# Patient Record
Sex: FEMALE | Race: BLACK OR AFRICAN AMERICAN | Employment: FULL TIME | URBAN - METROPOLITAN AREA
[De-identification: names, ages, dates, MRNs, and addresses within clinical notes are randomized per-mention and may not be internally consistent; named-entity substitution may affect disease eponyms.]

---

## 2017-08-17 PROBLEM — G89.29 CHRONIC LEFT SHOULDER PAIN: Status: ACTIVE | Noted: 2017-08-17

## 2017-08-17 PROBLEM — M25.512 CHRONIC LEFT SHOULDER PAIN: Status: ACTIVE | Noted: 2017-08-17

## 2017-10-19 PROBLEM — M24.661 ARTHROFIBROSIS OF KNEE JOINT, RIGHT: Status: ACTIVE | Noted: 2017-10-19

## 2017-10-19 PROBLEM — Z01.818 PRE-OP EXAM: Status: ACTIVE | Noted: 2017-10-19

## 2017-10-19 PROBLEM — M24.661 FIBROSIS OF RIGHT KNEE JOINT: Status: RESOLVED | Noted: 2017-10-19 | Resolved: 2017-10-19

## 2017-10-19 PROBLEM — M24.661 FIBROSIS OF RIGHT KNEE JOINT: Status: ACTIVE | Noted: 2017-10-19

## 2017-10-19 PROBLEM — Z96.651 STATUS POST TOTAL KNEE REPLACEMENT USING CEMENT, RIGHT: Status: ACTIVE | Noted: 2017-10-19

## 2017-11-01 PROCEDURE — 81001 URINALYSIS AUTO W/SCOPE: CPT | Performed by: NURSE PRACTITIONER

## 2017-11-01 PROCEDURE — 82652 VIT D 1 25-DIHYDROXY: CPT | Performed by: FAMILY MEDICINE

## 2017-12-01 PROBLEM — Z47.1 AFTERCARE FOLLOWING JOINT REPLACEMENT SURGERY: Status: ACTIVE | Noted: 2017-12-01

## 2017-12-11 PROBLEM — Z96.652 STATUS POST TOTAL KNEE REPLACEMENT USING CEMENT, LEFT: Status: ACTIVE | Noted: 2017-12-11

## 2017-12-18 PROBLEM — R26.9 ABNORMALITY OF GAIT: Status: ACTIVE | Noted: 2017-12-18

## 2017-12-18 PROBLEM — Z98.890 S/P SURGICAL MANIPULATION OF KNEE JOINT: Status: ACTIVE | Noted: 2017-12-18

## 2017-12-19 PROBLEM — Z47.1 AFTERCARE FOLLOWING JOINT REPLACEMENT SURGERY: Status: RESOLVED | Noted: 2017-12-01 | Resolved: 2017-12-19

## 2018-04-23 PROBLEM — M24.662 ARTHROFIBROSIS OF KNEE JOINT, LEFT: Status: ACTIVE | Noted: 2018-04-23

## 2018-05-11 PROBLEM — M25.662 DECREASED RANGE OF MOTION OF LEFT KNEE: Status: ACTIVE | Noted: 2018-05-11

## 2018-08-09 PROBLEM — H25.13 AGE-RELATED NUCLEAR CATARACT, BILATERAL: Status: ACTIVE | Noted: 2018-08-09

## 2018-08-09 PROBLEM — H52.13 MYOPIA OF BOTH EYES WITH ASTIGMATISM AND PRESBYOPIA: Status: ACTIVE | Noted: 2018-08-09

## 2018-08-09 PROBLEM — H52.203 MYOPIA OF BOTH EYES WITH ASTIGMATISM AND PRESBYOPIA: Status: ACTIVE | Noted: 2018-08-09

## 2018-08-09 PROBLEM — H52.4 MYOPIA OF BOTH EYES WITH ASTIGMATISM AND PRESBYOPIA: Status: ACTIVE | Noted: 2018-08-09

## 2018-10-02 PROCEDURE — 87389 HIV-1 AG W/HIV-1&-2 AB AG IA: CPT | Performed by: FAMILY MEDICINE

## 2018-10-02 PROCEDURE — 86803 HEPATITIS C AB TEST: CPT | Performed by: FAMILY MEDICINE

## 2018-11-13 PROBLEM — Z80.3 FAMILY HISTORY OF BREAST CANCER: Status: ACTIVE | Noted: 2018-11-13

## 2018-12-11 PROCEDURE — 88305 TISSUE EXAM BY PATHOLOGIST: CPT | Performed by: RADIOLOGY

## 2019-04-24 PROCEDURE — 87205 SMEAR GRAM STAIN: CPT | Performed by: ORTHOPAEDIC SURGERY

## 2019-04-24 PROCEDURE — 87075 CULTR BACTERIA EXCEPT BLOOD: CPT | Performed by: ORTHOPAEDIC SURGERY

## 2019-04-24 PROCEDURE — 87070 CULTURE OTHR SPECIMN AEROBIC: CPT | Performed by: ORTHOPAEDIC SURGERY

## 2019-05-06 VITALS — BODY MASS INDEX: 31.89 KG/M2 | WEIGHT: 180 LBS | HEIGHT: 63 IN

## 2019-05-06 RX ORDER — MULTIVIT-MIN/IRON FUM/FOLIC AC 7.5 MG-4
1 TABLET ORAL DAILY
Status: ON HOLD | COMMUNITY
End: 2019-05-22

## 2019-05-06 RX ORDER — IBUPROFEN 600 MG/1
600 TABLET ORAL EVERY 6 HOURS PRN
COMMUNITY
End: 2019-05-07

## 2019-05-14 ENCOUNTER — APPOINTMENT (OUTPATIENT)
Dept: LAB | Facility: HOSPITAL | Age: 64
End: 2019-05-14
Attending: ORTHOPAEDIC SURGERY
Payer: COMMERCIAL

## 2019-05-14 ENCOUNTER — HOSPITAL ENCOUNTER (OUTPATIENT)
Dept: ULTRASOUND IMAGING | Facility: HOSPITAL | Age: 64
Discharge: HOME | End: 2019-05-14
Attending: ORTHOPAEDIC SURGERY
Payer: COMMERCIAL

## 2019-05-14 DIAGNOSIS — Z96.652 STATUS POST TOTAL KNEE REPLACEMENT USING CEMENT, LEFT: ICD-10-CM

## 2019-05-14 PROCEDURE — 87205 SMEAR GRAM STAIN: CPT | Performed by: ORTHOPAEDIC SURGERY

## 2019-05-14 PROCEDURE — 89060 EXAM SYNOVIAL FLUID CRYSTALS: CPT | Performed by: ORTHOPAEDIC SURGERY

## 2019-05-14 PROCEDURE — 87070 CULTURE OTHR SPECIMN AEROBIC: CPT | Performed by: ORTHOPAEDIC SURGERY

## 2019-05-14 PROCEDURE — 87077 CULTURE AEROBIC IDENTIFY: CPT | Performed by: ORTHOPAEDIC SURGERY

## 2019-05-14 PROCEDURE — 87186 SC STD MICRODIL/AGAR DIL: CPT | Performed by: ORTHOPAEDIC SURGERY

## 2019-05-14 PROCEDURE — 89050 BODY FLUID CELL COUNT: CPT | Performed by: ORTHOPAEDIC SURGERY

## 2019-05-14 PROCEDURE — 89051 BODY FLUID CELL COUNT: CPT | Performed by: ORTHOPAEDIC SURGERY

## 2019-05-14 PROCEDURE — 20611 DRAIN/INJ JOINT/BURSA W/US: CPT | Performed by: ORTHOPAEDIC SURGERY

## 2019-05-14 NOTE — PROCEDURES
PROCEDURE:  US ASPIRATION/INJECTION MAJOR JOINT INCLD IMAG LEFT (CPT=20611)     COMPARISON:  None. INDICATIONS:  J52.758 Presence of left artificial knee joint     DESCRIPTION:  Witnessed verbal and written informed consent was obtained.  Time out was p

## 2019-05-16 PROCEDURE — 87081 CULTURE SCREEN ONLY: CPT | Performed by: FAMILY MEDICINE

## 2019-05-21 ENCOUNTER — ANESTHESIA EVENT (OUTPATIENT)
Dept: SURGERY | Facility: HOSPITAL | Age: 64
DRG: 467 | End: 2019-05-21
Payer: COMMERCIAL

## 2019-05-21 NOTE — ANESTHESIA PREPROCEDURE EVALUATION
PRE-OP EVALUATION    Patient Name: Herson Aceves    Pre-op Diagnosis: Infection of prosthetic knee joint, initial encounter ([facility] [place] [facility] [T84.59XA, Z96.659]    Procedure(s):  REMOVAL OF INFECTED LEFT TOTAL KNEE REPLACEMENT    Surgeon(s) and Role:     Migdalia Ware, Primary osteoarthritis of left knee     Chronic left shoulder pain     Pre-op exam     Status post total knee replacement using cement, right     Arthrofibrosis of knee joint, right     Status post total knee replacement using cement, left     S/P surgic normal.  Breath sounds clear to auscultation bilaterally. Other findings            ASA: 2   Plan: spinal  NPO status verified and patient meets guidelines. Post-procedure pain management plan discussed with surgeon and patient.   Surgeon r

## 2019-05-22 ENCOUNTER — APPOINTMENT (OUTPATIENT)
Dept: GENERAL RADIOLOGY | Facility: HOSPITAL | Age: 64
DRG: 467 | End: 2019-05-22
Attending: PHYSICIAN ASSISTANT
Payer: COMMERCIAL

## 2019-05-22 ENCOUNTER — HOSPITAL ENCOUNTER (INPATIENT)
Facility: HOSPITAL | Age: 64
LOS: 7 days | Discharge: HOME HEALTH | DRG: 467 | End: 2019-05-29
Attending: ORTHOPAEDIC SURGERY | Admitting: ORTHOPAEDIC SURGERY
Payer: COMMERCIAL

## 2019-05-22 ENCOUNTER — ANESTHESIA (OUTPATIENT)
Dept: SURGERY | Facility: HOSPITAL | Age: 64
DRG: 467 | End: 2019-05-22
Payer: COMMERCIAL

## 2019-05-22 DIAGNOSIS — T84.59XA INFECTION OF PROSTHETIC KNEE JOINT, INITIAL ENCOUNTER (HCC): ICD-10-CM

## 2019-05-22 DIAGNOSIS — Z96.659 INFECTION OF PROSTHETIC KNEE JOINT, INITIAL ENCOUNTER (HCC): ICD-10-CM

## 2019-05-22 PROCEDURE — 87075 CULTR BACTERIA EXCEPT BLOOD: CPT | Performed by: ORTHOPAEDIC SURGERY

## 2019-05-22 PROCEDURE — 87070 CULTURE OTHR SPECIMN AEROBIC: CPT | Performed by: ORTHOPAEDIC SURGERY

## 2019-05-22 PROCEDURE — 87076 CULTURE ANAEROBE IDENT EACH: CPT | Performed by: ORTHOPAEDIC SURGERY

## 2019-05-22 PROCEDURE — 87205 SMEAR GRAM STAIN: CPT | Performed by: ORTHOPAEDIC SURGERY

## 2019-05-22 PROCEDURE — 0QBH0ZZ EXCISION OF LEFT TIBIA, OPEN APPROACH: ICD-10-PCS | Performed by: ORTHOPAEDIC SURGERY

## 2019-05-22 PROCEDURE — 87116 MYCOBACTERIA CULTURE: CPT | Performed by: ORTHOPAEDIC SURGERY

## 2019-05-22 PROCEDURE — 87077 CULTURE AEROBIC IDENTIFY: CPT | Performed by: ORTHOPAEDIC SURGERY

## 2019-05-22 PROCEDURE — 87206 SMEAR FLUORESCENT/ACID STAI: CPT | Performed by: ORTHOPAEDIC SURGERY

## 2019-05-22 PROCEDURE — 87176 TISSUE HOMOGENIZATION CULTR: CPT | Performed by: ORTHOPAEDIC SURGERY

## 2019-05-22 PROCEDURE — 87186 SC STD MICRODIL/AGAR DIL: CPT | Performed by: ORTHOPAEDIC SURGERY

## 2019-05-22 PROCEDURE — 0SPD0JZ REMOVAL OF SYNTHETIC SUBSTITUTE FROM LEFT KNEE JOINT, OPEN APPROACH: ICD-10-PCS | Performed by: ORTHOPAEDIC SURGERY

## 2019-05-22 PROCEDURE — 87102 FUNGUS ISOLATION CULTURE: CPT | Performed by: ORTHOPAEDIC SURGERY

## 2019-05-22 PROCEDURE — 87147 CULTURE TYPE IMMUNOLOGIC: CPT | Performed by: ORTHOPAEDIC SURGERY

## 2019-05-22 PROCEDURE — 73560 X-RAY EXAM OF KNEE 1 OR 2: CPT | Performed by: PHYSICIAN ASSISTANT

## 2019-05-22 PROCEDURE — 86901 BLOOD TYPING SEROLOGIC RH(D): CPT | Performed by: ORTHOPAEDIC SURGERY

## 2019-05-22 PROCEDURE — 86850 RBC ANTIBODY SCREEN: CPT | Performed by: ORTHOPAEDIC SURGERY

## 2019-05-22 PROCEDURE — 0SRD0EZ REPLACEMENT OF LEFT KNEE JOINT WITH ARTICULATING SPACER, OPEN APPROACH: ICD-10-PCS | Performed by: ORTHOPAEDIC SURGERY

## 2019-05-22 PROCEDURE — 86920 COMPATIBILITY TEST SPIN: CPT

## 2019-05-22 PROCEDURE — 0QBC0ZZ EXCISION OF LEFT LOWER FEMUR, OPEN APPROACH: ICD-10-PCS | Performed by: ORTHOPAEDIC SURGERY

## 2019-05-22 PROCEDURE — 76942 ECHO GUIDE FOR BIOPSY: CPT | Performed by: ORTHOPAEDIC SURGERY

## 2019-05-22 PROCEDURE — 3E0T3BZ INTRODUCTION OF ANESTHETIC AGENT INTO PERIPHERAL NERVES AND PLEXI, PERCUTANEOUS APPROACH: ICD-10-PCS | Performed by: ANESTHESIOLOGY

## 2019-05-22 PROCEDURE — 86900 BLOOD TYPING SEROLOGIC ABO: CPT | Performed by: ORTHOPAEDIC SURGERY

## 2019-05-22 DEVICE — CEMENT BONE RADIOPAQ HOWMEDICA: Type: IMPLANTABLE DEVICE | Site: KNEE | Status: FUNCTIONAL

## 2019-05-22 RX ORDER — ACETAMINOPHEN 500 MG
1000 TABLET ORAL ONCE
Status: DISCONTINUED | OUTPATIENT
Start: 2019-05-22 | End: 2019-05-22

## 2019-05-22 RX ORDER — DEXAMETHASONE SODIUM PHOSPHATE 4 MG/ML
8 VIAL (ML) INJECTION AS NEEDED
Status: DISCONTINUED | OUTPATIENT
Start: 2019-05-22 | End: 2019-05-22 | Stop reason: HOSPADM

## 2019-05-22 RX ORDER — HYDROMORPHONE HYDROCHLORIDE 1 MG/ML
0.2 INJECTION, SOLUTION INTRAMUSCULAR; INTRAVENOUS; SUBCUTANEOUS EVERY 2 HOUR PRN
Status: DISPENSED | OUTPATIENT
Start: 2019-05-22 | End: 2019-05-24

## 2019-05-22 RX ORDER — TIZANIDINE 2 MG/1
2 TABLET ORAL EVERY 6 HOURS PRN
Qty: 60 TABLET | Refills: 0 | Status: SHIPPED | OUTPATIENT
Start: 2019-05-22 | End: 2019-06-25 | Stop reason: ALTCHOICE

## 2019-05-22 RX ORDER — OXYCODONE HYDROCHLORIDE 5 MG/1
5 TABLET ORAL EVERY 4 HOURS PRN
Status: DISPENSED | OUTPATIENT
Start: 2019-05-22 | End: 2019-05-24

## 2019-05-22 RX ORDER — DIPHENHYDRAMINE HCL 25 MG
25 CAPSULE ORAL EVERY 4 HOURS PRN
Status: DISCONTINUED | OUTPATIENT
Start: 2019-05-22 | End: 2019-05-29

## 2019-05-22 RX ORDER — TRAMADOL HYDROCHLORIDE 50 MG/1
50 TABLET ORAL EVERY 6 HOURS
Status: DISCONTINUED | OUTPATIENT
Start: 2019-05-22 | End: 2019-05-24

## 2019-05-22 RX ORDER — SCOLOPAMINE TRANSDERMAL SYSTEM 1 MG/1
1 PATCH, EXTENDED RELEASE TRANSDERMAL ONCE
Status: COMPLETED | OUTPATIENT
Start: 2019-05-22 | End: 2019-05-25

## 2019-05-22 RX ORDER — BISACODYL 10 MG
10 SUPPOSITORY, RECTAL RECTAL
Status: DISCONTINUED | OUTPATIENT
Start: 2019-05-22 | End: 2019-05-29

## 2019-05-22 RX ORDER — TIZANIDINE 4 MG/1
4 TABLET ORAL 3 TIMES DAILY PRN
Status: DISCONTINUED | OUTPATIENT
Start: 2019-05-22 | End: 2019-05-29

## 2019-05-22 RX ORDER — POLYETHYLENE GLYCOL 3350 17 G/17G
17 POWDER, FOR SOLUTION ORAL DAILY PRN
Status: DISCONTINUED | OUTPATIENT
Start: 2019-05-22 | End: 2019-05-29

## 2019-05-22 RX ORDER — OXYCODONE HYDROCHLORIDE 5 MG/1
5 TABLET ORAL EVERY 4 HOURS PRN
Qty: 40 TABLET | Refills: 0 | Status: SHIPPED | OUTPATIENT
Start: 2019-05-22 | End: 2019-07-22

## 2019-05-22 RX ORDER — SODIUM CHLORIDE 9 MG/ML
INJECTION, SOLUTION INTRAVENOUS CONTINUOUS
Status: DISCONTINUED | OUTPATIENT
Start: 2019-05-22 | End: 2019-05-24

## 2019-05-22 RX ORDER — HYDROCODONE BITARTRATE AND ACETAMINOPHEN 5; 325 MG/1; MG/1
2 TABLET ORAL AS NEEDED
Status: DISCONTINUED | OUTPATIENT
Start: 2019-05-22 | End: 2019-05-22 | Stop reason: HOSPADM

## 2019-05-22 RX ORDER — ONDANSETRON 2 MG/ML
4 INJECTION INTRAMUSCULAR; INTRAVENOUS EVERY 4 HOURS PRN
Status: DISPENSED | OUTPATIENT
Start: 2019-05-22 | End: 2019-05-24

## 2019-05-22 RX ORDER — HYDROMORPHONE HYDROCHLORIDE 1 MG/ML
0.8 INJECTION, SOLUTION INTRAMUSCULAR; INTRAVENOUS; SUBCUTANEOUS EVERY 2 HOUR PRN
Status: ACTIVE | OUTPATIENT
Start: 2019-05-22 | End: 2019-05-24

## 2019-05-22 RX ORDER — SODIUM PHOSPHATE, DIBASIC AND SODIUM PHOSPHATE, MONOBASIC 7; 19 G/133ML; G/133ML
1 ENEMA RECTAL ONCE AS NEEDED
Status: DISCONTINUED | OUTPATIENT
Start: 2019-05-22 | End: 2019-05-29

## 2019-05-22 RX ORDER — OXYCODONE HYDROCHLORIDE 10 MG/1
10 TABLET ORAL EVERY 4 HOURS PRN
Status: DISPENSED | OUTPATIENT
Start: 2019-05-22 | End: 2019-05-24

## 2019-05-22 RX ORDER — NALOXONE HYDROCHLORIDE 0.4 MG/ML
80 INJECTION, SOLUTION INTRAMUSCULAR; INTRAVENOUS; SUBCUTANEOUS AS NEEDED
Status: DISCONTINUED | OUTPATIENT
Start: 2019-05-22 | End: 2019-05-22 | Stop reason: HOSPADM

## 2019-05-22 RX ORDER — SODIUM CHLORIDE, SODIUM LACTATE, POTASSIUM CHLORIDE, CALCIUM CHLORIDE 600; 310; 30; 20 MG/100ML; MG/100ML; MG/100ML; MG/100ML
INJECTION, SOLUTION INTRAVENOUS CONTINUOUS
Status: DISCONTINUED | OUTPATIENT
Start: 2019-05-22 | End: 2019-05-22

## 2019-05-22 RX ORDER — ACETAMINOPHEN 325 MG/1
650 TABLET ORAL ONCE
Status: COMPLETED | OUTPATIENT
Start: 2019-05-22 | End: 2019-05-22

## 2019-05-22 RX ORDER — ONDANSETRON 2 MG/ML
4 INJECTION INTRAMUSCULAR; INTRAVENOUS AS NEEDED
Status: DISCONTINUED | OUTPATIENT
Start: 2019-05-22 | End: 2019-05-22 | Stop reason: HOSPADM

## 2019-05-22 RX ORDER — ACETAMINOPHEN 325 MG/1
650 TABLET ORAL 4 TIMES DAILY
Status: DISPENSED | OUTPATIENT
Start: 2019-05-22 | End: 2019-05-24

## 2019-05-22 RX ORDER — ACETAMINOPHEN 500 MG
1000 TABLET ORAL EVERY 6 HOURS PRN
Qty: 80 TABLET | Refills: 0 | Status: SHIPPED | OUTPATIENT
Start: 2019-05-22 | End: 2019-06-21

## 2019-05-22 RX ORDER — METOCLOPRAMIDE HYDROCHLORIDE 5 MG/ML
10 INJECTION INTRAMUSCULAR; INTRAVENOUS AS NEEDED
Status: DISCONTINUED | OUTPATIENT
Start: 2019-05-22 | End: 2019-05-22 | Stop reason: HOSPADM

## 2019-05-22 RX ORDER — HYDROCODONE BITARTRATE AND ACETAMINOPHEN 5; 325 MG/1; MG/1
1 TABLET ORAL AS NEEDED
Status: DISCONTINUED | OUTPATIENT
Start: 2019-05-22 | End: 2019-05-22 | Stop reason: HOSPADM

## 2019-05-22 RX ORDER — MELATONIN
325
Status: DISCONTINUED | OUTPATIENT
Start: 2019-05-23 | End: 2019-05-29

## 2019-05-22 RX ORDER — KETOROLAC TROMETHAMINE 15 MG/ML
15 INJECTION, SOLUTION INTRAMUSCULAR; INTRAVENOUS EVERY 6 HOURS
Status: COMPLETED | OUTPATIENT
Start: 2019-05-22 | End: 2019-05-23

## 2019-05-22 RX ORDER — OXYCODONE HYDROCHLORIDE 15 MG/1
15 TABLET ORAL EVERY 4 HOURS PRN
Status: ACTIVE | OUTPATIENT
Start: 2019-05-22 | End: 2019-05-24

## 2019-05-22 RX ORDER — DIPHENHYDRAMINE HYDROCHLORIDE 50 MG/ML
12.5 INJECTION INTRAMUSCULAR; INTRAVENOUS EVERY 4 HOURS PRN
Status: DISCONTINUED | OUTPATIENT
Start: 2019-05-22 | End: 2019-05-29

## 2019-05-22 RX ORDER — MEPERIDINE HYDROCHLORIDE 25 MG/ML
12.5 INJECTION INTRAMUSCULAR; INTRAVENOUS; SUBCUTANEOUS AS NEEDED
Status: DISCONTINUED | OUTPATIENT
Start: 2019-05-22 | End: 2019-05-22 | Stop reason: HOSPADM

## 2019-05-22 RX ORDER — METOCLOPRAMIDE HYDROCHLORIDE 5 MG/ML
10 INJECTION INTRAMUSCULAR; INTRAVENOUS EVERY 6 HOURS PRN
Status: DISCONTINUED | OUTPATIENT
Start: 2019-05-22 | End: 2019-05-24

## 2019-05-22 RX ORDER — PROCHLORPERAZINE EDISYLATE 5 MG/ML
10 INJECTION INTRAMUSCULAR; INTRAVENOUS EVERY 6 HOURS PRN
Status: ACTIVE | OUTPATIENT
Start: 2019-05-22 | End: 2019-05-24

## 2019-05-22 RX ORDER — ACETAMINOPHEN 325 MG/1
TABLET ORAL
Status: COMPLETED
Start: 2019-05-22 | End: 2019-05-22

## 2019-05-22 RX ORDER — VANCOMYCIN HYDROCHLORIDE 1 G/20ML
INJECTION, POWDER, LYOPHILIZED, FOR SOLUTION INTRAVENOUS AS NEEDED
Status: DISCONTINUED | OUTPATIENT
Start: 2019-05-22 | End: 2019-05-22 | Stop reason: HOSPADM

## 2019-05-22 RX ORDER — SODIUM CHLORIDE, SODIUM LACTATE, POTASSIUM CHLORIDE, CALCIUM CHLORIDE 600; 310; 30; 20 MG/100ML; MG/100ML; MG/100ML; MG/100ML
INJECTION, SOLUTION INTRAVENOUS CONTINUOUS
Status: DISCONTINUED | OUTPATIENT
Start: 2019-05-22 | End: 2019-05-22 | Stop reason: HOSPADM

## 2019-05-22 RX ORDER — HYDROMORPHONE HYDROCHLORIDE 1 MG/ML
0.4 INJECTION, SOLUTION INTRAMUSCULAR; INTRAVENOUS; SUBCUTANEOUS EVERY 5 MIN PRN
Status: DISCONTINUED | OUTPATIENT
Start: 2019-05-22 | End: 2019-05-22 | Stop reason: HOSPADM

## 2019-05-22 RX ORDER — CEFAZOLIN SODIUM/WATER 2 G/20 ML
2 SYRINGE (ML) INTRAVENOUS ONCE
Status: COMPLETED | OUTPATIENT
Start: 2019-05-22 | End: 2019-05-22

## 2019-05-22 RX ORDER — OXYCODONE HCL 10 MG/1
10 TABLET, FILM COATED, EXTENDED RELEASE ORAL
Status: COMPLETED | OUTPATIENT
Start: 2019-05-22 | End: 2019-05-22

## 2019-05-22 RX ORDER — DIPHENHYDRAMINE HYDROCHLORIDE 50 MG/ML
25 INJECTION INTRAMUSCULAR; INTRAVENOUS ONCE AS NEEDED
Status: ACTIVE | OUTPATIENT
Start: 2019-05-22 | End: 2019-05-22

## 2019-05-22 RX ORDER — SENNOSIDES 8.6 MG
17.2 TABLET ORAL NIGHTLY
Status: DISCONTINUED | OUTPATIENT
Start: 2019-05-22 | End: 2019-05-29

## 2019-05-22 RX ORDER — DOCUSATE SODIUM 100 MG/1
100 CAPSULE, LIQUID FILLED ORAL 2 TIMES DAILY
Qty: 60 CAPSULE | Refills: 0 | Status: SHIPPED | OUTPATIENT
Start: 2019-05-22 | End: 2019-06-25 | Stop reason: ALTCHOICE

## 2019-05-22 RX ORDER — ACETAMINOPHEN 500 MG
1000 TABLET ORAL ONCE
Status: ON HOLD | COMMUNITY
End: 2019-05-22

## 2019-05-22 RX ORDER — MIDAZOLAM HYDROCHLORIDE 1 MG/ML
1 INJECTION INTRAMUSCULAR; INTRAVENOUS EVERY 5 MIN PRN
Status: DISCONTINUED | OUTPATIENT
Start: 2019-05-22 | End: 2019-05-22 | Stop reason: HOSPADM

## 2019-05-22 RX ORDER — HYDROMORPHONE HYDROCHLORIDE 1 MG/ML
0.4 INJECTION, SOLUTION INTRAMUSCULAR; INTRAVENOUS; SUBCUTANEOUS EVERY 2 HOUR PRN
Status: ACTIVE | OUTPATIENT
Start: 2019-05-22 | End: 2019-05-24

## 2019-05-22 RX ORDER — TOBRAMYCIN 1.2 G/30ML
INJECTION, POWDER, LYOPHILIZED, FOR SOLUTION INTRAVENOUS AS NEEDED
Status: DISCONTINUED | OUTPATIENT
Start: 2019-05-22 | End: 2019-05-22 | Stop reason: HOSPADM

## 2019-05-22 RX ORDER — CEFAZOLIN SODIUM/WATER 2 G/20 ML
2 SYRINGE (ML) INTRAVENOUS EVERY 8 HOURS
Status: COMPLETED | OUTPATIENT
Start: 2019-05-22 | End: 2019-05-23

## 2019-05-22 RX ORDER — DOCUSATE SODIUM 100 MG/1
100 CAPSULE, LIQUID FILLED ORAL 2 TIMES DAILY
Status: DISCONTINUED | OUTPATIENT
Start: 2019-05-22 | End: 2019-05-29

## 2019-05-22 NOTE — INTERVAL H&P NOTE
Pre-op Diagnosis: Infection of prosthetic knee joint, initial encounter [facility]) [T84.59XA, Z96.659]    The above referenced H&P was reviewed by Carmen Cortes MD on 5/22/2019, the patient was examined and no significant changes have occurred in the patient's co

## 2019-05-22 NOTE — ANESTHESIA POSTPROCEDURE EVALUATION
300 [facility] Patient Status:  Surgery Admit - Inpt   Age/Gender 66year old female MRN [id number]   [facility] SURGERY Attending Dana Hernandez MD   Hosp Day # 0 PCP Xavi Hunt MD       Anesthesia Post-op Note    Procedu
Matilda Marquez

## 2019-05-22 NOTE — BRIEF OP NOTE
Pre-Operative Diagnosis: Infection of prosthetic knee joint, initial encounter ([facility] [place] [facility] [T84.59XA, Z96.659]     Post-Operative Diagnosis: * No post-op diagnosis entered *      Procedure Performed:   Procedure(s):  REMOVAL OF INFECTED LEFT TOTAL KNEE REPLACEME

## 2019-05-23 ENCOUNTER — APPOINTMENT (OUTPATIENT)
Dept: GENERAL RADIOLOGY | Facility: HOSPITAL | Age: 64
DRG: 467 | End: 2019-05-23
Attending: INTERNAL MEDICINE
Payer: COMMERCIAL

## 2019-05-23 PROCEDURE — 83605 ASSAY OF LACTIC ACID: CPT | Performed by: HOSPITALIST

## 2019-05-23 PROCEDURE — 36573 INSJ PICC RS&I 5 YR+: CPT

## 2019-05-23 PROCEDURE — 71045 X-RAY EXAM CHEST 1 VIEW: CPT | Performed by: INTERNAL MEDICINE

## 2019-05-23 PROCEDURE — 02HV33Z INSERTION OF INFUSION DEVICE INTO SUPERIOR VENA CAVA, PERCUTANEOUS APPROACH: ICD-10-PCS | Performed by: ORTHOPAEDIC SURGERY

## 2019-05-23 PROCEDURE — 97530 THERAPEUTIC ACTIVITIES: CPT

## 2019-05-23 PROCEDURE — B548ZZA ULTRASONOGRAPHY OF SUPERIOR VENA CAVA, GUIDANCE: ICD-10-PCS | Performed by: ORTHOPAEDIC SURGERY

## 2019-05-23 PROCEDURE — 97165 OT EVAL LOW COMPLEX 30 MIN: CPT

## 2019-05-23 PROCEDURE — 82565 ASSAY OF CREATININE: CPT | Performed by: ORTHOPAEDIC SURGERY

## 2019-05-23 PROCEDURE — 97535 SELF CARE MNGMENT TRAINING: CPT

## 2019-05-23 PROCEDURE — 85027 COMPLETE CBC AUTOMATED: CPT | Performed by: ORTHOPAEDIC SURGERY

## 2019-05-23 PROCEDURE — 87040 BLOOD CULTURE FOR BACTERIA: CPT | Performed by: HOSPITALIST

## 2019-05-23 PROCEDURE — 97161 PT EVAL LOW COMPLEX 20 MIN: CPT

## 2019-05-23 PROCEDURE — 3E04329 INTRODUCTION OF OTHER ANTI-INFECTIVE INTO CENTRAL VEIN, PERCUTANEOUS APPROACH: ICD-10-PCS | Performed by: ORTHOPAEDIC SURGERY

## 2019-05-23 PROCEDURE — 85018 HEMOGLOBIN: CPT | Performed by: HOSPITALIST

## 2019-05-23 RX ORDER — SODIUM CHLORIDE 9 MG/ML
INJECTION, SOLUTION INTRAVENOUS ONCE
Status: COMPLETED | OUTPATIENT
Start: 2019-05-23 | End: 2019-05-23

## 2019-05-23 RX ORDER — SODIUM CHLORIDE 0.9 % (FLUSH) 0.9 %
10 SYRINGE (ML) INJECTION AS NEEDED
Status: DISCONTINUED | OUTPATIENT
Start: 2019-05-23 | End: 2019-05-29

## 2019-05-23 RX ORDER — HYDROCODONE BITARTRATE AND ACETAMINOPHEN 10; 325 MG/1; MG/1
2 TABLET ORAL EVERY 4 HOURS PRN
Status: DISCONTINUED | OUTPATIENT
Start: 2019-05-24 | End: 2019-05-29

## 2019-05-23 RX ORDER — SODIUM CHLORIDE 9 MG/ML
INJECTION, SOLUTION INTRAVENOUS CONTINUOUS
Status: DISCONTINUED | OUTPATIENT
Start: 2019-05-23 | End: 2019-05-28

## 2019-05-23 RX ORDER — HYDROCODONE BITARTRATE AND ACETAMINOPHEN 10; 325 MG/1; MG/1
1 TABLET ORAL EVERY 4 HOURS PRN
Status: DISCONTINUED | OUTPATIENT
Start: 2019-05-24 | End: 2019-05-29

## 2019-05-23 RX ORDER — HYDRALAZINE HYDROCHLORIDE 20 MG/ML
5 INJECTION INTRAMUSCULAR; INTRAVENOUS EVERY 6 HOURS PRN
Status: DISCONTINUED | OUTPATIENT
Start: 2019-05-23 | End: 2019-05-29

## 2019-05-23 NOTE — CM/SW NOTE
05/23/19 1000   CM/SW Referral Data   Referral Source Social Work (self-referral)   Reason for Referral Discharge planning   Informant Patient   Patient Info   Patient's Mental Status Alert;Oriented   Patient's [address]   Number of Levels

## 2019-05-23 NOTE — OPERATIVE REPORT
[facility]    PATIENT'S NAME: Miriam Kori   ATTENDING PHYSICIAN: Kolby Verma M.D. OPERATING PHYSICIAN: Kolby Verma M.D.    PATIENT ACCOUNT#:   [de-identified]    LOCATION:  [address] [place] [address]  MEDICAL RECORD #:   [id number]       YOB: 1955 component/cement interface was undermined. It was solidly fixed. Eventually, femoral component was loosened.   After this was taken off, it was noticed that the bone was very soft and that lateral femoral condyle had significant bone loss, but this bone w into the canal.  Tibial spacer was made from a mold of  25 mm thickness. Femoral component was made also from the molding. Into the canal and into the femur, I also put in a femoral antibiotic cement liz. Patella component was taken down using a saw.   O

## 2019-05-23 NOTE — CONSULTS
[facility] Dosing Service    Initial Pharmacokinetic Consult for Vancomycin Dosing     Alfonso Erickson is a 66year old female who is being treated for osteomyelitis. Pharmacy has been asked to dose Vancomycin by Dr. Katelynn Jones. ID consult is also placed.     She i [address], PharmD  5/23/2019  9:36 AM  [facility]: [phone]

## 2019-05-23 NOTE — CM/SW NOTE
[facility] able to accept. Per MD Whitehead's note, pt will need 6 weeks of IV ABX. PICC to be placed today or tomorrow. Will follow up regarding final IV ABX order. Awaiting cultures.

## 2019-05-23 NOTE — PLAN OF CARE
At 1615 B/P dropped to 80/50. Repeat B/P 1/2 hour later 73/44. Dr. Jacob Choudhury notified. 1L IVF bolus given as per order. Stat HGB drawn. Results 7.9.  0.9 NS infusing now at 100/hr. Pt states she feels better.   PICC line placed per vascular access team

## 2019-05-23 NOTE — PLAN OF CARE
Received pt from pacu at 1740. Dressing and immobilizer intact to left knee. Medicated for c/o pain with relief. Able to move all extremities. Ordered dinner. Verbalized understanding of POC. [facility] hospitalist paged, waiting return call.   ID, Dr. Carolyn Kemp

## 2019-05-23 NOTE — CONSULTS
INFECTIOUS DISEASE CONSULT NOTE    [facility] Patient Status:  Inpatient    6/15/1955 MRN [id number]   [facility] 3SW-A Attending Vitaliy Giles MD   [facility] Day # 1 PCP Johnathan Stokes, NAUSEA ONLY  Latex                   RASH    Comment:bandaid cause a rash    Medications:    Current Facility-Administered Medications:   •  Vancomycin HCl (VANCOCIN) 1,750 mg in sodium chloride 0.9% 500 mL IVPB, 1,750 mg, Intravenous, Once  •  Vancomy sulfate EC tab 325 mg, 325 mg, Oral, Daily with breakfast  •  diphenhydrAMINE (BENADRYL) cap/tab 25 mg, 25 mg, Oral, Q4H PRN **OR** diphenhydrAMINE HCl (BENADRYL) injection 12.5 mg, 12.5 mg, Intravenous, Q4H PRN  •  0.9%  NaCl infusion, , Intravenous, Cont Cefazolin  Resistant    Clindamycin <=0.25  Sensitive    Erythromycin >=8  Resistant    Gentamicin <=0.5  Sensitive    Levofloxacin <=0.12  Sensitive    Oxacillin  Resistant    Trimethoprim/Sulfa <=10  Sensitive    Vancomycin 1  Sensitive           Radi aspirated. LAB IF SUBMITTED:  Specimen submitted to the lab for analysis. COMPLICATIONS:  None. CONCLUSION:  Successful sonographically guided left knee aspiration.      Dictated by: Lydia Sanchez MD on 5/14/2019 at 9:19     Approved by: Micheal Perez

## 2019-05-23 NOTE — PHYSICAL THERAPY NOTE
PHYSICAL THERAPY EVALUATION - INPATIENT     Room Number: 362/362-A  Evaluation Date: 5/23/2019  Type of Evaluation: Initial  Physician Order: PT Eval and Treat    Presenting Problem: S/p Removal of Infected Left TKA, I & D Left knee,Insertion of ceme No family present. Patient self-stated goal is to be able to heal better & be independent.     OBJECTIVE  Precautions: Knee immobilizer  Fall Risk: High fall risk    WEIGHT BEARING RESTRICTION  Weight Bearing Restriction: L lower extremity           L Lo AM-PAC Score:  Raw Score: 20   Approx Degree of Impairment: 35.83%   Standardized Score (AM-PAC Scale): 47.67   CMS Modifier (G-Code): CJ    FUNCTIONAL ABILITY STATUS  Gait Assessment   Gait Assistance: Maximum assistance(Actual assist - Supervision) evaluation complexity is considered low. These impairments and comorbidities manifest themselves as functional limitations in independent bed mobility, transfers, and gait skills & safety.   The patient is below baseline and would benefit from skilled inpa

## 2019-05-23 NOTE — CONSULTS
General Medicine Consult      Reason for consult: post-op medical management     Consulted by: Dr. Vijaya Presley    PCP: Johnathan Stokes MD      History of Present Illness: Patient is a 66year old female with PMH sig for OA s/p L TKA 2017 and manipulation in 2018 tablet (2 mg total) by mouth every 6 (six) hours as needed. Disp: 60 tablet Rfl: 0   Vitamin D3 2000 units Oral Cap Take 2,000 Units by mouth 2 (two) times daily.    Disp:  Rfl: 0       Scheduled Medication:  • vancomycin  1,750 mg Intravenous Once   • vanc Encounters:  05/22/19 : 185 lb 3 oz (84 kg)  05/06/19 : 180 lb (81.6 kg)  05/07/19 : 186 lb (84.4 kg)  11/19/18 : 183 lb (83 kg)  11/13/18 : 187 lb (84.8 kg)  10/19/18 : 191 lb 9.6 oz (86.9 kg)      General: alert and oriented, NAD  HEENT: normocephalic, M prolonged course of IV abx  - eliquis for DVT prophy    # Post-op pain   - controlled with IV toradol, oxy IR, and ultram PRN  - Bowel regimen ordered    # chronic microcytic anemia  - preop Hgb 10.7 --> 9 today (within expected postop range)  - daily CBC

## 2019-05-23 NOTE — PROGRESS NOTES
Orthopedic surgery progress note    Shan Roach Patient Status:  Inpatient    6/15/1955 MRN [id number]   [facility] 3SW-A Attending Elian Cruz MD   Hosp Day # 1 PCP Sarai Turpin MD       Subjective:  No major complaints.   No calf sherley

## 2019-05-23 NOTE — PLAN OF CARE
Pt started having chills and shaking at approx 1215. B/P elevated at that time. Pt was half way completed with first dose Vanco.  No c/o itching, hives, SOB. Per Dr. Babatunde Santo, [facility] to complete Vanco, didn't think it was allergic reaction.   After 1/2hr, B/P r

## 2019-05-23 NOTE — PLAN OF CARE
Patient taking oxy ir prn and scheduled toradol and tramadol for pain with good relief noted per patient. Patient voiding without difficulty. Knee immobilizer to left knee on at all times. Ice therapy in place. Plan of care reviewed.  Safety precautions katerine

## 2019-05-23 NOTE — OCCUPATIONAL THERAPY NOTE
OCCUPATIONAL THERAPY EVALUATION - INPATIENT     Room Number: 362/362-A  Evaluation Date: 5/23/2019  Type of Evaluation:Initial  Presenting Problem: (L TKA removal,  I&D and cement spacer placement)    Physician Order: IP Consult to Occupational Therapy  Re Lower Extremity: Toe Touch Weight Bearing    PAIN ASSESSMENT  Ratin  Location: (L knee)  Management Techniques: Relaxation;Repositioning    COGNITION  WFL    VISION  Wears glasses all the time    PERCEPTION  WFL    SENSATION  WFL in UEs    Communicatio surfaces prior to reaching back and sitting down. OT provided instruction in use of gait belt as leg  for sit to supine, supine to sit, completed with mod I  OT assisted with placement of gel ice SCDs and KI with LEs elevated in recliner.  Nurse earl activities; Energy conservation/work simplification techniques;ADL training;Functional transfer training; Endurance training;Patient/Family education;Patient/Family training;Equipment eval/education; Compensatory technique education  Rehab Potential : Good  F

## 2019-05-23 NOTE — PLAN OF CARE
Pt ambulates well maintaining TDWB to LLE. Vanco IV started as per order. VSS, afebrile. Immobilizer remains in place. Verbalized understanding of POC. Will continue to monitor.

## 2019-05-23 NOTE — PROGRESS NOTES
Post Op Day 1 Ortho Note    Status Post Nerve Block:  Type of Nerve Block: Left adductor canal hardware removal TKR  Single Injection Nerve Block    Post op review: No evidence of immediate block related complications, No paresthesia noted, Able to lift le

## 2019-05-24 PROCEDURE — 97116 GAIT TRAINING THERAPY: CPT

## 2019-05-24 PROCEDURE — 85018 HEMOGLOBIN: CPT | Performed by: HOSPITALIST

## 2019-05-24 PROCEDURE — 97535 SELF CARE MNGMENT TRAINING: CPT

## 2019-05-24 PROCEDURE — 80202 ASSAY OF VANCOMYCIN: CPT | Performed by: ORTHOPAEDIC SURGERY

## 2019-05-24 PROCEDURE — 85027 COMPLETE CBC AUTOMATED: CPT | Performed by: ORTHOPAEDIC SURGERY

## 2019-05-24 PROCEDURE — 87086 URINE CULTURE/COLONY COUNT: CPT | Performed by: HOSPITALIST

## 2019-05-24 PROCEDURE — 81001 URINALYSIS AUTO W/SCOPE: CPT | Performed by: HOSPITALIST

## 2019-05-24 PROCEDURE — 97110 THERAPEUTIC EXERCISES: CPT

## 2019-05-24 PROCEDURE — 84300 ASSAY OF URINE SODIUM: CPT | Performed by: HOSPITALIST

## 2019-05-24 PROCEDURE — 82565 ASSAY OF CREATININE: CPT | Performed by: ORTHOPAEDIC SURGERY

## 2019-05-24 PROCEDURE — 80048 BASIC METABOLIC PNL TOTAL CA: CPT | Performed by: HOSPITALIST

## 2019-05-24 PROCEDURE — 82570 ASSAY OF URINE CREATININE: CPT | Performed by: HOSPITALIST

## 2019-05-24 RX ORDER — TRAMADOL HYDROCHLORIDE 50 MG/1
50 TABLET ORAL EVERY 12 HOURS PRN
Status: DISCONTINUED | OUTPATIENT
Start: 2019-05-24 | End: 2019-05-28

## 2019-05-24 RX ORDER — METOCLOPRAMIDE HYDROCHLORIDE 5 MG/ML
5 INJECTION INTRAMUSCULAR; INTRAVENOUS EVERY 6 HOURS PRN
Status: ACTIVE | OUTPATIENT
Start: 2019-05-24 | End: 2019-05-24

## 2019-05-24 NOTE — PROGRESS NOTES
Orthopedic surgery progress note    Jeronimo Wilks Patient Status:  Inpatient    6/15/1955 MRN [id number]   [facility] 3SW-A Attending Aram Junior MD   Hosp Day # 2 PCP Sapna York MD       Subjective:  No major complaints.   No calf sherley

## 2019-05-24 NOTE — CONSULTS
INFECTIOUS DISEASE progresS note    Jena Luong Patient Status:  Inpatient    6/15/1955 MRN [id number]   [facility] 3SW-A Attending Vanita Shea MD   [facility] Day # 2 PCP Tino Ricardo, that she has never smoked. She has never used smokeless tobacco. She reports that she drank alcohol. She reports that she does not use drugs.     Allergies:    Nickel                  HIVES  Lactose Intolerance     NAUSEA ONLY  Latex                   RASH mg, 10 mg, Intravenous, Q6H PRN  •  ferrous sulfate EC tab 325 mg, 325 mg, Oral, Daily with breakfast  •  diphenhydrAMINE (BENADRYL) cap/tab 25 mg, 25 mg, Oral, Q4H PRN **OR** diphenhydrAMINE HCl (BENADRYL) injection 12.5 mg, 12.5 mg, Intravenous, Q4H PRN [facility]      4+ WBCs seen [employer]      This is a cytocentrifuged smear.  [facility]      Gram stain of positive bottle shows: [facility]      Gram positive cocci in clusters [employer]            Susceptibility      Staphylococcus epidermidis performed in the usual sterile manner. LESION(S) LOCATION:  A left suprapatellar fluid collection was targeted utilizing live ultrasound guidance. NEEDLE:  18 gauge spinal needle was used for aspiration.  SPECIMEN APPEARANCE:  15 mL of mildly thick yellow

## 2019-05-24 NOTE — CM/SW NOTE
MSW spoke with [facility] from [facility]. They are still working on insurance for this patient. Shahbaz Duque from [facility] will call MSW when they know if they can accept this patient.     Vaishali Camejo LCSW

## 2019-05-24 NOTE — CM/SW NOTE
MSW received a call back from [facility]. The patient does not have any home infusion benefits. She will need to be referred to [facility] or OP infusion depending on recommendations. MSW will continue to follow.     Lieutenant Sophia LCSW

## 2019-05-24 NOTE — OCCUPATIONAL THERAPY NOTE
OCCUPATIONAL THERAPY TREATMENT NOTE - INPATIENT     Room Number: 362/362-A  Session: 1/2  Number of Visits to Meet Established Goals: 2    Presenting Problem: (L TKA removal,  I&D and cement spacer placement)    History related to current admission:   Admi drying)?: A Little  -   Toileting, which includes using toilet, bedpan or urinal? : A Little  -   Putting on and taking off regular upper body clothing?: None  -   Taking care of personal grooming such as brushing teeth?: None  -   Eating meals?: None    A with this level of impairment often benefit from home. Recommend home with assistance during recovery. OT Discharge Recommendations: Home(family assist dur recovery)  OT Device Recommendations: TBD    PLAN  OT Treatment Plan: Balance activities; En

## 2019-05-24 NOTE — PLAN OF CARE
Told by PT that pt requesting pain med. I went to see pt with 1 norco tab pt rating pain 10 so I gave pt a total of 2 norco, then moaning so I gave her 0.2 of dilaudid. Pt appears much more comfortable, rating pain 7 now.  Pt has tubigrip on and coverlet, c

## 2019-05-24 NOTE — PLAN OF CARE
Pt. Admitted for L knee HWR and I&D with Dr. Rosalia Vu on 05-22-19, POD 2. Pain level is well controlled. Ambulates with walker and min assist, TTWB to LLE, knee immobilizer at all times. Incision on left knee is cdi with acewrap, using gel ice.  Denies numbnes

## 2019-05-24 NOTE — CM/SW NOTE
MSW heard back from [facility]. The patient truly does not have home infusion benefits and will either need to be Q24 drug and go OP or go to [facility]. MSW will continue to monitor.     Santos Block LCSW

## 2019-05-24 NOTE — CM/SW NOTE
MSW met with the patient to discuss potential dc plans. The patient had PICC placed. We currently do not know drug or frequency of medication and therefore it will be difficult to initiate a dc plan.   MSW sent an additional referral to [facility] to con

## 2019-05-24 NOTE — PHYSICAL THERAPY NOTE
PHYSICAL THERAPY TREATMENT NOTE - INPATIENT    Room Number: 362/362-A     Session: 1   Number of Visits to Meet Established Goals: 2    Presenting Problem: S/p Removal of Infected Left TKA, I & D Left knee,Insertion of cement spacer on 05/22/19     Histor '6-Clicks' INPATIENT SHORT FORM - BASIC MOBILITY  How much difficulty does the patient currently have. ..  -   Turning over in bed (including adjusting bedclothes, sheets and blankets)?: None   -   Sitting down on and standing up from a chair with arms (e.g RW for short distance gait, mod ind for transfers and bed mobility. Pt was educated on importance of short dist amb at home and while in the hospital to prevent deconditioning. Pt is instructed to amb with nursing staff three times a day.  Continue to recom

## 2019-05-24 NOTE — PROGRESS NOTES
[facility] Pharmacy Note:  Renal Dose Adjustment for Tramadol Gilma Oas)    Farhat Knee has been prescribed Tramadol (ULTRAM) 50 mg orally every 6 hours as needed for pain. Estimated Creatinine Clearance: 29.6 mL/min (A) (based on SCr of 1.65 mg/dL (H)).     Her

## 2019-05-24 NOTE — PROGRESS NOTES
Acute Pain Service    Post Op Day 2 Ortho Note    Assessed patient in bed. Patient rates pain 4/10 at rest and 9/10 with activity.  Patient taking Norco and IV Dilaudid to manage pain; SBP 80s this am - on IVFs and Vancomycin - management per Hospitalist.

## 2019-05-24 NOTE — PLAN OF CARE
500 ml bolus started as per order. Will draw lactic acid now and repeat HGB in am per Dr. Traci Cornejo. Will continue to monitor.

## 2019-05-24 NOTE — PROGRESS NOTES
Pharmacy renal dose adjustment for metoclopramide (Reglan)    Kristopher Arceo has been prescribed metoclopramide 10 mg every 8 hours for nausea/vomiting. Estimated Creatinine Clearance: 29.6 mL/min (A) (based on SCr of 1.65 mg/dL (H)).     The estimated cre

## 2019-05-25 PROCEDURE — 85025 COMPLETE CBC W/AUTO DIFF WBC: CPT | Performed by: HOSPITALIST

## 2019-05-25 PROCEDURE — 36430 TRANSFUSION BLD/BLD COMPNT: CPT

## 2019-05-25 PROCEDURE — 97535 SELF CARE MNGMENT TRAINING: CPT

## 2019-05-25 PROCEDURE — 85018 HEMOGLOBIN: CPT | Performed by: HOSPITALIST

## 2019-05-25 PROCEDURE — 30233N1 TRANSFUSION OF NONAUTOLOGOUS RED BLOOD CELLS INTO PERIPHERAL VEIN, PERCUTANEOUS APPROACH: ICD-10-PCS | Performed by: ORTHOPAEDIC SURGERY

## 2019-05-25 PROCEDURE — 82565 ASSAY OF CREATININE: CPT | Performed by: ORTHOPAEDIC SURGERY

## 2019-05-25 PROCEDURE — 85027 COMPLETE CBC AUTOMATED: CPT | Performed by: ORTHOPAEDIC SURGERY

## 2019-05-25 PROCEDURE — 80048 BASIC METABOLIC PNL TOTAL CA: CPT | Performed by: HOSPITALIST

## 2019-05-25 RX ORDER — SODIUM CHLORIDE 9 MG/ML
INJECTION, SOLUTION INTRAVENOUS ONCE
Status: COMPLETED | OUTPATIENT
Start: 2019-05-25 | End: 2019-05-25

## 2019-05-25 NOTE — PROGRESS NOTES
[facility] dosing service    Follow-up Pharmacokinetic Consult for Vancomycin Dosing     Edward Nguyen is a 66year old female who is being treated for osteomyelitis. Patient is on day 2 of Vancomycin and is currently receiving 1 gm IV Q 12 hours.   Goal Status: None (Preliminary result)    Collection Time: 05/22/19  1:49 PM   Result Value Ref Range    Body Fluid Culture Result No Growth 2 Days N/A    Body Fld Smear 2+ WBCs seen N/A    Body Fld Smear No organisms seen N/A       Based on the above:    1.  D

## 2019-05-25 NOTE — PROGRESS NOTES
[facility] Patient Status:  Inpatient    6/15/1955 MRN [id number]   [facility] 3SW-A Attending Sharyon Meckel, MD   Hosp Day # 3 PCP Migdalia Larios MD         S:  Patient doing well. No nausea. No SOB, CP or calf pain.

## 2019-05-25 NOTE — PROGRESS NOTES
[facility] Hospitalist Progress Note     Josselyneddie Weeks Patient Status:  Inpatient    6/15/1955 MRN [id number]   [facility] 3SW-A Attending Bridget Gunn MD   Hosp Day # 3 PCP Dori Harding MD     CC: follow up    SUBJECTIVE:  Pt sitting up in 8843     PRN Medication:traMADol HCl, HYDROcodone-acetaminophen **OR** HYDROcodone-acetaminophen, hydrALAzine HCl, Normal Saline Flush, tiZANidine HCl, PEG 3350, magnesium hydroxide, bisacodyl, FLEET ENEMA, diphenhydrAMINE **OR** diphenhydrAMINE HCl

## 2019-05-25 NOTE — OCCUPATIONAL THERAPY NOTE
OCCUPATIONAL THERAPY TREATMENT NOTE - INPATIENT     Room Number: 362/362-A  Session: 2/2  Number of Visits to Meet Established Goals: 2    Presenting Problem: (L TKA removal,  I&D and cement spacer placement)    History related to current admission:   Admi which includes using toilet, bedpan or urinal? : A Little(supervision)  -   Putting on and taking off regular upper body clothing?: None  -   Taking care of personal grooming such as brushing teeth?: None  -   Eating meals?: None    AM-PAC Score:  Score: 2 have supervision at home from  and/or mother, depending on whether she stays at home or with her mother, reports also has many sisters available to assist. Patient also with good recall and return demo incorporating TDWB precautions into ADL tasks.

## 2019-05-25 NOTE — PROGRESS NOTES
SPOKE TO DR. KATHERINE RODRIGUEZ [facility] AND NOTIFIED HER OF THE STAT HGB RESULT OF 7.4 FROM 7.2 THIS AM AND ABNORMAL URINALYSIS RESULT. ORDER GIVEN TO START PT. ON CEFTRIAXONE DAILY.

## 2019-05-25 NOTE — PROGRESS NOTES
SPOKE TO DR. KATHERINE BOX REGARDING HGB 6.7 THIS AM. PT. IS ASYMPTOMATIC. ORDER GIVEN TO GIVE I UNIT PRBC. PT. UPDATED ON PLAN OF CARE AND CONSENT FOR TRANSFUSION OBTAINED. ABOVE ORDER ENDORSED TO AM RN. NILESH KISER ALSO NOTIFIED OF PLAN TO TRANSFUSE BLOOD

## 2019-05-25 NOTE — PROGRESS NOTES
PRBC for Hgb 6.7.    12:00: tolerated transfusion without any s/s of reaction.     1330: repeat Hgb 8.3

## 2019-05-25 NOTE — PLAN OF CARE
PT. C/O OF MODERATE POSTOP PAIN MANAGED WITH PRN NORCO 10. PT. TOLERATED AMBULATION TO THE BATHROOM WITH WALKER AND 1 ASSIST. VSS. SURGICAL DRESSING CDI. PICC PATENT. ABX GIVEN AS ORDERED. SAFETY MEASURES IN PLACE.

## 2019-05-25 NOTE — PLAN OF CARE
Pt Aox4. R.A. Denies numbness/tingling. VSS. BM on 5/21, declining all stool softners/laxatives. Pt educated on high risk for constipation, still declined. C/o mild pain relieved by PO pain meds. TTWB with walker, tolerates well.   Immobilizer to LLE o

## 2019-05-26 PROCEDURE — 83735 ASSAY OF MAGNESIUM: CPT | Performed by: HOSPITALIST

## 2019-05-26 PROCEDURE — 82565 ASSAY OF CREATININE: CPT | Performed by: ORTHOPAEDIC SURGERY

## 2019-05-26 PROCEDURE — 85018 HEMOGLOBIN: CPT | Performed by: HOSPITALIST

## 2019-05-26 PROCEDURE — 85025 COMPLETE CBC W/AUTO DIFF WBC: CPT | Performed by: HOSPITALIST

## 2019-05-26 PROCEDURE — 80048 BASIC METABOLIC PNL TOTAL CA: CPT | Performed by: HOSPITALIST

## 2019-05-26 RX ORDER — ONDANSETRON 4 MG/1
4 TABLET, ORALLY DISINTEGRATING ORAL EVERY 6 HOURS PRN
Status: DISCONTINUED | OUTPATIENT
Start: 2019-05-26 | End: 2019-05-29

## 2019-05-26 RX ORDER — ONDANSETRON 2 MG/ML
4 INJECTION INTRAMUSCULAR; INTRAVENOUS EVERY 6 HOURS PRN
Status: DISCONTINUED | OUTPATIENT
Start: 2019-05-26 | End: 2019-05-29

## 2019-05-26 NOTE — PLAN OF CARE
Pt A&Ox4. On ra,  & scds to RLE in place. Immobilizer in place to LLE, dry dressing to knee changed this am by ortho pa. Pain controlled on pain meds. Ivf infusing per orders. Iv abx given. Picc line in place. Pt up with min assist walker & immobilizer.

## 2019-05-26 NOTE — PROGRESS NOTES
[facility] Hospitalist Progress Note     Cole Quick Patient Status:  Inpatient    6/15/1955 MRN [id number]   [facility] 3SW-A Attending Skyler Sanchez MD   Hosp Day # 4 PCP Letty Cates MD     CC: follow up    SUBJECTIVE:  Pt sitting up Medication:  • sodium chloride 75 mL/hr at 05/26/19 0811     PRN Medication:traMADol HCl, HYDROcodone-acetaminophen **OR** HYDROcodone-acetaminophen, hydrALAzine HCl, Normal Saline Flush, tiZANidine HCl, PEG 3350, magnesium hydroxide, bisacodyl, FLEET ENEM

## 2019-05-26 NOTE — PLAN OF CARE
Takes  Norco for pain with adequate relief. VSS, afebrile. Coverlet dsg to lt knee with scant amt of old sanguinous drainage. Pt denies calf pain, no n/t to lt leg. KI on at all times. Safety prec in place, pt aware to call when assistance needed.  Plan is

## 2019-05-26 NOTE — PROGRESS NOTES
300 [facility] Patient Status:  Inpatient    6/15/1955 MRN [id number]   [facility] 3SW-A Attending Deb Villela MD   Hosp Day # 4 PCP Quinn Sanders MD         S:  Patient doing well. No nausea. No SOB, CP or calf pain.

## 2019-05-27 PROCEDURE — 80048 BASIC METABOLIC PNL TOTAL CA: CPT | Performed by: HOSPITALIST

## 2019-05-27 PROCEDURE — 83735 ASSAY OF MAGNESIUM: CPT | Performed by: HOSPITALIST

## 2019-05-27 PROCEDURE — 80202 ASSAY OF VANCOMYCIN: CPT | Performed by: INTERNAL MEDICINE

## 2019-05-27 PROCEDURE — 85025 COMPLETE CBC W/AUTO DIFF WBC: CPT | Performed by: HOSPITALIST

## 2019-05-27 PROCEDURE — 84132 ASSAY OF SERUM POTASSIUM: CPT | Performed by: HOSPITALIST

## 2019-05-27 RX ORDER — MELATONIN
325
Qty: 30 TABLET | Refills: 0 | Status: SHIPPED | OUTPATIENT
Start: 2019-05-28 | End: 2019-06-25 | Stop reason: ALTCHOICE

## 2019-05-27 RX ORDER — POTASSIUM CHLORIDE 20 MEQ/1
40 TABLET, EXTENDED RELEASE ORAL EVERY 4 HOURS
Status: COMPLETED | OUTPATIENT
Start: 2019-05-27 | End: 2019-05-27

## 2019-05-27 NOTE — PROGRESS NOTES
[facility] dosing service    Follow-up Pharmacokinetic Consult for Vancomycin Dosing     Ita Casillas is a 66year old female who is being treated for osteomyelitis. Patient is on day 5 of Vancomycin and add is currently receiving 1 gm IV Q 24 hours.   G Result 1+ growth Staphylococcus species, not aureus (A) N/A    Aerobic Smear 1+ WBCs seen N/A    Aerobic Smear No organisms seen N/A   6.  BODY FLUID CULT,AEROBIC AND ANAEROBIC     Status: None    Collection Time: 05/22/19  1:49 PM   Result Value Ref Range

## 2019-05-27 NOTE — CM/SW NOTE
Noted pt's medical group of [facility] [phone] on her insurance card- referrals may have to go through office, will check in AM.  So far, no infusion benefits at home have been confirmed.     SW will f/u in AM.

## 2019-05-27 NOTE — PROGRESS NOTES
[facility]                INFECTIOUS DISEASE PROGRESS NOTE    Katelyn Dailey Patient Status:  Inpatient    6/15/1955 MRN [id number]   [facility] 3SW-A Attending Brian Piña MD   Hosp Day # 5 PCP Alix Contreras MD     Antibiotics: ANAEROBIC CULTURE     Status: None    Collection Time: 05/22/19  2:50 PM   Result Value Ref Range    Anaerobic Culture No Anaerobes isolated N/A   5. AEROBIC BACTERIAL CULTURE     Status: Abnormal    Collection Time: 05/22/19  2:50 PM   Result Value Ref Ra

## 2019-05-27 NOTE — PROGRESS NOTES
[facility] Hospitalist Progress Note     Veto Albdanie Patient Status:  Inpatient    6/15/1955 MRN [id number]   [facility] 3SW-A Attending Deb Villela MD   Hosp Day # 5 PCP Quinn Sanders MD     CC: follow up    SUBJECTIVE:  Pt sitting u docusate sodium  100 mg Oral BID   • ferrous sulfate  325 mg Oral Daily with breakfast   • apixaban  2.5 mg Oral BID     Continuous Infusing Medication:  • sodium chloride 75 mL/hr at 05/27/19 0138     PRN Medication:ondansetron **OR** ondansetron HCl, tra

## 2019-05-27 NOTE — PLAN OF CARE
SW to follow up w/ referrals in am, ac=836/74 prior to ambulating in halls, Hydralazine to be given x 1, Vanco trough due today at 1300, pain meds offered prn, up in chair, encourage ambulation in halls, IV abx given as scheduled, requested to go home w/ H

## 2019-05-27 NOTE — PROGRESS NOTES
300 [facility] Patient Status:  Inpatient    6/15/1955 MRN [id number]   [facility] 3SW-A Attending Walter Galarza MD   Hosp Day # 5 PCP Richa Sidhu MD         S:  Patient doing well. No nausea. No SOB, CP or calf pain.

## 2019-05-27 NOTE — PLAN OF CARE
POD#5 of LEFT knee hardware removal and I&D. Pt is A/Ox4, pleasant & cooperative. Denies any numbness/tingling, no headaches. Pt is on room air, no SOB/distress noted. Denies any chest pains. SCD on to RLE & pt on Eliquis for VTE prophylaxis.  Continent of that affect risk of falls.   - [place] fall precautions as indicated by assessment.  - Educate pt/family on patient safety including physical limitations  - Instruct pt to call for assistance with activity based on assessment  - Modify environment to redu

## 2019-05-28 PROCEDURE — 85025 COMPLETE CBC W/AUTO DIFF WBC: CPT | Performed by: HOSPITALIST

## 2019-05-28 PROCEDURE — 83735 ASSAY OF MAGNESIUM: CPT | Performed by: HOSPITALIST

## 2019-05-28 PROCEDURE — 80048 BASIC METABOLIC PNL TOTAL CA: CPT | Performed by: HOSPITALIST

## 2019-05-28 RX ORDER — TRAMADOL HYDROCHLORIDE 50 MG/1
50 TABLET ORAL EVERY 6 HOURS PRN
Status: DISCONTINUED | OUTPATIENT
Start: 2019-05-28 | End: 2019-05-29

## 2019-05-28 NOTE — CONSULTS
[facility] Pharmacy Note:  Renal Dose Adjustment for Tramadol Shawna Olivera     Almond Goldmann is currently on Tramadol (ULTRAM) 50 mg orally every 12 hours as needed for pain. Estimated Creatinine Clearance: 53.1 mL/min (based on SCr of 0.92 mg/dL).        Her calcul

## 2019-05-28 NOTE — PLAN OF CARE
Patient alert and orientated. Left knee with aquacel intact. Knee immobilizer on at all times. Up with min assist and walker. TTWB to left leg. Taking norco for pain with good relief noted. Discharge planning for IV antibiotics at home.  Plan of care review

## 2019-05-28 NOTE — PROGRESS NOTES
[facility] Hospitalist Progress Note     Heena La Patient Status:  Inpatient    6/15/1955 MRN [id number]   [facility] 3SW-A Attending Josselyn Veras MD   Hosp Day # 6 PCP Wilber Morgan MD     CC: follow up    SUBJECTIVE:  Pt sitting u mg Oral BID   • ferrous sulfate  325 mg Oral Daily with breakfast   • apixaban  2.5 mg Oral BID     Continuous Infusing Medication:  • sodium chloride 75 mL/hr at 05/27/19 1520     PRN Medication:ondansetron **OR** ondansetron HCl, traMADol HCl, HYDROcodon

## 2019-05-28 NOTE — CM/SW NOTE
Call from [employer] [phone] that pt does have coverage for infusion at home if pt is homebound. She notes that she was persistent with her questions about coverage and also followed up with medical group.   She was informed that [facility] is in

## 2019-05-28 NOTE — PROGRESS NOTES
[address] [facility]  TEL: [phone]  FAX: [phone]    [facility]:  [facility]    6/15/1955 JHONY [id number]   [facility] 3SW-A Attending Tena Dias MD   Hosp Day # 6 PCP Ayesha Winters MD of distal femur noted at the time of surgery, s/p prosthesis removal and spacer placement on 5/22  - previous cx with Staph epi, OR cx with S epi, S capitis and P acnes so far  - cont to cover with vanco, will cover CNS and P acnes  - will need 6 weeks of

## 2019-05-28 NOTE — PLAN OF CARE
ALERT ,AWAKE, ORIENTED X4. C/O NAUSEA ,NO VOMITING . CALL LIGHT W/IN REACH. SEE MAR. INSTRUCTED TO CALL FOR ALL NEEDS AND ASSISTANCE. DENIES CP OR CALF PAIN

## 2019-05-29 VITALS
HEART RATE: 59 BPM | WEIGHT: 185.19 LBS | HEIGHT: 63 IN | SYSTOLIC BLOOD PRESSURE: 155 MMHG | OXYGEN SATURATION: 97 % | BODY MASS INDEX: 32.81 KG/M2 | DIASTOLIC BLOOD PRESSURE: 73 MMHG | RESPIRATION RATE: 16 BRPM | TEMPERATURE: 98 F

## 2019-05-29 PROCEDURE — 82565 ASSAY OF CREATININE: CPT | Performed by: INTERNAL MEDICINE

## 2019-05-29 PROCEDURE — 80202 ASSAY OF VANCOMYCIN: CPT | Performed by: ORTHOPAEDIC SURGERY

## 2019-05-29 RX ORDER — ONDANSETRON HYDROCHLORIDE 8 MG/1
8 TABLET, FILM COATED ORAL EVERY 8 HOURS PRN
Qty: 30 TABLET | Refills: 0 | Status: SHIPPED | OUTPATIENT
Start: 2019-05-29 | End: 2019-07-22

## 2019-05-29 NOTE — PROGRESS NOTES
Orthopedic surgery progress note    Ita Casillas Patient Status:  Inpatient    6/15/1955 MRN [id number]   [facility] 3SW-A Attending Marci Walls MD   Hosp Day # 7 PCP Jarred Becker MD       Subjective:  No major complaints.   No calf sherley

## 2019-05-29 NOTE — CM/SW NOTE
IVAB orders sent to [facility] [phone], provider will obtain prior auth from pt's insurance. Spoke with pt re: [facility]. Agency name that  sent her is for non-skilled services.   SW contacted pt's PCP office (Dr. Apryl Salazar [phone]

## 2019-05-29 NOTE — PROGRESS NOTES
[address] [facility]  TEL: [phone]  FAX: [phone]    Asiajosue Brandy Patient Status:  Inpatient    6/15/1955 MRN [id number]   [facility] 3SW-A Attending Rosendo Bryant MD   Hosp Day # 7 PCP Naga Wills MD OM of distal femur noted at the time of surgery, s/p prosthesis removal and spacer placement on 5/22  - polymicrobial infection. previous cx with Staph epi, OR cx with S epi, S capitis and P acnes. Krissy noted, S epi on cx from 5/14 was R to oxacillin.  Staph

## 2019-05-29 NOTE — PLAN OF CARE
Pt denies pain at this time. Medicated with [place] at 0600. Coverlet dressing changed by [place] [id number] Sharron Toro RN, CDI. Immobilizer and tubigrip in place. VSS, SCD to R leg. Cleared for d/c by all services, SW following for d/c planning. Will d/c with PICC line and abx.

## 2019-05-29 NOTE — CM/SW NOTE
Call from Edith with [facility] [employer]- approval obtained. They will coordinate delivery of IVAB/supplies with pt for this evening to her home. Informed her that [facility] will provide HHC. MARCE spoke with Winsome Hendrickson at [facility] re: above.   Noted pt's

## 2019-05-29 NOTE — PROGRESS NOTES
[facility] dosing service    Follow-up Pharmacokinetic Consult for Vancomycin Dosing     Leslee Duverney is a 66year old female who is being treated for osteomyelitis. Patient is on day 7 of Vancomycin and is currently receiving 1.5 gm IV every 24 hours. Culture Result 1+ growth Staphylococcus capitis (A) N/A    Aerobic Smear 1+ WBCs seen N/A    Aerobic Smear No organisms seen N/A       Susceptibility    Staphylococcus capitis -  (no method available)     Cefazolin  Sensitive      Clindamycin <=0.25 Sensit

## 2019-05-29 NOTE — PROGRESS NOTES
[facility] Hospitalist Progress Note     Leslee Duverney Patient Status:  Inpatient    6/15/1955 MRN [id number]   [facility] 3SW-A Attending Calvin Sanchez MD   Hosp Day # 7 PCP Fco Angela MD     CC: follow up    SUBJECTIVE:  No acute evelio 325 mg Oral Daily with breakfast   • apixaban  2.5 mg Oral BID     Continuous Infusing Medication:    PRN Medication:traMADol HCl, ondansetron **OR** ondansetron HCl, HYDROcodone-acetaminophen **OR** HYDROcodone-acetaminophen, hydrALAzine HCl, Normal Salin

## 2019-05-29 NOTE — PROGRESS NOTES
Discharge instructions given to patient. All questions answered. Gave patient Rx for Oxycodone and ferrous sulfate. Also gave copy of Vanco Rx. Pt d/c'd home with HH and with PICC line for long term abx.

## 2019-05-29 NOTE — CM/SW NOTE
NHI and Vanco trough sent via [facility] to [employer] and [facility] at this time.      Dara Simons RN, [address] [place] [phone]

## 2019-05-29 NOTE — PLAN OF CARE
AAOx4. Moderate amount of pain reported by pt on his LLE, managed by oral pain meds PRN. Pain management plan explained, verbalized understanding. Knee immobilizer @ all times.  Coverlet dressing was changed last night @ 2100 for according to the pt it wasn

## 2019-05-30 NOTE — CM/SW NOTE
05/30/19 0700   Discharge disposition   Expected discharge disposition Home-Health   Name of [address] Imer Galvan Provider   ([facility])   E provider Other (comment)  ([facility] for IV

## 2019-06-05 PROBLEM — Z96.652 S/P REVISION OF TOTAL KNEE, LEFT: Status: ACTIVE | Noted: 2019-06-05

## 2019-06-13 NOTE — DISCHARGE SUMMARY
Discharge Summary  Patient ID:  Leslee Duverney  [id number]  55 year old  6/15/1955    Admit date: 5/22/2019    Discharge date and time: 5/29/19    Attending Physician: No att. providers found     Reason for admission: infected right TKR    Discharge Diagnoses: R-0    ferrous sulfate 325 (65 FE) MG Oral Tab EC  Take 1 tablet (325 mg total) by mouth daily with breakfast., Print Script, Disp-30 tablet, R-0    apixaban 2.5 MG Oral Tab  Take 1 tablet (2.5 mg total) by mouth 2 (two) times daily for 14 days. , Normal, D

## 2019-07-14 ENCOUNTER — HOSPITAL (OUTPATIENT)
Dept: OTHER | Age: 64
End: 2019-07-14

## 2019-07-14 PROCEDURE — 99284 EMERGENCY DEPT VISIT MOD MDM: CPT | Performed by: EMERGENCY MEDICINE

## 2019-07-29 PROCEDURE — 87081 CULTURE SCREEN ONLY: CPT | Performed by: FAMILY MEDICINE

## 2019-07-31 ENCOUNTER — APPOINTMENT (OUTPATIENT)
Dept: LAB | Facility: HOSPITAL | Age: 64
End: 2019-07-31
Attending: ORTHOPAEDIC SURGERY
Payer: COMMERCIAL

## 2019-07-31 DIAGNOSIS — Z96.659 INFECTION OF PROSTHETIC KNEE JOINT, SUBSEQUENT ENCOUNTER: ICD-10-CM

## 2019-07-31 DIAGNOSIS — T84.59XD INFECTION OF PROSTHETIC KNEE JOINT, SUBSEQUENT ENCOUNTER: ICD-10-CM

## 2019-07-31 PROCEDURE — 87081 CULTURE SCREEN ONLY: CPT

## 2019-08-12 ENCOUNTER — HOSPITAL ENCOUNTER (INPATIENT)
Facility: HOSPITAL | Age: 64
LOS: 4 days | Discharge: HOME HEALTH | DRG: 467 | End: 2019-08-16
Attending: ORTHOPAEDIC SURGERY | Admitting: ORTHOPAEDIC SURGERY
Payer: COMMERCIAL

## 2019-08-12 ENCOUNTER — APPOINTMENT (OUTPATIENT)
Dept: GENERAL RADIOLOGY | Facility: HOSPITAL | Age: 64
DRG: 467 | End: 2019-08-12
Attending: PHYSICIAN ASSISTANT
Payer: COMMERCIAL

## 2019-08-12 ENCOUNTER — ANESTHESIA EVENT (OUTPATIENT)
Dept: SURGERY | Facility: HOSPITAL | Age: 64
DRG: 467 | End: 2019-08-12
Payer: COMMERCIAL

## 2019-08-12 ENCOUNTER — ANESTHESIA (OUTPATIENT)
Dept: SURGERY | Facility: HOSPITAL | Age: 64
DRG: 467 | End: 2019-08-12
Payer: COMMERCIAL

## 2019-08-12 DIAGNOSIS — T84.59XD INFECTION OF PROSTHETIC KNEE JOINT, SUBSEQUENT ENCOUNTER: Primary | ICD-10-CM

## 2019-08-12 DIAGNOSIS — Z96.659 INFECTION OF PROSTHETIC KNEE JOINT, SUBSEQUENT ENCOUNTER: Primary | ICD-10-CM

## 2019-08-12 DIAGNOSIS — Z96.659 INFECTED PROSTHETIC KNEE JOINT, SUBSEQUENT ENCOUNTER: ICD-10-CM

## 2019-08-12 DIAGNOSIS — T84.59XD INFECTED PROSTHETIC KNEE JOINT, SUBSEQUENT ENCOUNTER: ICD-10-CM

## 2019-08-12 LAB
ANTIBODY SCREEN: NEGATIVE
RH BLOOD TYPE: POSITIVE

## 2019-08-12 PROCEDURE — 88311 DECALCIFY TISSUE: CPT | Performed by: ORTHOPAEDIC SURGERY

## 2019-08-12 PROCEDURE — 76942 ECHO GUIDE FOR BIOPSY: CPT | Performed by: ORTHOPAEDIC SURGERY

## 2019-08-12 PROCEDURE — 86900 BLOOD TYPING SEROLOGIC ABO: CPT | Performed by: ORTHOPAEDIC SURGERY

## 2019-08-12 PROCEDURE — 87070 CULTURE OTHR SPECIMN AEROBIC: CPT | Performed by: ORTHOPAEDIC SURGERY

## 2019-08-12 PROCEDURE — 0SRD0J9 REPLACEMENT OF LEFT KNEE JOINT WITH SYNTHETIC SUBSTITUTE, CEMENTED, OPEN APPROACH: ICD-10-PCS | Performed by: ORTHOPAEDIC SURGERY

## 2019-08-12 PROCEDURE — 87176 TISSUE HOMOGENIZATION CULTR: CPT | Performed by: ORTHOPAEDIC SURGERY

## 2019-08-12 PROCEDURE — 87205 SMEAR GRAM STAIN: CPT | Performed by: ORTHOPAEDIC SURGERY

## 2019-08-12 PROCEDURE — 87075 CULTR BACTERIA EXCEPT BLOOD: CPT | Performed by: ORTHOPAEDIC SURGERY

## 2019-08-12 PROCEDURE — 86850 RBC ANTIBODY SCREEN: CPT | Performed by: ORTHOPAEDIC SURGERY

## 2019-08-12 PROCEDURE — 3E0T3BZ INTRODUCTION OF ANESTHETIC AGENT INTO PERIPHERAL NERVES AND PLEXI, PERCUTANEOUS APPROACH: ICD-10-PCS | Performed by: ANESTHESIOLOGY

## 2019-08-12 PROCEDURE — 86920 COMPATIBILITY TEST SPIN: CPT

## 2019-08-12 PROCEDURE — 0SPD0EZ REMOVAL OF ARTICULATING SPACER FROM LEFT KNEE JOINT, OPEN APPROACH: ICD-10-PCS | Performed by: ORTHOPAEDIC SURGERY

## 2019-08-12 PROCEDURE — 88305 TISSUE EXAM BY PATHOLOGIST: CPT | Performed by: ORTHOPAEDIC SURGERY

## 2019-08-12 PROCEDURE — P9045 ALBUMIN (HUMAN), 5%, 250 ML: HCPCS

## 2019-08-12 PROCEDURE — 73560 X-RAY EXAM OF KNEE 1 OR 2: CPT | Performed by: PHYSICIAN ASSISTANT

## 2019-08-12 PROCEDURE — 0QUF0JZ SUPPLEMENT LEFT PATELLA WITH SYNTHETIC SUBSTITUTE, OPEN APPROACH: ICD-10-PCS | Performed by: ORTHOPAEDIC SURGERY

## 2019-08-12 PROCEDURE — 86901 BLOOD TYPING SEROLOGIC RH(D): CPT | Performed by: ORTHOPAEDIC SURGERY

## 2019-08-12 DEVICE — CEMENT BONE RADIOPAQ HOWMEDICA: Type: IMPLANTABLE DEVICE | Site: KNEE | Status: FUNCTIONAL

## 2019-08-12 RX ORDER — SODIUM PHOSPHATE, DIBASIC AND SODIUM PHOSPHATE, MONOBASIC 7; 19 G/133ML; G/133ML
1 ENEMA RECTAL ONCE AS NEEDED
Status: DISCONTINUED | OUTPATIENT
Start: 2019-08-12 | End: 2019-08-16

## 2019-08-12 RX ORDER — LABETALOL HYDROCHLORIDE 5 MG/ML
5 INJECTION, SOLUTION INTRAVENOUS EVERY 5 MIN PRN
Status: DISCONTINUED | OUTPATIENT
Start: 2019-08-12 | End: 2019-08-12 | Stop reason: HOSPADM

## 2019-08-12 RX ORDER — ACETAMINOPHEN 325 MG/1
TABLET ORAL
Status: COMPLETED
Start: 2019-08-12 | End: 2019-08-12

## 2019-08-12 RX ORDER — DIPHENHYDRAMINE HYDROCHLORIDE 50 MG/ML
12.5 INJECTION INTRAMUSCULAR; INTRAVENOUS AS NEEDED
Status: DISCONTINUED | OUTPATIENT
Start: 2019-08-12 | End: 2019-08-12 | Stop reason: HOSPADM

## 2019-08-12 RX ORDER — MIDAZOLAM HYDROCHLORIDE 1 MG/ML
1 INJECTION INTRAMUSCULAR; INTRAVENOUS EVERY 5 MIN PRN
Status: DISCONTINUED | OUTPATIENT
Start: 2019-08-12 | End: 2019-08-12 | Stop reason: HOSPADM

## 2019-08-12 RX ORDER — ACETAMINOPHEN 500 MG
1000 TABLET ORAL ONCE
Status: DISCONTINUED | OUTPATIENT
Start: 2019-08-12 | End: 2019-08-13 | Stop reason: HOSPADM

## 2019-08-12 RX ORDER — ACETAMINOPHEN 325 MG/1
650 TABLET ORAL ONCE
Status: COMPLETED | OUTPATIENT
Start: 2019-08-12 | End: 2019-08-12

## 2019-08-12 RX ORDER — OXYCODONE HYDROCHLORIDE 10 MG/1
10 TABLET ORAL EVERY 4 HOURS PRN
Status: DISPENSED | OUTPATIENT
Start: 2019-08-12 | End: 2019-08-15

## 2019-08-12 RX ORDER — ACETAMINOPHEN 500 MG
1000 TABLET ORAL ONCE
Status: DISCONTINUED | OUTPATIENT
Start: 2019-08-12 | End: 2019-08-12 | Stop reason: HOSPADM

## 2019-08-12 RX ORDER — CEFAZOLIN SODIUM/WATER 2 G/20 ML
2 SYRINGE (ML) INTRAVENOUS ONCE
Status: COMPLETED | OUTPATIENT
Start: 2019-08-12 | End: 2019-08-12

## 2019-08-12 RX ORDER — SCOLOPAMINE TRANSDERMAL SYSTEM 1 MG/1
PATCH, EXTENDED RELEASE TRANSDERMAL
Status: DISPENSED
Start: 2019-08-12 | End: 2019-08-12

## 2019-08-12 RX ORDER — DOCUSATE SODIUM 100 MG/1
100 CAPSULE, LIQUID FILLED ORAL 2 TIMES DAILY
Qty: 60 CAPSULE | Refills: 0 | Status: SHIPPED | OUTPATIENT
Start: 2019-08-12 | End: 2019-11-26 | Stop reason: ALTCHOICE

## 2019-08-12 RX ORDER — OXYCODONE HCL 10 MG/1
TABLET, FILM COATED, EXTENDED RELEASE ORAL
Status: DISPENSED
Start: 2019-08-12 | End: 2019-08-12

## 2019-08-12 RX ORDER — CEFAZOLIN SODIUM/WATER 2 G/20 ML
2 SYRINGE (ML) INTRAVENOUS EVERY 8 HOURS
Status: DISCONTINUED | OUTPATIENT
Start: 2019-08-12 | End: 2019-08-12 | Stop reason: SDUPTHER

## 2019-08-12 RX ORDER — SCOLOPAMINE TRANSDERMAL SYSTEM 1 MG/1
1 PATCH, EXTENDED RELEASE TRANSDERMAL ONCE
Status: DISCONTINUED | OUTPATIENT
Start: 2019-08-12 | End: 2019-08-13

## 2019-08-12 RX ORDER — POLYETHYLENE GLYCOL 3350 17 G/17G
17 POWDER, FOR SOLUTION ORAL DAILY PRN
Status: DISCONTINUED | OUTPATIENT
Start: 2019-08-12 | End: 2019-08-16

## 2019-08-12 RX ORDER — BISACODYL 10 MG
10 SUPPOSITORY, RECTAL RECTAL
Status: DISCONTINUED | OUTPATIENT
Start: 2019-08-12 | End: 2019-08-16

## 2019-08-12 RX ORDER — METOCLOPRAMIDE HYDROCHLORIDE 5 MG/ML
10 INJECTION INTRAMUSCULAR; INTRAVENOUS EVERY 6 HOURS PRN
Status: ACTIVE | OUTPATIENT
Start: 2019-08-12 | End: 2019-08-14

## 2019-08-12 RX ORDER — HYDROMORPHONE HYDROCHLORIDE 1 MG/ML
0.2 INJECTION, SOLUTION INTRAMUSCULAR; INTRAVENOUS; SUBCUTANEOUS EVERY 2 HOUR PRN
Status: ACTIVE | OUTPATIENT
Start: 2019-08-12 | End: 2019-08-14

## 2019-08-12 RX ORDER — PROCHLORPERAZINE EDISYLATE 5 MG/ML
10 INJECTION INTRAMUSCULAR; INTRAVENOUS EVERY 6 HOURS PRN
Status: ACTIVE | OUTPATIENT
Start: 2019-08-12 | End: 2019-08-14

## 2019-08-12 RX ORDER — TIZANIDINE 4 MG/1
4 TABLET ORAL 3 TIMES DAILY PRN
Status: DISCONTINUED | OUTPATIENT
Start: 2019-08-12 | End: 2019-08-13

## 2019-08-12 RX ORDER — NALOXONE HYDROCHLORIDE 0.4 MG/ML
80 INJECTION, SOLUTION INTRAMUSCULAR; INTRAVENOUS; SUBCUTANEOUS AS NEEDED
Status: DISCONTINUED | OUTPATIENT
Start: 2019-08-12 | End: 2019-08-12 | Stop reason: HOSPADM

## 2019-08-12 RX ORDER — METOCLOPRAMIDE HYDROCHLORIDE 5 MG/ML
10 INJECTION INTRAMUSCULAR; INTRAVENOUS AS NEEDED
Status: DISCONTINUED | OUTPATIENT
Start: 2019-08-12 | End: 2019-08-12 | Stop reason: HOSPADM

## 2019-08-12 RX ORDER — DOCUSATE SODIUM 100 MG/1
100 CAPSULE, LIQUID FILLED ORAL 2 TIMES DAILY
Status: DISCONTINUED | OUTPATIENT
Start: 2019-08-12 | End: 2019-08-16

## 2019-08-12 RX ORDER — OXYCODONE HCL 10 MG/1
10 TABLET, FILM COATED, EXTENDED RELEASE ORAL
Status: COMPLETED | OUTPATIENT
Start: 2019-08-13 | End: 2019-08-13

## 2019-08-12 RX ORDER — SENNOSIDES 8.6 MG
17.2 TABLET ORAL NIGHTLY
Status: DISCONTINUED | OUTPATIENT
Start: 2019-08-12 | End: 2019-08-16

## 2019-08-12 RX ORDER — SODIUM CHLORIDE, SODIUM LACTATE, POTASSIUM CHLORIDE, CALCIUM CHLORIDE 600; 310; 30; 20 MG/100ML; MG/100ML; MG/100ML; MG/100ML
INJECTION, SOLUTION INTRAVENOUS CONTINUOUS
Status: DISCONTINUED | OUTPATIENT
Start: 2019-08-12 | End: 2019-08-12 | Stop reason: HOSPADM

## 2019-08-12 RX ORDER — ONDANSETRON 2 MG/ML
4 INJECTION INTRAMUSCULAR; INTRAVENOUS EVERY 4 HOURS PRN
Status: DISPENSED | OUTPATIENT
Start: 2019-08-12 | End: 2019-08-14

## 2019-08-12 RX ORDER — MEPERIDINE HYDROCHLORIDE 25 MG/ML
12.5 INJECTION INTRAMUSCULAR; INTRAVENOUS; SUBCUTANEOUS AS NEEDED
Status: DISCONTINUED | OUTPATIENT
Start: 2019-08-12 | End: 2019-08-12 | Stop reason: HOSPADM

## 2019-08-12 RX ORDER — SODIUM CHLORIDE 9 MG/ML
INJECTION, SOLUTION INTRAVENOUS CONTINUOUS
Status: DISCONTINUED | OUTPATIENT
Start: 2019-08-12 | End: 2019-08-16

## 2019-08-12 RX ORDER — MELATONIN
325
Status: DISCONTINUED | OUTPATIENT
Start: 2019-08-13 | End: 2019-08-16

## 2019-08-12 RX ORDER — TRAMADOL HYDROCHLORIDE 50 MG/1
50 TABLET ORAL EVERY 6 HOURS
Status: DISCONTINUED | OUTPATIENT
Start: 2019-08-12 | End: 2019-08-16

## 2019-08-12 RX ORDER — CELECOXIB 200 MG/1
200 CAPSULE ORAL 2 TIMES DAILY
Qty: 60 CAPSULE | Refills: 0 | Status: SHIPPED | OUTPATIENT
Start: 2019-08-12 | End: 2019-11-26 | Stop reason: ALTCHOICE

## 2019-08-12 RX ORDER — KETOROLAC TROMETHAMINE 30 MG/ML
30 INJECTION, SOLUTION INTRAMUSCULAR; INTRAVENOUS EVERY 6 HOURS
Status: COMPLETED | OUTPATIENT
Start: 2019-08-12 | End: 2019-08-13

## 2019-08-12 RX ORDER — OXYCODONE HYDROCHLORIDE 5 MG/1
5 TABLET ORAL EVERY 4 HOURS PRN
Qty: 40 TABLET | Refills: 0 | Status: SHIPPED | OUTPATIENT
Start: 2019-08-12 | End: 2019-08-16

## 2019-08-12 RX ORDER — ONDANSETRON 2 MG/ML
4 INJECTION INTRAMUSCULAR; INTRAVENOUS AS NEEDED
Status: DISCONTINUED | OUTPATIENT
Start: 2019-08-12 | End: 2019-08-12 | Stop reason: HOSPADM

## 2019-08-12 RX ORDER — ACETAMINOPHEN 500 MG
1000 TABLET ORAL ONCE AS NEEDED
Status: DISCONTINUED | OUTPATIENT
Start: 2019-08-12 | End: 2019-08-12 | Stop reason: HOSPADM

## 2019-08-12 RX ORDER — CEFAZOLIN SODIUM/WATER 2 G/20 ML
2 SYRINGE (ML) INTRAVENOUS EVERY 8 HOURS
Status: COMPLETED | OUTPATIENT
Start: 2019-08-12 | End: 2019-08-13

## 2019-08-12 RX ORDER — ACETAMINOPHEN 500 MG
1000 TABLET ORAL EVERY 6 HOURS PRN
Status: ON HOLD | COMMUNITY
End: 2019-08-12

## 2019-08-12 RX ORDER — SCOLOPAMINE TRANSDERMAL SYSTEM 1 MG/1
1 PATCH, EXTENDED RELEASE TRANSDERMAL ONCE
Status: COMPLETED | OUTPATIENT
Start: 2019-08-12 | End: 2019-08-15

## 2019-08-12 RX ORDER — OXYCODONE HYDROCHLORIDE 5 MG/1
5 TABLET ORAL EVERY 4 HOURS PRN
Status: DISPENSED | OUTPATIENT
Start: 2019-08-12 | End: 2019-08-15

## 2019-08-12 RX ORDER — DIPHENHYDRAMINE HCL 25 MG
25 CAPSULE ORAL EVERY 4 HOURS PRN
Status: DISCONTINUED | OUTPATIENT
Start: 2019-08-12 | End: 2019-08-16

## 2019-08-12 RX ORDER — CEFAZOLIN SODIUM/WATER 2 G/20 ML
SYRINGE (ML) INTRAVENOUS
Status: DISPENSED
Start: 2019-08-12 | End: 2019-08-12

## 2019-08-12 RX ORDER — ACETAMINOPHEN 325 MG/1
650 TABLET ORAL 4 TIMES DAILY
Status: DISPENSED | OUTPATIENT
Start: 2019-08-12 | End: 2019-08-15

## 2019-08-12 RX ORDER — HYDROMORPHONE HYDROCHLORIDE 1 MG/ML
0.4 INJECTION, SOLUTION INTRAMUSCULAR; INTRAVENOUS; SUBCUTANEOUS EVERY 2 HOUR PRN
Status: ACTIVE | OUTPATIENT
Start: 2019-08-12 | End: 2019-08-14

## 2019-08-12 RX ORDER — OXYCODONE HCL 10 MG/1
10 TABLET, FILM COATED, EXTENDED RELEASE ORAL
Status: COMPLETED | OUTPATIENT
Start: 2019-08-12 | End: 2019-08-12

## 2019-08-12 RX ORDER — DIPHENHYDRAMINE HYDROCHLORIDE 50 MG/ML
25 INJECTION INTRAMUSCULAR; INTRAVENOUS ONCE AS NEEDED
Status: ACTIVE | OUTPATIENT
Start: 2019-08-12 | End: 2019-08-12

## 2019-08-12 RX ORDER — SODIUM CHLORIDE, SODIUM LACTATE, POTASSIUM CHLORIDE, CALCIUM CHLORIDE 600; 310; 30; 20 MG/100ML; MG/100ML; MG/100ML; MG/100ML
INJECTION, SOLUTION INTRAVENOUS CONTINUOUS
Status: DISCONTINUED | OUTPATIENT
Start: 2019-08-12 | End: 2019-08-16

## 2019-08-12 RX ORDER — DIPHENHYDRAMINE HYDROCHLORIDE 50 MG/ML
12.5 INJECTION INTRAMUSCULAR; INTRAVENOUS EVERY 4 HOURS PRN
Status: DISCONTINUED | OUTPATIENT
Start: 2019-08-12 | End: 2019-08-16

## 2019-08-12 RX ORDER — HYDROMORPHONE HYDROCHLORIDE 1 MG/ML
INJECTION, SOLUTION INTRAMUSCULAR; INTRAVENOUS; SUBCUTANEOUS
Status: COMPLETED
Start: 2019-08-12 | End: 2019-08-12

## 2019-08-12 RX ORDER — HYDROMORPHONE HYDROCHLORIDE 1 MG/ML
0.4 INJECTION, SOLUTION INTRAMUSCULAR; INTRAVENOUS; SUBCUTANEOUS EVERY 5 MIN PRN
Status: DISCONTINUED | OUTPATIENT
Start: 2019-08-12 | End: 2019-08-12 | Stop reason: HOSPADM

## 2019-08-12 RX ORDER — HYDROMORPHONE HYDROCHLORIDE 1 MG/ML
0.8 INJECTION, SOLUTION INTRAMUSCULAR; INTRAVENOUS; SUBCUTANEOUS EVERY 2 HOUR PRN
Status: ACTIVE | OUTPATIENT
Start: 2019-08-12 | End: 2019-08-14

## 2019-08-12 RX ORDER — OXYCODONE HYDROCHLORIDE 15 MG/1
15 TABLET ORAL EVERY 4 HOURS PRN
Status: ACTIVE | OUTPATIENT
Start: 2019-08-12 | End: 2019-08-15

## 2019-08-12 RX ORDER — ACETAMINOPHEN 500 MG
1000 TABLET ORAL EVERY 4 HOURS PRN
Qty: 80 TABLET | Refills: 0 | Status: SHIPPED | OUTPATIENT
Start: 2019-08-12 | End: 2020-12-22 | Stop reason: ALTCHOICE

## 2019-08-12 NOTE — ANESTHESIA POSTPROCEDURE EVALUATION
[address] [facility] Marce Salas Patient Status:  Surgery Admit - Inpt   Age/Gender 60year old female MRN [id number]   [facility] SURGERY Attending Ella Carney MD   Hosp Day # 0 PCP Brett Drummond MD       Anesthesia Post-op Note    Proce

## 2019-08-12 NOTE — INTERVAL H&P NOTE
Pre-op Diagnosis: Infected prosthetic knee joint, subsequent encounter [T84.59XD, H05.805]    The above referenced H&P was reviewed by Ayan Becerra MD on 8/12/2019, the patient was examined and no significant changes have occurred in the patient's condition

## 2019-08-12 NOTE — ANESTHESIA PREPROCEDURE EVALUATION
PRE-OP EVALUATION    Patient Name: Lanny Veronika    Pre-op Diagnosis: Infected prosthetic knee joint, subsequent encounter [T84.59XD, G90.438]    Procedure(s):  REVISION LEFT TOTAL KNEE REPLACEMENT    Surgeon(s) and Role:     Christine Solo MD - Primary left knee prosthesis  S/p gastric bypass -- pt states she is able to take NSAIDs without issue      Past Surgical History:   Procedure Laterality Date   •      • GASTRIC BYPASS,OBESE<100CM YURY-EN-Y     • HYSTEROSCOPY     • KNEE HARDWARE REMOVAL L canal and IPACK blocks along with total joint multimodal analgesic pathway discussed and all questions addressed. We also discussed possibility of failed IPACK block due to distortion of anatomy after previous surgeries.   Plan/risks discussed with: spouse

## 2019-08-12 NOTE — BRIEF OP NOTE
Pre-Operative Diagnosis: Infected prosthetic knee joint, subsequent encounter [T84.59XD, Z96.659]     Post-Operative Diagnosis: Infected prosthetic knee joint, subsequent encounter [T84.59XD, Z96.659]      Procedure Performed:   Procedure(s):  REVISION LEF

## 2019-08-13 LAB
ANION GAP SERPL CALC-SCNC: 5 MMOL/L (ref 0–18)
BASOPHILS # BLD AUTO: 0.01 X10(3) UL (ref 0–0.2)
BASOPHILS NFR BLD AUTO: 0.1 %
BUN BLD-MCNC: 15 MG/DL (ref 7–18)
BUN/CREAT SERPL: 18.5 (ref 10–20)
CALCIUM BLD-MCNC: 8.4 MG/DL (ref 8.5–10.1)
CHLORIDE SERPL-SCNC: 117 MMOL/L (ref 98–112)
CO2 SERPL-SCNC: 23 MMOL/L (ref 21–32)
CREAT BLD-MCNC: 0.81 MG/DL (ref 0.55–1.02)
DEPRECATED RDW RBC AUTO: 55.1 FL (ref 35.1–46.3)
DEPRECATED RDW RBC AUTO: 55.7 FL (ref 35.1–46.3)
EOSINOPHIL # BLD AUTO: 0.01 X10(3) UL (ref 0–0.7)
EOSINOPHIL NFR BLD AUTO: 0.1 %
ERYTHROCYTE [DISTWIDTH] IN BLOOD BY AUTOMATED COUNT: 18.6 % (ref 11–15)
ERYTHROCYTE [DISTWIDTH] IN BLOOD BY AUTOMATED COUNT: 18.6 % (ref 11–15)
GLUCOSE BLD-MCNC: 107 MG/DL (ref 70–99)
HCT VFR BLD AUTO: 24.1 % (ref 35–48)
HCT VFR BLD AUTO: 24.4 % (ref 35–48)
HGB BLD-MCNC: 7.3 G/DL (ref 12–16)
HGB BLD-MCNC: 7.4 G/DL (ref 12–16)
IMM GRANULOCYTES # BLD AUTO: 0.03 X10(3) UL (ref 0–1)
IMM GRANULOCYTES NFR BLD: 0.4 %
LYMPHOCYTES # BLD AUTO: 1.47 X10(3) UL (ref 1–4)
LYMPHOCYTES NFR BLD AUTO: 20.2 %
MCH RBC QN AUTO: 24.7 PG (ref 26–34)
MCH RBC QN AUTO: 24.7 PG (ref 26–34)
MCHC RBC AUTO-ENTMCNC: 30.3 G/DL (ref 31–37)
MCHC RBC AUTO-ENTMCNC: 30.3 G/DL (ref 31–37)
MCV RBC AUTO: 81.3 FL (ref 80–100)
MCV RBC AUTO: 81.4 FL (ref 80–100)
MONOCYTES # BLD AUTO: 0.79 X10(3) UL (ref 0.1–1)
MONOCYTES NFR BLD AUTO: 10.8 %
NEUTROPHILS # BLD AUTO: 4.98 X10 (3) UL (ref 1.5–7.7)
NEUTROPHILS # BLD AUTO: 4.98 X10(3) UL (ref 1.5–7.7)
NEUTROPHILS NFR BLD AUTO: 68.4 %
OSMOLALITY SERPL CALC.SUM OF ELEC: 301 MOSM/KG (ref 275–295)
PLATELET # BLD AUTO: 165 10(3)UL (ref 150–450)
PLATELET # BLD AUTO: 169 10(3)UL (ref 150–450)
POTASSIUM SERPL-SCNC: 5.2 MMOL/L (ref 3.5–5.1)
RBC # BLD AUTO: 2.96 X10(6)UL (ref 3.8–5.3)
RBC # BLD AUTO: 3 X10(6)UL (ref 3.8–5.3)
SODIUM SERPL-SCNC: 145 MMOL/L (ref 136–145)
WBC # BLD AUTO: 7.3 X10(3) UL (ref 4–11)
WBC # BLD AUTO: 8 X10(3) UL (ref 4–11)

## 2019-08-13 PROCEDURE — 97161 PT EVAL LOW COMPLEX 20 MIN: CPT

## 2019-08-13 PROCEDURE — 97165 OT EVAL LOW COMPLEX 30 MIN: CPT

## 2019-08-13 PROCEDURE — 85025 COMPLETE CBC W/AUTO DIFF WBC: CPT | Performed by: ORTHOPAEDIC SURGERY

## 2019-08-13 PROCEDURE — 97535 SELF CARE MNGMENT TRAINING: CPT

## 2019-08-13 PROCEDURE — 85027 COMPLETE CBC AUTOMATED: CPT | Performed by: ORTHOPAEDIC SURGERY

## 2019-08-13 PROCEDURE — 80048 BASIC METABOLIC PNL TOTAL CA: CPT | Performed by: HOSPITALIST

## 2019-08-13 PROCEDURE — 97530 THERAPEUTIC ACTIVITIES: CPT

## 2019-08-13 PROCEDURE — 97116 GAIT TRAINING THERAPY: CPT

## 2019-08-13 PROCEDURE — 97150 GROUP THERAPEUTIC PROCEDURES: CPT

## 2019-08-13 RX ORDER — TIZANIDINE 2 MG/1
1 TABLET ORAL 3 TIMES DAILY PRN
Status: DISCONTINUED | OUTPATIENT
Start: 2019-08-13 | End: 2019-08-16

## 2019-08-13 NOTE — OPERATIVE REPORT
[facility]    PATIENT'S NAME: Konradjosue Sagastume   ATTENDING PHYSICIAN: Rick Moffett M.D. OPERATING PHYSICIAN: Rick Moffett M.D.    PATIENT ACCOUNT#:   [de-identified]    LOCATION:  PACU [facility] PACU 7 EDWP 10  MEDICAL RECORD #:   [id number]       DATE OF BIRTH:  [birth year]1 lateral gutters were released. Suprapatellar scar tissues were also released to mobilize the patella and the femur. Cement molded femoral component as well as tibial cement component were removed.   Then, first proximal tibia was exposed, subluxating it a applied. Patella was then everted. There was a big patellar cavitary defect with very thin anterior cortex remaining only. The cement was removed, and this was bone grafted using 15 mL of crouton bone, packing it in there.   Then, using a synovial [place] ([place])

## 2019-08-13 NOTE — PROGRESS NOTES
Orthopedic surgery progress note    Sam Rosales Patient Status:  Inpatient    6/15/1955 MRN [id number]   [facility] 3SW-A Attending Rosendo Bryant MD   Hosp Day # 1 PCP Naga Wills MD       Subjective:  No major complaints.   No calf p

## 2019-08-13 NOTE — CM/SW NOTE
08/13/19 1500   CM/SW Referral Data   Referral Source Physician   Reason for Referral Discharge planning   Informant Patient   Pertinent Medical Hx   Primary Care Physician Name Tino Ricardo   Patient Info   Patient's Mental Status Alert;Oriented

## 2019-08-13 NOTE — PROGRESS NOTES
Dr. Nav Vance on call for Dr. Ervin Mancini was paged re consult, awaiting call back    0710: Dr. Nva Vance called back said he would see pt later

## 2019-08-13 NOTE — PLAN OF CARE
Admitted from PACU, [address] [facility]. She denies any nausea, light headedness. BP within normal range. IV fluids maintained. Acewrap dressing is C/D/I. Ice gel maintained on the left knee. Denies numbness and or tingling. Pain management plan explained.  Ankle pumps en

## 2019-08-13 NOTE — PROGRESS NOTES
Operative leg measured for tubigrip. Size F applied to left calf and size J applied to left knee. Patient instructed; verbalized understanding.

## 2019-08-13 NOTE — OCCUPATIONAL THERAPY NOTE
OCCUPATIONAL THERAPY EVALUATION - INPATIENT     Room Number: 384/384-A  Evaluation Date: 8/13/2019  Type of Evaluation: Initial  Presenting Problem: s/p revision L TKA 8/12/19    Physician Order: IP Consult to Occupational Therapy  Reason for Therapy: ADL/ retired.     OBJECTIVE  Precautions: None  Fall Risk: High fall risk    WEIGHT BEARING RESTRICTION  Weight Bearing Restriction: L lower extremity           L Lower Extremity: Weight Bearing as Tolerated    PAIN ASSESSMENT  Ratin  Location: L knee  Manag dressing techniques/equipment, patient declining any ADLs this session; standing via RW and CGA with safety cues; functional mobility to hill and back into room via RW, min assist, increased time, and cues for upright posture; sitting via min assist and cu Home(with family assist)  OT Device Recommendations: TBD    PLAN  OT Treatment Plan: Balance activities; ADL training;Functional transfer training; Endurance training;Patient/Family education;Patient/Family training; Compensatory technique education  Rehab Po

## 2019-08-13 NOTE — CONSULTS
[facility] Hospitalist Initial Consult       Reason for Consult: Medical Management sp revision L TKA for infection L TKA    History of Present Illness: Patient is a 60year old female with PMH sig for hx of OA s/p TKA cb infection who presents sp the above proce KNEE REPLACEMENT SURGERY     • [facility] KNEE JT+ANESTHESIA Right 11/28/2017    Dr Chivo Frost   • [facility] KNEE JT+ANESTHESIA Left 05/08/2018    Dr Brittany Pulliam   • TOTAL KNEE REPLACEMENT Left 11/28/2017    Dr Brittany Pulliam   • [address] following last procedure    BP running low'normal. Continue to monitor. Prevention: Eliquis, SCDs, bowel regimen      Outpatient records and previous hospital records reviewed. Thank you for allowing me to participate in the care of this patient.

## 2019-08-13 NOTE — PHYSICAL THERAPY NOTE
PHYSICAL THERAPY KNEE EVALUATION - INPATIENT     Room Number: 384/384-A  Evaluation Date: 8/13/2019  Type of Evaluation: Initial  Physician Order: PT Eval and Treat    Presenting Problem: s/p revision left TKA 8/12/19  Reason for Therapy: Mobility Dysfunct None  Fall Risk: High fall risk    WEIGHT BEARING RESTRICTION  Weight Bearing Restriction: L lower extremity           L Lower Extremity: Weight Bearing as Tolerated    PAIN ASSESSMENT  Ratin  Location: left knee  Management Techniques:  Activity promot supine. Pt educated in role of PT, group therapy, ankle pumps, quad sets, goals for session. No KI indicated at pt able to perform ind slr. Pt transferred supine to sit with min a for left LE only, good sitting balance, no dizziness, sitting bp 111/60. inpatient PT to address the above deficits to assist patient in returning to prior to level of function.   DISCHARGE RECOMMENDATIONS  PT Discharge Recommendations: Home with home health PT    PLAN  PT Treatment Plan: Energy conservation;Patient education;Ga

## 2019-08-13 NOTE — PLAN OF CARE
Left knee with ace wrap dressing clean and dry. Gel ice pack applied to knee. Verbalized with minimal pain, Tramadol and Toradol given as ordered.     Able to participate with group PT, denies having dizziness or lightheadedness  Hbg. 7.3 this morning, re

## 2019-08-13 NOTE — PHYSICAL THERAPY NOTE
PHYSICAL THERAPY KNEE TREATMENT NOTE - INPATIENT     Room Number: 384/384-A     Session: 1&2   Number of Visits to Meet Established Goals: 3    Presenting Problem: s/p revision left TKA 8/12/19    History related to current admission: pt with h/o left TKA does the patient currently have. ..  -   Turning over in bed (including adjusting bedclothes, sheets and blankets)?: A Little   -   Sitting down on and standing up from a chair with arms (e.g., wheelchair, bedside commode, etc.): A Little   -   Moving from Standing knee flexion 10 reps 15 reps   Extension stretch  1x 1x     Comments: Pt participated in group session, tolerance was good.    was present yes   is a spouse AM, therapist PM     Knee ROM      L Knee Flexion (degrees): 73     L Knee Exte

## 2019-08-14 LAB
ANION GAP SERPL CALC-SCNC: 6 MMOL/L (ref 0–18)
BASOPHILS # BLD AUTO: 0.02 X10(3) UL (ref 0–0.2)
BASOPHILS # BLD AUTO: 0.03 X10(3) UL (ref 0–0.2)
BASOPHILS NFR BLD AUTO: 0.3 %
BASOPHILS NFR BLD AUTO: 0.4 %
BUN BLD-MCNC: 13 MG/DL (ref 7–18)
BUN/CREAT SERPL: 16.9 (ref 10–20)
CALCIUM BLD-MCNC: 7.9 MG/DL (ref 8.5–10.1)
CHLORIDE SERPL-SCNC: 114 MMOL/L (ref 98–112)
CO2 SERPL-SCNC: 22 MMOL/L (ref 21–32)
CREAT BLD-MCNC: 0.77 MG/DL (ref 0.55–1.02)
DEPRECATED RDW RBC AUTO: 55.5 FL (ref 35.1–46.3)
DEPRECATED RDW RBC AUTO: 55.9 FL (ref 35.1–46.3)
EOSINOPHIL # BLD AUTO: 0.07 X10(3) UL (ref 0–0.7)
EOSINOPHIL # BLD AUTO: 0.07 X10(3) UL (ref 0–0.7)
EOSINOPHIL NFR BLD AUTO: 0.9 %
EOSINOPHIL NFR BLD AUTO: 1 %
ERYTHROCYTE [DISTWIDTH] IN BLOOD BY AUTOMATED COUNT: 18.2 % (ref 11–15)
ERYTHROCYTE [DISTWIDTH] IN BLOOD BY AUTOMATED COUNT: 18.6 % (ref 11–15)
GLUCOSE BLD-MCNC: 97 MG/DL (ref 70–99)
HCT VFR BLD AUTO: 19.6 % (ref 35–48)
HCT VFR BLD AUTO: 24.1 % (ref 35–48)
HGB BLD-MCNC: 6.1 G/DL (ref 12–16)
HGB BLD-MCNC: 7.4 G/DL (ref 12–16)
HGB BLD-MCNC: 7.4 G/DL (ref 12–16)
IMM GRANULOCYTES # BLD AUTO: 0.01 X10(3) UL (ref 0–1)
IMM GRANULOCYTES # BLD AUTO: 0.02 X10(3) UL (ref 0–1)
IMM GRANULOCYTES NFR BLD: 0.1 %
IMM GRANULOCYTES NFR BLD: 0.3 %
LYMPHOCYTES # BLD AUTO: 1.21 X10(3) UL (ref 1–4)
LYMPHOCYTES # BLD AUTO: 1.51 X10(3) UL (ref 1–4)
LYMPHOCYTES NFR BLD AUTO: 18 %
LYMPHOCYTES NFR BLD AUTO: 20.2 %
MCH RBC QN AUTO: 25.3 PG (ref 26–34)
MCH RBC QN AUTO: 25.5 PG (ref 26–34)
MCHC RBC AUTO-ENTMCNC: 30.7 G/DL (ref 31–37)
MCHC RBC AUTO-ENTMCNC: 31.1 G/DL (ref 31–37)
MCV RBC AUTO: 81.3 FL (ref 80–100)
MCV RBC AUTO: 83.1 FL (ref 80–100)
MONOCYTES # BLD AUTO: 0.7 X10(3) UL (ref 0.1–1)
MONOCYTES # BLD AUTO: 0.8 X10(3) UL (ref 0.1–1)
MONOCYTES NFR BLD AUTO: 10.4 %
MONOCYTES NFR BLD AUTO: 10.7 %
NEUTROPHILS # BLD AUTO: 4.7 X10 (3) UL (ref 1.5–7.7)
NEUTROPHILS # BLD AUTO: 4.7 X10(3) UL (ref 1.5–7.7)
NEUTROPHILS # BLD AUTO: 5.07 X10 (3) UL (ref 1.5–7.7)
NEUTROPHILS # BLD AUTO: 5.07 X10(3) UL (ref 1.5–7.7)
NEUTROPHILS NFR BLD AUTO: 67.7 %
NEUTROPHILS NFR BLD AUTO: 70 %
OSMOLALITY SERPL CALC.SUM OF ELEC: 294 MOSM/KG (ref 275–295)
PLATELET # BLD AUTO: 147 10(3)UL (ref 150–450)
PLATELET # BLD AUTO: 159 10(3)UL (ref 150–450)
POTASSIUM SERPL-SCNC: 4.1 MMOL/L (ref 3.5–5.1)
RBC # BLD AUTO: 2.41 X10(6)UL (ref 3.8–5.3)
RBC # BLD AUTO: 2.9 X10(6)UL (ref 3.8–5.3)
SODIUM SERPL-SCNC: 142 MMOL/L (ref 136–145)
WBC # BLD AUTO: 6.7 X10(3) UL (ref 4–11)
WBC # BLD AUTO: 7.5 X10(3) UL (ref 4–11)

## 2019-08-14 PROCEDURE — 30233N1 TRANSFUSION OF NONAUTOLOGOUS RED BLOOD CELLS INTO PERIPHERAL VEIN, PERCUTANEOUS APPROACH: ICD-10-PCS | Performed by: ORTHOPAEDIC SURGERY

## 2019-08-14 PROCEDURE — 80048 BASIC METABOLIC PNL TOTAL CA: CPT | Performed by: HOSPITALIST

## 2019-08-14 PROCEDURE — 85018 HEMOGLOBIN: CPT | Performed by: HOSPITALIST

## 2019-08-14 PROCEDURE — 36430 TRANSFUSION BLD/BLD COMPNT: CPT

## 2019-08-14 PROCEDURE — 97150 GROUP THERAPEUTIC PROCEDURES: CPT

## 2019-08-14 PROCEDURE — 97116 GAIT TRAINING THERAPY: CPT

## 2019-08-14 PROCEDURE — 85025 COMPLETE CBC W/AUTO DIFF WBC: CPT | Performed by: HOSPITALIST

## 2019-08-14 RX ORDER — SODIUM CHLORIDE 9 MG/ML
INJECTION, SOLUTION INTRAVENOUS ONCE
Status: COMPLETED | OUTPATIENT
Start: 2019-08-14 | End: 2019-08-14

## 2019-08-14 NOTE — PHYSICAL THERAPY NOTE
PHYSICAL THERAPY KNEE TREATMENT NOTE - INPATIENT     Room Number: 384/384-A     Session:3  Number of Visits to Meet Established Goals: 3    Presenting Problem: s/p revision left TKA 8/12/19    History related to current admission: pt with h/o left  INPATIENT SHORT FORM - BASIC MOBILITY  How much difficulty does the patient currently have. ..  -   Turning over in bed (including adjusting bedclothes, sheets and blankets)?: None   -   Sitting down on and standing up from a chair with arms (e.g., wheelcha 13    Patient End of Session: Up in chair;Needs met;Call light within reach;RN aware of session/findings; All patient questions and concerns addressed    ASSESSMENT   Pt continues to present with impaired strength , endurance and balance below PLOF and will

## 2019-08-14 NOTE — PROGRESS NOTES
INFECTIOUS DISEASE PROGRESS NOTE    [place] DalilaHelena Patient Status:  Inpatient    6/15/1955 MRN [id number]   [facility] 3SW-A Attending Calvin Sanchez MD   Hosp Day # 2 PCP Nancy Pardo Prochlorperazine Edisylate (COMPAZINE) injection 10 mg, 10 mg, Intravenous, Q6H PRN  •  ferrous sulfate EC tab 325 mg, 325 mg, Oral, Daily with breakfast  •  diphenhydrAMINE (BENADRYL) cap/tab 25 mg, 25 mg, Oral, Q4H PRN **OR** diphenhydrAMINE HCl (BENADRY Culture Result No Growth 2 Days N/A    Tissue Smear 2+ WBCs seen N/A    Tissue Smear No organisms seen N/A   2.  ANAEROBIC CULTURE     Status: None (Preliminary result)    Collection Time: 08/12/19  3:32 PM   Result Value Ref Range    Anaerobic Culture Pend

## 2019-08-14 NOTE — PROGRESS NOTES
Feels better after 1 PRBC tx. No major co.    Temp:  [97.9 °F (36.6 °C)-99.3 °F (37.4 °C)] 98.7 °F (37.1 °C)  Pulse:  [] 68  Resp:  [18-20] 20  BP: ()/(49-57) 92/49    No distress. Left knee dressing is intact. No calf tenderness.   MS intact

## 2019-08-14 NOTE — OCCUPATIONAL THERAPY NOTE
Attempted to see patient for OT services this pm, however patient had just received lunch. Will reattempt as able per patient request. Second attempt later in pm, patient politely declining due to fatigue.  Will reattempt tomorrow per patient request.

## 2019-08-14 NOTE — PROGRESS NOTES
[facility] Hospitalist Progress Note                                                                   [id number]  6/15/1955    CC: FU post op    Interval History:  - Hgb 6.1 today, no change in 165.0 169.0 147.0*       Recent Labs   Lab 08/13/19  0508   *   BUN 15   CREATSERUM 0.81   GFRAA 90   GFRNAA 78   CA 8.4*      K 5.2*   *   CO2 23.0           ROS: no change to ROS from my documentation yesterday, except as otherwise not

## 2019-08-14 NOTE — CONSULTS
INFECTIOUS DISEASE CONSULT NOTE    Ethyl Ryder Patient Status:  Inpatient    6/15/1955 MRN [id number]   [facility] 3SW-A Attending Willie Up MD   Hosp Day # 1 PCP [facility] never used smokeless tobacco. She reports that she drank alcohol. She reports that she does not use drugs.     Allergies:    Nickel                  HIVES  Lactose Intolerance     NAUSEA ONLY    Medications:    Current Facility-Administered Medications:   • sulfate EC tab 325 mg, 325 mg, Oral, Daily with breakfast  •  diphenhydrAMINE (BENADRYL) cap/tab 25 mg, 25 mg, Oral, Q4H PRN **OR** diphenhydrAMINE HCl (BENADRYL) injection 12.5 mg, 12.5 mg, Intravenous, Q4H PRN  •  0.9% NaCl infusion, , Intravenous, Sd Cantrell (Preliminary result)    Collection Time: 08/12/19  3:32 PM   Result Value Ref Range    Anaerobic Culture Pending N/A         Radiology: Xr Knee (1 Or 2 Views), Left (cpt=73560)    Result Date: 8/12/2019  PROCEDURE:  XR KNEE (1 OR 2 VIEWS), LEFT (CPT=73560)

## 2019-08-14 NOTE — PLAN OF CARE
Patient gets up with min assist with walker ,pain is well controlled with tramadol ,dressing to left knee clean and dry ,ice in place ,vital signs stable ,denies any dizziness or chest pain ,encouraged use of incentive spirometer and ankle pumps ,all safet

## 2019-08-14 NOTE — PLAN OF CARE
Hgb=6.1, 1 unit prbc's given, post op hgb level at 1400, kaleb well, PT later today, updated on plan of care, d/c planning w/ HH on d/c.

## 2019-08-14 NOTE — CM/SW NOTE
Garza from [place] with [address] [facility] home health care. They are able to accept pt. Will send final AVS at discharge.      Duglas Williamson RN, [facility]  Extension [phone]

## 2019-08-15 LAB
BLOOD TYPE BARCODE: 5100
DEPRECATED RDW RBC AUTO: 54.1 FL (ref 35.1–46.3)
ERYTHROCYTE [DISTWIDTH] IN BLOOD BY AUTOMATED COUNT: 18 % (ref 11–15)
HCT VFR BLD AUTO: 21.4 % (ref 35–48)
HGB BLD-MCNC: 6.6 G/DL (ref 12–16)
HGB BLD-MCNC: 8.4 G/DL (ref 12–16)
MCH RBC QN AUTO: 25.4 PG (ref 26–34)
MCHC RBC AUTO-ENTMCNC: 30.8 G/DL (ref 31–37)
MCV RBC AUTO: 82.3 FL (ref 80–100)
PLATELET # BLD AUTO: 151 10(3)UL (ref 150–450)
RBC # BLD AUTO: 2.6 X10(6)UL (ref 3.8–5.3)
WBC # BLD AUTO: 6.6 X10(3) UL (ref 4–11)

## 2019-08-15 PROCEDURE — 36430 TRANSFUSION BLD/BLD COMPNT: CPT

## 2019-08-15 PROCEDURE — 97535 SELF CARE MNGMENT TRAINING: CPT

## 2019-08-15 PROCEDURE — 85027 COMPLETE CBC AUTOMATED: CPT | Performed by: ORTHOPAEDIC SURGERY

## 2019-08-15 PROCEDURE — 97530 THERAPEUTIC ACTIVITIES: CPT

## 2019-08-15 PROCEDURE — 85018 HEMOGLOBIN: CPT | Performed by: ORTHOPAEDIC SURGERY

## 2019-08-15 PROCEDURE — 97116 GAIT TRAINING THERAPY: CPT

## 2019-08-15 PROCEDURE — 97150 GROUP THERAPEUTIC PROCEDURES: CPT

## 2019-08-15 RX ORDER — SODIUM CHLORIDE 9 MG/ML
INJECTION, SOLUTION INTRAVENOUS ONCE
Status: DISCONTINUED | OUTPATIENT
Start: 2019-08-15 | End: 2019-08-15

## 2019-08-15 RX ORDER — ACETAMINOPHEN 325 MG/1
650 TABLET ORAL ONCE
Status: DISCONTINUED | OUTPATIENT
Start: 2019-08-15 | End: 2019-08-15

## 2019-08-15 NOTE — PROGRESS NOTES
Orthopedic surgery progress note    Corazon Jaramillo Patient Status:  Inpatient    6/15/1955 MRN [id number]   [facility] 3SW-A Attending Marci Walls MD   Hosp Day # 3 PCP Jarred Becker MD       Subjective:  No major complaints.   No calf p

## 2019-08-15 NOTE — OCCUPATIONAL THERAPY NOTE
OCCUPATIONAL THERAPY TREATMENT NOTE - INPATIENT     Room Number: 384/384-A  Session: 1  Number of Visits to Meet Established Goals: 2    Presenting Problem: s/p revision L TKA 8/12/19    History related to current admission: Patient is 60year old female a clothing?: A Little(supervision)  -   Bathing (including washing, rinsing, drying)?: A Little(supervision)  -   Toileting, which includes using toilet, bedpan or urinal? : A Little(supervision)  -   Putting on and taking off regular upper body clothing?: N will be staying with initially, reports they \"help each other out\"), sister who will also be staying at mother's house, and  who is able to assist as needed.  Patient also with good recall and return demo incorporating knee/surgical precautions int

## 2019-08-15 NOTE — PROGRESS NOTES
INFECTIOUS DISEASE PROGRESS NOTE    Sam Rosales Patient Status:  Inpatient    6/15/1955 MRN [id number]   [facility] 3SW-A Attending Rosendo Bryant MD   [facility] Day # 3 PCP Naga Wills, weight 175 lb 0.7 oz (79.4 kg), SpO2 98 %, not currently breastfeeding. Temp (24hrs), Av.6 °F (36.4 °C), Min:96.6 °F (35.9 °C), Max:98.2 °F (36.8 °C)  HEENT: no scleral icterus or conjunctival injection. Moist mucous membranes.    Neck: No lymphadenopa offered no additional history at this time. FINDINGS:  Sequelae of revision arthroplasty of the left knee is noted. Extended stems appear well seated. No fracture or dislocation is identified. Skin staples are noted.      IMPRESSION:  Sequelae of revi

## 2019-08-15 NOTE — PHYSICAL THERAPY NOTE
PHYSICAL THERAPY KNEE TREATMENT NOTE - INPATIENT     Room Number: 384/384-A     Session:4  Number of Visits to Meet Established Goals: 3    Presenting Problem: s/p revision left TKA 8/12/19    History related to current admission: pt with h/o left  the patient currently have. ..  -   Turning over in bed (including adjusting bedclothes, sheets and blankets)?: None   -   Sitting down on and standing up from a chair with arms (e.g., wheelchair, bedside commode, etc.): None   -   Moving from lying on back Standing knee flexion 20 reps   Extension stretch  1x     Comments: Pt participated in group session, tolerance was good.       Knee ROM      L Knee Flexion (degrees): 55     L Knee Extension (degrees): 5    Patient End of Session: Up in chair;Needs met;C established on 8/13/2019            ROM goal ongoing 8/15/19

## 2019-08-15 NOTE — PROGRESS NOTES
Rn notified Dr Chandrika Mcleod of repeat hgb results this pm. Md ordered to repeat labs in am, if stable, ok to d/c home tmrw. Md ordered to continue patient on eliquis as ordered. Rn discussed above with patient.

## 2019-08-15 NOTE — PROGRESS NOTES
[facility] Hospitalist Progress Note                                                                   [id number]  6/15/1955    CC: FU post op    Interval History:  - Hgb down again today- getti 5. 2* 4.1   * 114*   CO2 23.0 22.0           ROS: no change to ROS from my documentation yesterday, except as otherwise noted in the Interval History above. Assessment/Plan:    S/P review TKA for infected prior TKA  - Post op care per ortho.   Ovidio Pittman

## 2019-08-15 NOTE — PLAN OF CARE
Assumed care for this pt at [address] [place] [address]. Pt alert and oriented x4. Pt denies any numbness or tingling. Pain meds po continue. Pt aware of possible discharge home this day. Ice for comfort. VSS, pt eating, drinking and urinating. Will continue to follow.  Pt ambula

## 2019-08-15 NOTE — PLAN OF CARE
Md discussed need for another unit of blood to be given this am and then blood started as ordered this am, consent on chart. No s/s of reaction noted. Montez well. Left knee aquacell drsg remains intact with scant drainage noted. Edema noted to left leg.  Sens

## 2019-08-16 VITALS
BODY MASS INDEX: 31.02 KG/M2 | HEIGHT: 63 IN | RESPIRATION RATE: 16 BRPM | SYSTOLIC BLOOD PRESSURE: 95 MMHG | DIASTOLIC BLOOD PRESSURE: 61 MMHG | TEMPERATURE: 99 F | WEIGHT: 175.06 LBS | HEART RATE: 84 BPM | OXYGEN SATURATION: 99 %

## 2019-08-16 LAB
BASOPHILS # BLD AUTO: 0.02 X10(3) UL (ref 0–0.2)
BASOPHILS NFR BLD AUTO: 0.4 %
BLOOD TYPE BARCODE: 5100
DEPRECATED RDW RBC AUTO: 54.8 FL (ref 35.1–46.3)
EOSINOPHIL # BLD AUTO: 0.17 X10(3) UL (ref 0–0.7)
EOSINOPHIL NFR BLD AUTO: 3 %
ERYTHROCYTE [DISTWIDTH] IN BLOOD BY AUTOMATED COUNT: 17.8 % (ref 11–15)
HCT VFR BLD AUTO: 24.8 % (ref 35–48)
HGB BLD-MCNC: 7.7 G/DL (ref 12–16)
IMM GRANULOCYTES # BLD AUTO: 0.02 X10(3) UL (ref 0–1)
IMM GRANULOCYTES NFR BLD: 0.4 %
LYMPHOCYTES # BLD AUTO: 1.6 X10(3) UL (ref 1–4)
LYMPHOCYTES NFR BLD AUTO: 28.2 %
MCH RBC QN AUTO: 25.8 PG (ref 26–34)
MCHC RBC AUTO-ENTMCNC: 31 G/DL (ref 31–37)
MCV RBC AUTO: 82.9 FL (ref 80–100)
MONOCYTES # BLD AUTO: 0.58 X10(3) UL (ref 0.1–1)
MONOCYTES NFR BLD AUTO: 10.2 %
NEUTROPHILS # BLD AUTO: 3.28 X10 (3) UL (ref 1.5–7.7)
NEUTROPHILS # BLD AUTO: 3.28 X10(3) UL (ref 1.5–7.7)
NEUTROPHILS NFR BLD AUTO: 57.8 %
PLATELET # BLD AUTO: 173 10(3)UL (ref 150–450)
RBC # BLD AUTO: 2.99 X10(6)UL (ref 3.8–5.3)
WBC # BLD AUTO: 5.7 X10(3) UL (ref 4–11)

## 2019-08-16 PROCEDURE — 85025 COMPLETE CBC W/AUTO DIFF WBC: CPT | Performed by: ORTHOPAEDIC SURGERY

## 2019-08-16 RX ORDER — TRAMADOL HYDROCHLORIDE 50 MG/1
50 TABLET ORAL EVERY 6 HOURS PRN
Qty: 60 TABLET | Refills: 0 | Status: SHIPPED | OUTPATIENT
Start: 2019-08-16 | End: 2019-11-26 | Stop reason: ALTCHOICE

## 2019-08-16 RX ORDER — MELATONIN
325
Qty: 30 TABLET | Refills: 0 | Status: SHIPPED | OUTPATIENT
Start: 2019-08-17 | End: 2019-11-26 | Stop reason: ALTCHOICE

## 2019-08-16 NOTE — PROGRESS NOTES
[address]  Hospitalist Progress Note                                                                   [id number]  6/15/1955    CC: FU post op    Interval History:  - hg stable.   Pain controlled yesterday, except as otherwise noted in the Interval History above. Assessment/Plan:    S/P review TKA for infected prior TKA  - Post op care per ortho. Continue PT/OT.    - ID consulted for Abx recs- no abx required on dc       Anemia  - acute blood lo

## 2019-08-16 NOTE — PROGRESS NOTES
INFECTIOUS DISEASE PROGRESS NOTE    Ann Guerrero Patient Status:  Inpatient    6/15/1955 MRN [id number]   [facility] 3SW-A Attending Monica Wen MD   [facility] Day # 4 PCP Nissa Montes, rhonchi. Cardiovascular: S1, S2.  Regular rate and rhythm. No murmurs. Abdomen: Soft, nontender, nondistended. Positive bowel sounds.   Musculoskeletal: L knee with c/d/d (not removed)  Integument: No rash of exposed skin    Laboratory Data:    Recent L arthroplasty is identified.   Dictated by: Kalpana Carter MD on 8/12/2019 at 17:05     Approved by: Kalpana Carter MD             ASSESSMENT/ PLAN:    1. PJI L TKA with OM of distal femur due to coag neg staph (S epidermidis and capitis) and P acnes

## 2019-08-16 NOTE — CM/SW NOTE
Discharge AVS sent via [facility].       08/16/19 1600   Discharge disposition   Expected discharge disposition Home-Health   Name of Facillity/Home Care/Hospice   ([address])   Home services after discharge Skilled home care   Discharge transportation [place]

## 2019-08-16 NOTE — PROGRESS NOTES
NURSING DISCHARGE NOTE    Discharged Home via Wheelchair. Accompanied by Spouse  Belongings Taken by patient/family   Discharge instruction discussed with patient, she verbalized understanding. Script for [facility] given to patient. Hari Benson

## 2019-08-16 NOTE — PROGRESS NOTES
Orthopedic surgery progress note    Carmen Smith Patient Status:  Inpatient    6/15/1955 MRN [id number]   [facility] 3SW-A Attending Bridget Gunn MD   Hosp Day # 4 PCP Dori Harding MD       Subjective:  No major complaints.   No calf p

## 2019-08-16 NOTE — PLAN OF CARE
Hemoglobin 7.7, Dr. Ernestina Houser notified and OK for discharge to home. Dr. María Marmolejo seen patient, no discharge antibiotic needed. Dr. Tierra Davalos seen patient and cleared her for discharge home. Patient will be discharge with referral to [facility].

## 2019-08-16 NOTE — PLAN OF CARE
POD 3 Lt total knee revision, Pt is aaox4, vss, room air, SCD's, voiding freely to restroom, BM 8/15, IV SL, Eliquis, Aquacel and tubi- to Lt lower leg, gel ice applied as needed, pain controlled with PO medications,  Pt up with min assist and walker,

## 2019-08-18 NOTE — DISCHARGE SUMMARY
Discharge Summary  Patient ID:  Tamar Yu  [id number]  60year old  6/15/1955    Admit date: 8/12/2019    Discharge date and time: 8/16/19    Attending Physician: No att. providers found     Reason for admission: infected left knee prosthetic joint    D total) by mouth 2 (two) times daily. , Normal, Disp-60 capsule, R-0    acetaminophen 500 MG Oral Tab  Take 2 tablets (1,000 mg total) by mouth every 4 (four) hours as needed for Pain., Normal, Disp-80 tablet, R-0    docusate sodium (COLACE) 100 MG Oral Cap

## 2019-09-10 PROBLEM — R29.898 DECREASED STRENGTH OF LOWER EXTREMITY: Status: ACTIVE | Noted: 2019-09-10

## 2022-02-10 PROBLEM — E66.09 CLASS 2 OBESITY DUE TO EXCESS CALORIES WITHOUT SERIOUS COMORBIDITY WITH BODY MASS INDEX (BMI) OF 35.0 TO 35.9 IN ADULT: Status: ACTIVE | Noted: 2022-02-10

## 2022-02-10 PROBLEM — K52.9 ENTERITIS: Status: ACTIVE | Noted: 2022-02-10

## 2022-02-10 PROBLEM — K64.0 GRADE I HEMORRHOIDS: Status: ACTIVE | Noted: 2022-02-10

## 2022-03-02 PROBLEM — R26.9 ABNORMALITY OF GAIT: Status: RESOLVED | Noted: 2017-12-18 | Resolved: 2022-03-02

## 2022-03-02 PROBLEM — R29.898 DECREASED STRENGTH OF LOWER EXTREMITY: Status: RESOLVED | Noted: 2019-09-10 | Resolved: 2022-03-02

## 2022-03-02 PROBLEM — N30.00 ACUTE CYSTITIS WITHOUT HEMATURIA: Status: ACTIVE | Noted: 2022-03-02

## 2022-03-02 PROBLEM — M25.662 DECREASED RANGE OF MOTION OF LEFT KNEE: Status: RESOLVED | Noted: 2018-05-11 | Resolved: 2022-03-02

## 2022-03-02 PROBLEM — Z01.818 PRE-OP EXAM: Status: RESOLVED | Noted: 2017-10-19 | Resolved: 2022-03-02

## 2022-06-10 NOTE — PROGRESS NOTES
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  Given the location of the defect, shape of the defect and the proximity to free margins a staged advancement flap was deemed most appropriate.  Using a sterile surgical marker, an appropriate advancement flap was drawn incorporating the defect and placing the expected incisions within the relaxed skin tension lines where possible. The area thus outlined was incised deep to adipose tissue with a #15 scalpel blade.  The skin margins were undermined to an appropriate distance in all directions utilizing iris scissors.

## 2023-07-22 ENCOUNTER — HOSPITAL ENCOUNTER (INPATIENT)
Facility: HOSPITAL | Age: 68
LOS: 6 days | Discharge: HOME | DRG: 025 | End: 2023-07-28
Attending: EMERGENCY MEDICINE | Admitting: HOSPITALIST
Payer: MEDICARE

## 2023-07-22 ENCOUNTER — APPOINTMENT (OUTPATIENT)
Dept: MRI IMAGING | Facility: HOSPITAL | Age: 68
DRG: 025 | End: 2023-07-22
Attending: EMERGENCY MEDICINE
Payer: MEDICARE

## 2023-07-22 DIAGNOSIS — G93.6 VASOGENIC EDEMA (HCC): ICD-10-CM

## 2023-07-22 DIAGNOSIS — G93.89 BRAIN MASS: Primary | ICD-10-CM

## 2023-07-22 LAB
ANION GAP SERPL CALC-SCNC: 5 MMOL/L (ref 0–18)
BASOPHILS # BLD AUTO: 0.03 X10(3) UL (ref 0–0.2)
BASOPHILS NFR BLD AUTO: 0.5 %
BUN BLD-MCNC: 13 MG/DL (ref 7–18)
BUN/CREAT SERPL: 19.7 (ref 10–20)
CALCIUM BLD-MCNC: 8.7 MG/DL (ref 8.5–10.1)
CHLORIDE SERPL-SCNC: 112 MMOL/L (ref 98–112)
CO2 SERPL-SCNC: 27 MMOL/L (ref 21–32)
CREAT BLD-MCNC: 0.66 MG/DL
DEPRECATED RDW RBC AUTO: 44.4 FL (ref 35.1–46.3)
EGFRCR SERPLBLD CKD-EPI 2021: 97 ML/MIN/1.73M2 (ref 60–?)
EOSINOPHIL # BLD AUTO: 0.02 X10(3) UL (ref 0–0.7)
EOSINOPHIL NFR BLD AUTO: 0.3 %
ERYTHROCYTE [DISTWIDTH] IN BLOOD BY AUTOMATED COUNT: 15.1 % (ref 11–15)
GLUCOSE BLD-MCNC: 105 MG/DL (ref 70–99)
HCT VFR BLD AUTO: 38.1 %
HGB BLD-MCNC: 11.6 G/DL
IMM GRANULOCYTES # BLD AUTO: 0.01 X10(3) UL (ref 0–1)
IMM GRANULOCYTES NFR BLD: 0.2 %
LYMPHOCYTES # BLD AUTO: 1.57 X10(3) UL (ref 1–4)
LYMPHOCYTES NFR BLD AUTO: 27.2 %
MCH RBC QN AUTO: 24.6 PG (ref 26–34)
MCHC RBC AUTO-ENTMCNC: 30.4 G/DL (ref 31–37)
MCV RBC AUTO: 80.7 FL
MONOCYTES # BLD AUTO: 0.48 X10(3) UL (ref 0.1–1)
MONOCYTES NFR BLD AUTO: 8.3 %
NEUTROPHILS # BLD AUTO: 3.67 X10 (3) UL (ref 1.5–7.7)
NEUTROPHILS # BLD AUTO: 3.67 X10(3) UL (ref 1.5–7.7)
NEUTROPHILS NFR BLD AUTO: 63.5 %
OSMOLALITY SERPL CALC.SUM OF ELEC: 298 MOSM/KG (ref 275–295)
PLATELET # BLD AUTO: 250 10(3)UL (ref 150–450)
POTASSIUM SERPL-SCNC: 3.4 MMOL/L (ref 3.5–5.1)
RBC # BLD AUTO: 4.72 X10(6)UL
SARS-COV-2 RNA RESP QL NAA+PROBE: NOT DETECTED
SODIUM SERPL-SCNC: 144 MMOL/L (ref 136–145)
WBC # BLD AUTO: 5.8 X10(3) UL (ref 4–11)

## 2023-07-22 PROCEDURE — 70551 MRI BRAIN STEM W/O DYE: CPT | Performed by: EMERGENCY MEDICINE

## 2023-07-22 RX ORDER — DEXAMETHASONE SODIUM PHOSPHATE 10 MG/ML
10 INJECTION, SOLUTION INTRAMUSCULAR; INTRAVENOUS ONCE
Status: COMPLETED | OUTPATIENT
Start: 2023-07-22 | End: 2023-07-22

## 2023-07-22 RX ORDER — METOCLOPRAMIDE HYDROCHLORIDE 5 MG/ML
10 INJECTION INTRAMUSCULAR; INTRAVENOUS EVERY 8 HOURS PRN
Status: DISCONTINUED | OUTPATIENT
Start: 2023-07-22 | End: 2023-07-28

## 2023-07-22 RX ORDER — POTASSIUM CHLORIDE 20 MEQ/1
40 TABLET, EXTENDED RELEASE ORAL ONCE
Status: COMPLETED | OUTPATIENT
Start: 2023-07-22 | End: 2023-07-22

## 2023-07-22 RX ORDER — ACETAMINOPHEN 500 MG
1000 TABLET ORAL ONCE
Status: DISCONTINUED | OUTPATIENT
Start: 2023-07-22 | End: 2023-07-22

## 2023-07-22 RX ORDER — METOCLOPRAMIDE HYDROCHLORIDE 5 MG/ML
10 INJECTION INTRAMUSCULAR; INTRAVENOUS ONCE
Status: DISCONTINUED | OUTPATIENT
Start: 2023-07-22 | End: 2023-07-22

## 2023-07-22 RX ORDER — BISACODYL 10 MG
10 SUPPOSITORY, RECTAL RECTAL
Status: DISCONTINUED | OUTPATIENT
Start: 2023-07-22 | End: 2023-07-28

## 2023-07-22 RX ORDER — DIPHENHYDRAMINE HYDROCHLORIDE 50 MG/ML
25 INJECTION INTRAMUSCULAR; INTRAVENOUS ONCE
Status: DISCONTINUED | OUTPATIENT
Start: 2023-07-22 | End: 2023-07-22

## 2023-07-22 RX ORDER — SENNOSIDES 8.6 MG
17.2 TABLET ORAL NIGHTLY PRN
Status: DISCONTINUED | OUTPATIENT
Start: 2023-07-22 | End: 2023-07-28

## 2023-07-22 RX ORDER — ONDANSETRON 2 MG/ML
4 INJECTION INTRAMUSCULAR; INTRAVENOUS EVERY 6 HOURS PRN
Status: DISCONTINUED | OUTPATIENT
Start: 2023-07-22 | End: 2023-07-27

## 2023-07-22 RX ORDER — POLYETHYLENE GLYCOL 3350 17 G/17G
17 POWDER, FOR SOLUTION ORAL DAILY PRN
Status: DISCONTINUED | OUTPATIENT
Start: 2023-07-22 | End: 2023-07-27

## 2023-07-22 RX ORDER — ACETAMINOPHEN 500 MG
500 TABLET ORAL EVERY 4 HOURS PRN
Status: DISCONTINUED | OUTPATIENT
Start: 2023-07-22 | End: 2023-07-28

## 2023-07-22 RX ORDER — DEXAMETHASONE SODIUM PHOSPHATE 4 MG/ML
4 VIAL (ML) INJECTION EVERY 6 HOURS
Status: DISCONTINUED | OUTPATIENT
Start: 2023-07-22 | End: 2023-07-23

## 2023-07-22 NOTE — ED QUICK NOTES
Orders for admission, patient is aware of plan and ready to go upstairs. Any questions, please call ED RN Ariela Saeed at extension [phone].      Patient Covid vaccination status: Fully vaccinated     COVID Test Ordered in ED: None    COVID Suspicion at Admission: N/A    Running Infusions:      Mental Status/LOC at time of transport: A&Ox4    Other pertinent information:  Continent, ambulatory     CIWA score: N/A   NIH score:  N/A

## 2023-07-22 NOTE — ED INITIAL ASSESSMENT (HPI)
Pt to the ed for complaints of migraine headache x 3 days   Also reports nausea, photosensitivity, and difficulty with visual focus

## 2023-07-23 ENCOUNTER — APPOINTMENT (OUTPATIENT)
Dept: CT IMAGING | Facility: HOSPITAL | Age: 68
DRG: 025 | End: 2023-07-23
Attending: HOSPITALIST
Payer: MEDICARE

## 2023-07-23 PROBLEM — G93.5 BRAIN COMPRESSION (HCC): Status: ACTIVE | Noted: 2023-07-23

## 2023-07-23 PROBLEM — R90.89 MIDLINE SHIFT OF BRAIN: Status: ACTIVE | Noted: 2023-07-23

## 2023-07-23 LAB
ANION GAP SERPL CALC-SCNC: 7 MMOL/L (ref 0–18)
BASOPHILS # BLD AUTO: 0 X10(3) UL (ref 0–0.2)
BASOPHILS NFR BLD AUTO: 0 %
BUN BLD-MCNC: 17 MG/DL (ref 7–18)
BUN/CREAT SERPL: 27.4 (ref 10–20)
CALCIUM BLD-MCNC: 9.2 MG/DL (ref 8.5–10.1)
CHLORIDE SERPL-SCNC: 112 MMOL/L (ref 98–112)
CO2 SERPL-SCNC: 26 MMOL/L (ref 21–32)
CREAT BLD-MCNC: 0.62 MG/DL
DEPRECATED RDW RBC AUTO: 43.2 FL (ref 35.1–46.3)
EGFRCR SERPLBLD CKD-EPI 2021: 98 ML/MIN/1.73M2 (ref 60–?)
EOSINOPHIL # BLD AUTO: 0 X10(3) UL (ref 0–0.7)
EOSINOPHIL NFR BLD AUTO: 0 %
ERYTHROCYTE [DISTWIDTH] IN BLOOD BY AUTOMATED COUNT: 15 % (ref 11–15)
GLUCOSE BLD-MCNC: 140 MG/DL (ref 70–99)
HCT VFR BLD AUTO: 34.3 %
HGB BLD-MCNC: 10.9 G/DL
IMM GRANULOCYTES # BLD AUTO: 0.02 X10(3) UL (ref 0–1)
IMM GRANULOCYTES NFR BLD: 0.3 %
LYMPHOCYTES # BLD AUTO: 1.07 X10(3) UL (ref 1–4)
LYMPHOCYTES NFR BLD AUTO: 17.3 %
MCH RBC QN AUTO: 25.1 PG (ref 26–34)
MCHC RBC AUTO-ENTMCNC: 31.8 G/DL (ref 31–37)
MCV RBC AUTO: 78.9 FL
MONOCYTES # BLD AUTO: 0.2 X10(3) UL (ref 0.1–1)
MONOCYTES NFR BLD AUTO: 3.2 %
MRSA DNA SPEC QL NAA+PROBE: NEGATIVE
NEUTROPHILS # BLD AUTO: 4.88 X10 (3) UL (ref 1.5–7.7)
NEUTROPHILS # BLD AUTO: 4.88 X10(3) UL (ref 1.5–7.7)
NEUTROPHILS NFR BLD AUTO: 79.2 %
OSMOLALITY SERPL CALC.SUM OF ELEC: 304 MOSM/KG (ref 275–295)
PLATELET # BLD AUTO: 236 10(3)UL (ref 150–450)
POTASSIUM SERPL-SCNC: 4.2 MMOL/L (ref 3.5–5.1)
POTASSIUM SERPL-SCNC: 4.2 MMOL/L (ref 3.5–5.1)
RBC # BLD AUTO: 4.35 X10(6)UL
SODIUM SERPL-SCNC: 145 MMOL/L (ref 136–145)
WBC # BLD AUTO: 6.2 X10(3) UL (ref 4–11)

## 2023-07-23 PROCEDURE — 74177 CT ABD & PELVIS W/CONTRAST: CPT | Performed by: HOSPITALIST

## 2023-07-23 PROCEDURE — 71260 CT THORAX DX C+: CPT | Performed by: HOSPITALIST

## 2023-07-23 PROCEDURE — 99223 1ST HOSP IP/OBS HIGH 75: CPT | Performed by: STUDENT IN AN ORGANIZED HEALTH CARE EDUCATION/TRAINING PROGRAM

## 2023-07-23 RX ORDER — PANTOPRAZOLE SODIUM 40 MG/1
40 TABLET, DELAYED RELEASE ORAL
Status: DISCONTINUED | OUTPATIENT
Start: 2023-07-24 | End: 2023-07-28

## 2023-07-23 RX ORDER — DEXAMETHASONE SODIUM PHOSPHATE 4 MG/ML
4 VIAL (ML) INJECTION EVERY 12 HOURS
Status: DISCONTINUED | OUTPATIENT
Start: 2023-07-23 | End: 2023-07-28

## 2023-07-23 NOTE — CONSULTS
[facility]  Neurological Surgery Consult Note    Quang Kelly  6/15/1955  [id number]  PCP: Franky Piper DO  Consulting Provider: Margret Coughlin MD    REASON FOR CONSULT:  Multifocal intracranial masses     HISTORY OF PRESENT ILLNESS:  Quang Kelly is a(n) 79year old female with history of gastric bypass who presented with a week 1 week history of worsening vision impaired memory. She reports being current on her age-appropriate malignancy screening. Of note, two of her sisters have breast cancer. Reports that approximately 1 week ago she started noticing some memory trouble as well as difficulty with seeing objects to her right side. She had some mild headaches but nothing significant. However given persistence of the symptoms she finally decided to go to seek medical attention. An MRI performed while in the ER demonstrated multiple intracranial masses, for which neurosurgery was consulted.     PAST MEDICAL HISTORY:  Past Medical History:   Diagnosis Date    Abnormality of gait 2017    BRCA1 negative 2018    BRCA2 negative     Decreased range of motion of left knee 2018    Decreased strength of lower extremity 9/10/2019    Infected prosthetic knee joint      Muscle weakness     Osteoarthritis     PONV (postoperative nausea and vomiting)     profound nausea and vomiting    Pre-op exam 10/19/2017    Visual impairment     glasses     PAST SURGICAL HISTORY:  Past Surgical History:   Procedure Laterality Date    BENIGN BIOPSY LEFT  2018    stereotactic biopsy, benign          GASTRIC BYPASS,OBESE<100CM [id number]-EN-Y      HYSTERECTOMY      Ovaries removed as well - age 45    HYSTEROSCOPY      [facility] JT+ANESTHESIA Right 2017    Dr Marie Ohfrancesco JT+ANESTHESIA Left 2018    Dr Vipul Winslow Left 2017    Dr Villagomez Mom HISTORY:  family history includes Breast Cancer in her maternal grandmother; Breast Cancer (age of onset: 27) in her sister; Breast Cancer (age of onset: 35) in her sister; Cancer in her sister; Diabetes in her brother; Hypertension in her father. SOCIAL HISTORY:   reports that she has never smoked. She has never used smokeless tobacco. She reports that she does not currently use alcohol. She reports that she does not use drugs. ALLERGIES:    Nickel                  HIVES    MEDICATIONS:  No current facility-administered medications on file prior to encounter. Multiple Vitamins-Minerals (MULTIVITAMIN ADULT OR), Take by mouth., Disp: , Rfl:   Ergocalciferol (VITAMIN D OR), Take by mouth., Disp: , Rfl:       REVIEW OF SYSTEMS:  All other systems were reviewed and were negative except for those previously mentioned in the HPI    PHYSICAL EXAMINATION:  Physical Exam:    General: No acute distress. Respiratory: Non-labored respirations bilaterally. No audible wheezing  Cardiovascular: Extremities warm and well-perfused. Abdomen: Soft, nontender, nondistended. Musculoskeletal: Moves all extremities well, symmetrically. Extremities: No edema. Neurologic:  A&Ox3   PERRL, EOMI   Right homonymous hemianopsia  Face symmetric, no numbness   5/5 throughout, no drift   Sensation intact to light touch    IMAGING:  MR brain w/o: 7/22/2023: Multifocal lesions involving the left parietal occipital lobes with associated surrounding vasogenic edema producing approximately 9 mm of rightward shift. No acute strokes or hemorrhage. ASSESSMENT:  79 yoRH female with newly diagnosed multifocal intracranial masses. Imaging most consistent with metastatic lesions. However, awaiting MRI with contrast as well as metastatic work-up. She is neurologically stable. Recommend decreasing dose of dexamethasone to 4 mg twice daily. Otherwise, further neurosurgical recommendations to follow completion of the above work-up.     Plan:  - Metastatic work up with CT c/a/p  - MRI brain w/ and w/o  - Will provide further recommendations pending above    Pham Harrison MD  Neurological Surgery    [facility] [address] [place] [address]  [phone]  Pager [phone]  7/23/2023 4:28 PM      This note was created using a voice-recognition transcribing system. Incorrect words or phrases may have been missed during proofreading. Please interpret accordingly.

## 2023-07-24 ENCOUNTER — APPOINTMENT (OUTPATIENT)
Dept: MRI IMAGING | Facility: HOSPITAL | Age: 68
DRG: 025 | End: 2023-07-24
Attending: HOSPITALIST
Payer: MEDICARE

## 2023-07-24 PROBLEM — H53.461 RIGHT HOMONYMOUS HEMIANOPSIA: Status: ACTIVE | Noted: 2023-07-24

## 2023-07-24 LAB
ANION GAP SERPL CALC-SCNC: 7 MMOL/L (ref 0–18)
BASOPHILS # BLD AUTO: 0.01 X10(3) UL (ref 0–0.2)
BASOPHILS NFR BLD AUTO: 0.1 %
BUN BLD-MCNC: 21 MG/DL (ref 7–18)
BUN/CREAT SERPL: 28 (ref 10–20)
CALCIUM BLD-MCNC: 8.5 MG/DL (ref 8.5–10.1)
CHLORIDE SERPL-SCNC: 110 MMOL/L (ref 98–112)
CO2 SERPL-SCNC: 28 MMOL/L (ref 21–32)
CREAT BLD-MCNC: 0.75 MG/DL
DEPRECATED RDW RBC AUTO: 43.8 FL (ref 35.1–46.3)
EGFRCR SERPLBLD CKD-EPI 2021: 88 ML/MIN/1.73M2 (ref 60–?)
EOSINOPHIL # BLD AUTO: 0 X10(3) UL (ref 0–0.7)
EOSINOPHIL NFR BLD AUTO: 0 %
ERYTHROCYTE [DISTWIDTH] IN BLOOD BY AUTOMATED COUNT: 15.1 % (ref 11–15)
GLUCOSE BLD-MCNC: 135 MG/DL (ref 70–99)
HCT VFR BLD AUTO: 34 %
HGB BLD-MCNC: 10.8 G/DL
IMM GRANULOCYTES # BLD AUTO: 0.02 X10(3) UL (ref 0–1)
IMM GRANULOCYTES NFR BLD: 0.2 %
LYMPHOCYTES # BLD AUTO: 1.13 X10(3) UL (ref 1–4)
LYMPHOCYTES NFR BLD AUTO: 13.5 %
MCH RBC QN AUTO: 25.6 PG (ref 26–34)
MCHC RBC AUTO-ENTMCNC: 31.8 G/DL (ref 31–37)
MCV RBC AUTO: 80.6 FL
MONOCYTES # BLD AUTO: 0.5 X10(3) UL (ref 0.1–1)
MONOCYTES NFR BLD AUTO: 6 %
NEUTROPHILS # BLD AUTO: 6.73 X10 (3) UL (ref 1.5–7.7)
NEUTROPHILS # BLD AUTO: 6.73 X10(3) UL (ref 1.5–7.7)
NEUTROPHILS NFR BLD AUTO: 80.2 %
OSMOLALITY SERPL CALC.SUM OF ELEC: 305 MOSM/KG (ref 275–295)
PLATELET # BLD AUTO: 253 10(3)UL (ref 150–450)
POTASSIUM SERPL-SCNC: 3.9 MMOL/L (ref 3.5–5.1)
RBC # BLD AUTO: 4.22 X10(6)UL
SODIUM SERPL-SCNC: 145 MMOL/L (ref 136–145)
WBC # BLD AUTO: 8.4 X10(3) UL (ref 4–11)

## 2023-07-24 PROCEDURE — 99232 SBSQ HOSP IP/OBS MODERATE 35: CPT | Performed by: STUDENT IN AN ORGANIZED HEALTH CARE EDUCATION/TRAINING PROGRAM

## 2023-07-24 PROCEDURE — 70553 MRI BRAIN STEM W/O & W/DYE: CPT | Performed by: HOSPITALIST

## 2023-07-24 RX ORDER — GADOTERATE MEGLUMINE 376.9 MG/ML
15 INJECTION INTRAVENOUS
Status: COMPLETED | OUTPATIENT
Start: 2023-07-24 | End: 2023-07-24

## 2023-07-24 NOTE — PLAN OF CARE
Patient is awake/alert ox4; speech clear, oob with steady gait and her only issue this am was attempting to txt having some difficulty finding the letters and after she brushed teeth could not re cap the paste. Patient stated at home never had these problems, denies headaches needs reminders when using call light. Problem: CARDIOVASCULAR - ADULT  Goal: Maintains optimal cardiac output and hemodynamic stability  Description: INTERVENTIONS:  - Monitor vital signs, rhythm, and trends  - Monitor for bleeding, hypotension and signs of decreased cardiac output  - Evaluate effectiveness of vasoactive medications to optimize hemodynamic stability  - Monitor arterial and/or venous puncture sites for bleeding and/or hematoma  - Assess quality of pulses, skin color and temperature  - Assess for signs of decreased coronary artery perfusion - ex.  Angina  - Evaluate fluid balance, assess for edema, trend weights  Outcome: Progressing     Problem: RESPIRATORY - ADULT  Goal: Achieves optimal ventilation and oxygenation  Description: INTERVENTIONS:  - Assess for changes in respiratory status  - Assess for changes in mentation and behavior  - Position to facilitate oxygenation and minimize respiratory effort  - Oxygen supplementation based on oxygen saturation or ABGs  - Provide Smoking Cessation handout, if applicable  - Encourage broncho-pulmonary hygiene including cough, deep breathe, Incentive Spirometry  - Assess the need for suctioning and perform as needed  - Assess and instruct to report SOB or any respiratory difficulty  - Respiratory Therapy support as indicated  - Manage/alleviate anxiety  - Monitor for signs/symptoms of CO2 retention  Outcome: Progressing     Problem: SKIN/TISSUE INTEGRITY - ADULT  Goal: Skin integrity remains intact  Description: INTERVENTIONS  - Assess and document risk factors for pressure ulcer development  - Assess and document skin integrity  - Monitor for areas of redness and/or skin breakdown  - Initiate interventions, skin care algorithm/standards of care as needed  Outcome: Progressing     Problem: NEUROLOGICAL - ADULT  Goal: Achieves stable or improved neurological status  Description: INTERVENTIONS  - Assess for and report changes in neurological status  - Initiate measures to prevent increased intracranial pressure  - Maintain blood pressure and fluid volume within ordered parameters to optimize cerebral perfusion and minimize risk of hemorrhage  - Monitor temperature, glucose, and sodium.  Initiate appropriate interventions as ordered  Outcome: Progressing  Goal: Achieves maximal functionality and self care  Description: INTERVENTIONS  - Monitor swallowing and airway patency with patient fatigue and changes in neurological status  - Encourage and assist patient to increase activity and self care with guidance from PT/OT  - Encourage visually impaired, hearing impaired and aphasic patients to use assistive/communication devices  Outcome: Not Progressing

## 2023-07-25 PROCEDURE — 99233 SBSQ HOSP IP/OBS HIGH 50: CPT | Performed by: STUDENT IN AN ORGANIZED HEALTH CARE EDUCATION/TRAINING PROGRAM

## 2023-07-25 NOTE — PROGRESS NOTES
Trasferred pt to room 230 with pt belongings. No pain or discomfort noted. Endorsed to [facility]. No acute events overnight.

## 2023-07-25 NOTE — PLAN OF CARE
Problem: CARDIOVASCULAR - ADULT  Goal: Maintains optimal cardiac output and hemodynamic stability  Description: INTERVENTIONS:  - Monitor vital signs, rhythm, and trends  - Monitor for bleeding, hypotension and signs of decreased cardiac output  - Evaluate effectiveness of vasoactive medications to optimize hemodynamic stability  - Monitor arterial and/or venous puncture sites for bleeding and/or hematoma  - Assess quality of pulses, skin color and temperature  - Assess for signs of decreased coronary artery perfusion - ex.  Angina  - Evaluate fluid balance, assess for edema, trend weights  Outcome: Progressing     Problem: RESPIRATORY - ADULT  Goal: Achieves optimal ventilation and oxygenation  Description: INTERVENTIONS:  - Assess for changes in respiratory status  - Assess for changes in mentation and behavior  - Position to facilitate oxygenation and minimize respiratory effort  - Oxygen supplementation based on oxygen saturation or ABGs  - Provide Smoking Cessation handout, if applicable  - Encourage broncho-pulmonary hygiene including cough, deep breathe, Incentive Spirometry  - Assess the need for suctioning and perform as needed  - Assess and instruct to report SOB or any respiratory difficulty  - Respiratory Therapy support as indicated  - Manage/alleviate anxiety  - Monitor for signs/symptoms of CO2 retention  Outcome: Progressing     Problem: SKIN/TISSUE INTEGRITY - ADULT  Goal: Skin integrity remains intact  Description: INTERVENTIONS  - Assess and document risk factors for pressure ulcer development  - Assess and document skin integrity  - Monitor for areas of redness and/or skin breakdown  - Initiate interventions, skin care algorithm/standards of care as needed  Outcome: Progressing

## 2023-07-25 NOTE — PLAN OF CARE
Problem: Patient Centered Care  Goal: Patient preferences are identified and integrated in the patient's plan of care  Description: Interventions:  - What would you like us to know as we care for you? \"I want to know what the next steps are\"  - Provide timely, complete, and accurate information to patient/family  - Incorporate patient and family knowledge, values, beliefs, and cultural backgrounds into the planning and delivery of care  - Encourage patient/family to participate in care and decision-making at the level they choose  - Honor patient and family perspectives and choices  Outcome: Progressing     Problem: CARDIOVASCULAR - ADULT  Goal: Maintains optimal cardiac output and hemodynamic stability  Description: INTERVENTIONS:  - Monitor vital signs, rhythm, and trends  - Monitor for bleeding, hypotension and signs of decreased cardiac output  - Evaluate effectiveness of vasoactive medications to optimize hemodynamic stability  - Monitor arterial and/or venous puncture sites for bleeding and/or hematoma  - Assess quality of pulses, skin color and temperature  - Assess for signs of decreased coronary artery perfusion - ex.  Angina  - Evaluate fluid balance, assess for edema, trend weights  Outcome: Progressing     Problem: RESPIRATORY - ADULT  Goal: Achieves optimal ventilation and oxygenation  Description: INTERVENTIONS:  - Assess for changes in respiratory status  - Assess for changes in mentation and behavior  - Position to facilitate oxygenation and minimize respiratory effort  - Oxygen supplementation based on oxygen saturation or ABGs  - Provide Smoking Cessation handout, if applicable  - Encourage broncho-pulmonary hygiene including cough, deep breathe, Incentive Spirometry  - Assess the need for suctioning and perform as needed  - Assess and instruct to report SOB or any respiratory difficulty  - Respiratory Therapy support as indicated  - Manage/alleviate anxiety  - Monitor for signs/symptoms of CO2 retention  Outcome: Progressing     Problem: SKIN/TISSUE INTEGRITY - ADULT  Goal: Skin integrity remains intact  Description: INTERVENTIONS  - Assess and document risk factors for pressure ulcer development  - Assess and document skin integrity  - Monitor for areas of redness and/or skin breakdown  - Initiate interventions, skin care algorithm/standards of care as needed  Outcome: Progressing     Problem: NEUROLOGICAL - ADULT  Goal: Achieves stable or improved neurological status  Description: INTERVENTIONS  - Assess for and report changes in neurological status  - Initiate measures to prevent increased intracranial pressure  - Maintain blood pressure and fluid volume within ordered parameters to optimize cerebral perfusion and minimize risk of hemorrhage  - Monitor temperature, glucose, and sodium.  Initiate appropriate interventions as ordered  Outcome: Progressing  Goal: Achieves maximal functionality and self care  Description: INTERVENTIONS  - Monitor swallowing and airway patency with patient fatigue and changes in neurological status  - Encourage and assist patient to increase activity and self care with guidance from PT/OT  - Encourage visually impaired, hearing impaired and aphasic patients to use assistive/communication devices  Outcome: Progressing

## 2023-07-25 NOTE — PROGRESS NOTES
Assumed care of patient at this time, transferred from 213 to 230 in stable condition with all personal belongings.

## 2023-07-25 NOTE — PLAN OF CARE
Problem: Patient Centered Care  Goal: Patient preferences are identified and integrated in the patient's plan of care  Description: Interventions:  - Provide timely, complete, and accurate information to patient/family  - Incorporate patient and family knowledge, values, beliefs, and cultural backgrounds into the planning and delivery of care  - Encourage patient/family to participate in care and decision-making at the level they choose  - Honor patient and family perspectives and choices  Outcome: Progressing     Problem: CARDIOVASCULAR - ADULT  Goal: Maintains optimal cardiac output and hemodynamic stability  Description: INTERVENTIONS:  - Monitor vital signs, rhythm, and trends  - Monitor for bleeding, hypotension and signs of decreased cardiac output  - Evaluate effectiveness of vasoactive medications to optimize hemodynamic stability  - Monitor arterial and/or venous puncture sites for bleeding and/or hematoma  - Assess quality of pulses, skin color and temperature  - Assess for signs of decreased coronary artery perfusion - ex.  Angina  - Evaluate fluid balance, assess for edema, trend weights  Outcome: Progressing     Problem: RESPIRATORY - ADULT  Goal: Achieves optimal ventilation and oxygenation  Description: INTERVENTIONS:  - Assess for changes in respiratory status  - Assess for changes in mentation and behavior  - Position to facilitate oxygenation and minimize respiratory effort  - Oxygen supplementation based on oxygen saturation or ABGs  - Provide Smoking Cessation handout, if applicable  - Encourage broncho-pulmonary hygiene including cough, deep breathe, Incentive Spirometry  - Assess the need for suctioning and perform as needed  - Assess and instruct to report SOB or any respiratory difficulty  - Respiratory Therapy support as indicated  - Manage/alleviate anxiety  - Monitor for signs/symptoms of CO2 retention  Outcome: Progressing     Problem: SKIN/TISSUE INTEGRITY - ADULT  Goal: Skin integrity remains intact  Description: INTERVENTIONS  - Assess and document risk factors for pressure ulcer development  - Assess and document skin integrity  - Monitor for areas of redness and/or skin breakdown  - Initiate interventions, skin care algorithm/standards of care as needed  Outcome: Progressing     Problem: NEUROLOGICAL - ADULT  Goal: Achieves stable or improved neurological status  Description: INTERVENTIONS  - Assess for and report changes in neurological status  - Initiate measures to prevent increased intracranial pressure  - Maintain blood pressure and fluid volume within ordered parameters to optimize cerebral perfusion and minimize risk of hemorrhage  - Monitor temperature, glucose, and sodium.  Initiate appropriate interventions as ordered  Outcome: Progressing  Goal: Achieves maximal functionality and self care  Description: INTERVENTIONS  - Monitor swallowing and airway patency with patient fatigue and changes in neurological status  - Encourage and assist patient to increase activity and self care with guidance from PT/OT  - Encourage visually impaired, hearing impaired and aphasic patients to use assistive/communication devices  Outcome: Progressing

## 2023-07-26 LAB
ANTIBODY SCREEN: NEGATIVE
APTT PPP: 25.4 SECONDS (ref 23.3–35.6)
FOLATE SERPL-MCNC: 7 NG/ML (ref 8.7–?)
INR BLD: 1.08 (ref 0.85–1.16)
IRON SATN MFR SERPL: 8 %
IRON SERPL-MCNC: 35 UG/DL
PROTHROMBIN TIME: 13.9 SECONDS (ref 11.6–14.8)
RH BLOOD TYPE: POSITIVE
TIBC SERPL-MCNC: 420 UG/DL (ref 240–450)
TRANSFERRIN SERPL-MCNC: 282 MG/DL (ref 200–360)
VIT B12 SERPL-MCNC: >2000 PG/ML (ref 193–986)

## 2023-07-26 NOTE — PLAN OF CARE
Lillie Keith is resting comfortably in the chair, alert and oriented x 3, on room air, NSR on tele, SCDs for DVT prophylaxis, independent, ambulating with SBA due to vision changes, on IV Decadron, plan for left side stereotactic biopsy tomorrow per MD orders, NPO at midnight, no complaints of pain at this time, safety precautions in place, call light within easy reach. Problem: Patient Centered Care  Goal: Patient preferences are identified and integrated in the patient's plan of care  Description: Interventions:  - What would you like us to know as we care for you? Everything that is going on with my brain is all new to me. - Provide timely, complete, and accurate information to patient/family  - Incorporate patient and family knowledge, values, beliefs, and cultural backgrounds into the planning and delivery of care  - Encourage patient/family to participate in care and decision-making at the level they choose  - Honor patient and family perspectives and choices  Outcome: Progressing     Problem: CARDIOVASCULAR - ADULT  Goal: Maintains optimal cardiac output and hemodynamic stability  Description: INTERVENTIONS:  - Monitor vital signs, rhythm, and trends  - Monitor for bleeding, hypotension and signs of decreased cardiac output  - Evaluate effectiveness of vasoactive medications to optimize hemodynamic stability  - Monitor arterial and/or venous puncture sites for bleeding and/or hematoma  - Assess quality of pulses, skin color and temperature  - Assess for signs of decreased coronary artery perfusion - ex.  Angina  - Evaluate fluid balance, assess for edema, trend weights  Outcome: Progressing     Problem: RESPIRATORY - ADULT  Goal: Achieves optimal ventilation and oxygenation  Description: INTERVENTIONS:  - Assess for changes in respiratory status  - Assess for changes in mentation and behavior  - Position to facilitate oxygenation and minimize respiratory effort  - Oxygen supplementation based on oxygen saturation or ABGs  - Provide Smoking Cessation handout, if applicable  - Encourage broncho-pulmonary hygiene including cough, deep breathe, Incentive Spirometry  - Assess the need for suctioning and perform as needed  - Assess and instruct to report SOB or any respiratory difficulty  - Respiratory Therapy support as indicated  - Manage/alleviate anxiety  - Monitor for signs/symptoms of CO2 retention  Outcome: Progressing     Problem: SKIN/TISSUE INTEGRITY - ADULT  Goal: Skin integrity remains intact  Description: INTERVENTIONS  - Assess and document risk factors for pressure ulcer development  - Assess and document skin integrity  - Monitor for areas of redness and/or skin breakdown  - Initiate interventions, skin care algorithm/standards of care as needed  Outcome: Progressing     Problem: NEUROLOGICAL - ADULT  Goal: Achieves stable or improved neurological status  Description: INTERVENTIONS  - Assess for and report changes in neurological status  - Initiate measures to prevent increased intracranial pressure  - Maintain blood pressure and fluid volume within ordered parameters to optimize cerebral perfusion and minimize risk of hemorrhage  - Monitor temperature, glucose, and sodium.  Initiate appropriate interventions as ordered  Outcome: Progressing  Goal: Achieves maximal functionality and self care  Description: INTERVENTIONS  - Monitor swallowing and airway patency with patient fatigue and changes in neurological status  - Encourage and assist patient to increase activity and self care with guidance from PT/OT  - Encourage visually impaired, hearing impaired and aphasic patients to use assistive/communication devices  Outcome: Progressing 2 = assistive person

## 2023-07-26 NOTE — PLAN OF CARE
Pt is alert & oriented x4. Currently on room air. No c/o pain throughout the night. Safety precautions in place, call light within reach, bed is locked and in lowest position. Problem: Patient Centered Care  Goal: Patient preferences are identified and integrated in the patient's plan of care  Description: Interventions:  - What would you like us to know as we care for you?   - Provide timely, complete, and accurate information to patient/family  - Incorporate patient and family knowledge, values, beliefs, and cultural backgrounds into the planning and delivery of care  - Encourage patient/family to participate in care and decision-making at the level they choose  - Honor patient and family perspectives and choices  Outcome: Progressing     Problem: CARDIOVASCULAR - ADULT  Goal: Maintains optimal cardiac output and hemodynamic stability  Description: INTERVENTIONS:  - Monitor vital signs, rhythm, and trends  - Monitor for bleeding, hypotension and signs of decreased cardiac output  - Evaluate effectiveness of vasoactive medications to optimize hemodynamic stability  - Monitor arterial and/or venous puncture sites for bleeding and/or hematoma  - Assess quality of pulses, skin color and temperature  - Assess for signs of decreased coronary artery perfusion - ex.  Angina  - Evaluate fluid balance, assess for edema, trend weights  Outcome: Progressing     Problem: RESPIRATORY - ADULT  Goal: Achieves optimal ventilation and oxygenation  Description: INTERVENTIONS:  - Assess for changes in respiratory status  - Assess for changes in mentation and behavior  - Position to facilitate oxygenation and minimize respiratory effort  - Oxygen supplementation based on oxygen saturation or ABGs  - Provide Smoking Cessation handout, if applicable  - Encourage broncho-pulmonary hygiene including cough, deep breathe, Incentive Spirometry  - Assess the need for suctioning and perform as needed  - Assess and instruct to report SOB or any respiratory difficulty  - Respiratory Therapy support as indicated  - Manage/alleviate anxiety  - Monitor for signs/symptoms of CO2 retention  Outcome: Progressing     Problem: SKIN/TISSUE INTEGRITY - ADULT  Goal: Skin integrity remains intact  Description: INTERVENTIONS  - Assess and document risk factors for pressure ulcer development  - Assess and document skin integrity  - Monitor for areas of redness and/or skin breakdown  - Initiate interventions, skin care algorithm/standards of care as needed  Outcome: Progressing     Problem: NEUROLOGICAL - ADULT  Goal: Achieves stable or improved neurological status  Description: INTERVENTIONS  - Assess for and report changes in neurological status  - Initiate measures to prevent increased intracranial pressure  - Maintain blood pressure and fluid volume within ordered parameters to optimize cerebral perfusion and minimize risk of hemorrhage  - Monitor temperature, glucose, and sodium.  Initiate appropriate interventions as ordered  Outcome: Progressing  Goal: Achieves maximal functionality and self care  Description: INTERVENTIONS  - Monitor swallowing and airway patency with patient fatigue and changes in neurological status  - Encourage and assist patient to increase activity and self care with guidance from PT/OT  - Encourage visually impaired, hearing impaired and aphasic patients to use assistive/communication devices  Outcome: Progressing

## 2023-07-27 ENCOUNTER — ANESTHESIA (OUTPATIENT)
Dept: SURGERY | Facility: HOSPITAL | Age: 68
DRG: 025 | End: 2023-07-27
Payer: MEDICARE

## 2023-07-27 ENCOUNTER — ANESTHESIA EVENT (OUTPATIENT)
Dept: SURGERY | Facility: HOSPITAL | Age: 68
DRG: 025 | End: 2023-07-27
Payer: MEDICARE

## 2023-07-27 PROCEDURE — 00B73ZX EXCISION OF CEREBRAL HEMISPHERE, PERCUTANEOUS APPROACH, DIAGNOSTIC: ICD-10-PCS | Performed by: NEUROLOGICAL SURGERY

## 2023-07-27 RX ORDER — LIDOCAINE HYDROCHLORIDE 40 MG/ML
SOLUTION TOPICAL AS NEEDED
Status: DISCONTINUED | OUTPATIENT
Start: 2023-07-27 | End: 2023-07-27 | Stop reason: SURG

## 2023-07-27 RX ORDER — POLYETHYLENE GLYCOL 3350 17 G/17G
17 POWDER, FOR SOLUTION ORAL DAILY PRN
Status: DISCONTINUED | OUTPATIENT
Start: 2023-07-27 | End: 2023-07-28

## 2023-07-27 RX ORDER — NALOXONE HYDROCHLORIDE 0.4 MG/ML
80 INJECTION, SOLUTION INTRAMUSCULAR; INTRAVENOUS; SUBCUTANEOUS AS NEEDED
Status: ACTIVE | OUTPATIENT
Start: 2023-07-27 | End: 2023-07-27

## 2023-07-27 RX ORDER — MORPHINE SULFATE 4 MG/ML
2 INJECTION, SOLUTION INTRAMUSCULAR; INTRAVENOUS EVERY 10 MIN PRN
Status: DISCONTINUED | OUTPATIENT
Start: 2023-07-27 | End: 2023-07-28

## 2023-07-27 RX ORDER — ENEMA 19; 7 G/133ML; G/133ML
1 ENEMA RECTAL ONCE AS NEEDED
Status: DISCONTINUED | OUTPATIENT
Start: 2023-07-27 | End: 2023-07-28

## 2023-07-27 RX ORDER — HYDROMORPHONE HYDROCHLORIDE 1 MG/ML
0.2 INJECTION, SOLUTION INTRAMUSCULAR; INTRAVENOUS; SUBCUTANEOUS EVERY 2 HOUR PRN
Status: DISCONTINUED | OUTPATIENT
Start: 2023-07-27 | End: 2023-07-28

## 2023-07-27 RX ORDER — ONDANSETRON 2 MG/ML
4 INJECTION INTRAMUSCULAR; INTRAVENOUS EVERY 6 HOURS PRN
Status: DISCONTINUED | OUTPATIENT
Start: 2023-07-27 | End: 2023-07-28

## 2023-07-27 RX ORDER — SCOLOPAMINE TRANSDERMAL SYSTEM 1 MG/1
1 PATCH, EXTENDED RELEASE TRANSDERMAL
Status: DISCONTINUED | OUTPATIENT
Start: 2023-07-27 | End: 2023-07-27 | Stop reason: HOSPADM

## 2023-07-27 RX ORDER — LIDOCAINE HYDROCHLORIDE 10 MG/ML
INJECTION, SOLUTION EPIDURAL; INFILTRATION; INTRACAUDAL; PERINEURAL AS NEEDED
Status: DISCONTINUED | OUTPATIENT
Start: 2023-07-27 | End: 2023-07-27 | Stop reason: SURG

## 2023-07-27 RX ORDER — LIDOCAINE HYDROCHLORIDE AND EPINEPHRINE 10; 10 MG/ML; UG/ML
INJECTION, SOLUTION INFILTRATION; PERINEURAL AS NEEDED
Status: DISCONTINUED | OUTPATIENT
Start: 2023-07-27 | End: 2023-07-27

## 2023-07-27 RX ORDER — ROCURONIUM BROMIDE 10 MG/ML
INJECTION, SOLUTION INTRAVENOUS AS NEEDED
Status: DISCONTINUED | OUTPATIENT
Start: 2023-07-27 | End: 2023-07-27 | Stop reason: SURG

## 2023-07-27 RX ORDER — OXYCODONE HYDROCHLORIDE 5 MG/1
5 TABLET ORAL EVERY 4 HOURS PRN
Status: DISCONTINUED | OUTPATIENT
Start: 2023-07-27 | End: 2023-07-28

## 2023-07-27 RX ORDER — EPHEDRINE SULFATE 50 MG/ML
INJECTION, SOLUTION INTRAVENOUS AS NEEDED
Status: DISCONTINUED | OUTPATIENT
Start: 2023-07-27 | End: 2023-07-27 | Stop reason: SURG

## 2023-07-27 RX ORDER — SODIUM CHLORIDE, SODIUM LACTATE, POTASSIUM CHLORIDE, CALCIUM CHLORIDE 600; 310; 30; 20 MG/100ML; MG/100ML; MG/100ML; MG/100ML
INJECTION, SOLUTION INTRAVENOUS CONTINUOUS
Status: DISCONTINUED | OUTPATIENT
Start: 2023-07-27 | End: 2023-07-28

## 2023-07-27 RX ORDER — HYDROMORPHONE HYDROCHLORIDE 1 MG/ML
0.4 INJECTION, SOLUTION INTRAMUSCULAR; INTRAVENOUS; SUBCUTANEOUS EVERY 5 MIN PRN
Status: DISCONTINUED | OUTPATIENT
Start: 2023-07-27 | End: 2023-07-28

## 2023-07-27 RX ORDER — HYDRALAZINE HYDROCHLORIDE 20 MG/ML
10 INJECTION INTRAMUSCULAR; INTRAVENOUS
Status: DISCONTINUED | OUTPATIENT
Start: 2023-07-27 | End: 2023-07-28

## 2023-07-27 RX ORDER — SODIUM CHLORIDE 9 MG/ML
INJECTION, SOLUTION INTRAVENOUS CONTINUOUS PRN
Status: DISCONTINUED | OUTPATIENT
Start: 2023-07-27 | End: 2023-07-27 | Stop reason: SURG

## 2023-07-27 RX ORDER — HYDROMORPHONE HYDROCHLORIDE 1 MG/ML
0.6 INJECTION, SOLUTION INTRAMUSCULAR; INTRAVENOUS; SUBCUTANEOUS EVERY 5 MIN PRN
Status: DISCONTINUED | OUTPATIENT
Start: 2023-07-27 | End: 2023-07-28

## 2023-07-27 RX ORDER — OXYCODONE HYDROCHLORIDE 5 MG/1
2.5 TABLET ORAL EVERY 4 HOURS PRN
Status: DISCONTINUED | OUTPATIENT
Start: 2023-07-27 | End: 2023-07-28

## 2023-07-27 RX ORDER — SENNOSIDES 8.6 MG
17.2 TABLET ORAL NIGHTLY PRN
Status: DISCONTINUED | OUTPATIENT
Start: 2023-07-27 | End: 2023-07-28

## 2023-07-27 RX ORDER — MORPHINE SULFATE 4 MG/ML
4 INJECTION, SOLUTION INTRAMUSCULAR; INTRAVENOUS EVERY 10 MIN PRN
Status: DISCONTINUED | OUTPATIENT
Start: 2023-07-27 | End: 2023-07-28

## 2023-07-27 RX ORDER — BISACODYL 10 MG
10 SUPPOSITORY, RECTAL RECTAL
Status: DISCONTINUED | OUTPATIENT
Start: 2023-07-27 | End: 2023-07-28

## 2023-07-27 RX ORDER — HYDROMORPHONE HYDROCHLORIDE 1 MG/ML
0.2 INJECTION, SOLUTION INTRAMUSCULAR; INTRAVENOUS; SUBCUTANEOUS EVERY 5 MIN PRN
Status: DISCONTINUED | OUTPATIENT
Start: 2023-07-27 | End: 2023-07-28

## 2023-07-27 RX ORDER — DEXAMETHASONE SODIUM PHOSPHATE 4 MG/ML
VIAL (ML) INJECTION AS NEEDED
Status: DISCONTINUED | OUTPATIENT
Start: 2023-07-27 | End: 2023-07-27 | Stop reason: SURG

## 2023-07-27 RX ORDER — ONDANSETRON 2 MG/ML
INJECTION INTRAMUSCULAR; INTRAVENOUS AS NEEDED
Status: DISCONTINUED | OUTPATIENT
Start: 2023-07-27 | End: 2023-07-27 | Stop reason: SURG

## 2023-07-27 RX ORDER — MORPHINE SULFATE 10 MG/ML
6 INJECTION, SOLUTION INTRAMUSCULAR; INTRAVENOUS EVERY 10 MIN PRN
Status: DISCONTINUED | OUTPATIENT
Start: 2023-07-27 | End: 2023-07-28

## 2023-07-27 RX ORDER — HYDROMORPHONE HYDROCHLORIDE 1 MG/ML
0.4 INJECTION, SOLUTION INTRAMUSCULAR; INTRAVENOUS; SUBCUTANEOUS EVERY 2 HOUR PRN
Status: DISCONTINUED | OUTPATIENT
Start: 2023-07-27 | End: 2023-07-28

## 2023-07-27 RX ADMIN — EPHEDRINE SULFATE 10 MG: 50 INJECTION, SOLUTION INTRAVENOUS at 08:45:00

## 2023-07-27 RX ADMIN — SODIUM CHLORIDE: 9 INJECTION, SOLUTION INTRAVENOUS at 07:48:00

## 2023-07-27 RX ADMIN — SODIUM CHLORIDE: 9 INJECTION, SOLUTION INTRAVENOUS at 08:08:00

## 2023-07-27 RX ADMIN — EPHEDRINE SULFATE 10 MG: 50 INJECTION, SOLUTION INTRAVENOUS at 09:17:00

## 2023-07-27 RX ADMIN — SODIUM CHLORIDE, SODIUM LACTATE, POTASSIUM CHLORIDE, CALCIUM CHLORIDE: 600; 310; 30; 20 INJECTION, SOLUTION INTRAVENOUS at 09:08:00

## 2023-07-27 RX ADMIN — ROCURONIUM BROMIDE 50 MG: 10 INJECTION, SOLUTION INTRAVENOUS at 08:05:00

## 2023-07-27 RX ADMIN — SODIUM CHLORIDE: 9 INJECTION, SOLUTION INTRAVENOUS at 09:14:00

## 2023-07-27 RX ADMIN — ROCURONIUM BROMIDE 20 MG: 10 INJECTION, SOLUTION INTRAVENOUS at 08:55:00

## 2023-07-27 RX ADMIN — ROCURONIUM BROMIDE 20 MG: 10 INJECTION, SOLUTION INTRAVENOUS at 09:24:00

## 2023-07-27 RX ADMIN — EPHEDRINE SULFATE 10 MG: 50 INJECTION, SOLUTION INTRAVENOUS at 08:26:00

## 2023-07-27 RX ADMIN — LIDOCAINE HYDROCHLORIDE 50 MG: 10 INJECTION, SOLUTION EPIDURAL; INFILTRATION; INTRACAUDAL; PERINEURAL at 08:05:00

## 2023-07-27 RX ADMIN — EPHEDRINE SULFATE 10 MG: 50 INJECTION, SOLUTION INTRAVENOUS at 09:00:00

## 2023-07-27 RX ADMIN — DEXAMETHASONE SODIUM PHOSPHATE 8 MG: 4 MG/ML VIAL (ML) INJECTION at 08:24:00

## 2023-07-27 RX ADMIN — SODIUM CHLORIDE, SODIUM LACTATE, POTASSIUM CHLORIDE, CALCIUM CHLORIDE: 600; 310; 30; 20 INJECTION, SOLUTION INTRAVENOUS at 08:15:00

## 2023-07-27 RX ADMIN — ONDANSETRON 4 MG: 2 INJECTION INTRAMUSCULAR; INTRAVENOUS at 08:24:00

## 2023-07-27 RX ADMIN — LIDOCAINE HYDROCHLORIDE 2 ML: 40 SOLUTION TOPICAL at 08:07:00

## 2023-07-27 NOTE — PLAN OF CARE
Libby Hay remains alert and oriented overnight, call light in reach no complaints of pain. NPO after midnight. Problem: Patient Centered Care  Goal: Patient preferences are identified and integrated in the patient's plan of care  Description: Interventions:  - What would you like us to know as we care for you? Its difficult to read at the moment  - Provide timely, complete, and accurate information to patient/family  - Incorporate patient and family knowledge, values, beliefs, and cultural backgrounds into the planning and delivery of care  - Encourage patient/family to participate in care and decision-making at the level they choose  - Honor patient and family perspectives and choices  Outcome: Progressing     Problem: CARDIOVASCULAR - ADULT  Goal: Maintains optimal cardiac output and hemodynamic stability  Description: INTERVENTIONS:  - Monitor vital signs, rhythm, and trends  - Monitor for bleeding, hypotension and signs of decreased cardiac output  - Evaluate effectiveness of vasoactive medications to optimize hemodynamic stability  - Monitor arterial and/or venous puncture sites for bleeding and/or hematoma  - Assess quality of pulses, skin color and temperature  - Assess for signs of decreased coronary artery perfusion - ex.  Angina  - Evaluate fluid balance, assess for edema, trend weights  Outcome: Progressing     Problem: RESPIRATORY - ADULT  Goal: Achieves optimal ventilation and oxygenation  Description: INTERVENTIONS:  - Assess for changes in respiratory status  - Assess for changes in mentation and behavior  - Position to facilitate oxygenation and minimize respiratory effort  - Oxygen supplementation based on oxygen saturation or ABGs  - Provide Smoking Cessation handout, if applicable  - Encourage broncho-pulmonary hygiene including cough, deep breathe, Incentive Spirometry  - Assess the need for suctioning and perform as needed  - Assess and instruct to report SOB or any respiratory difficulty  - Respiratory Therapy support as indicated  - Manage/alleviate anxiety  - Monitor for signs/symptoms of CO2 retention  Outcome: Progressing     Problem: SKIN/TISSUE INTEGRITY - ADULT  Goal: Skin integrity remains intact  Description: INTERVENTIONS  - Assess and document risk factors for pressure ulcer development  - Assess and document skin integrity  - Monitor for areas of redness and/or skin breakdown  - Initiate interventions, skin care algorithm/standards of care as needed  Outcome: Progressing     Problem: NEUROLOGICAL - ADULT  Goal: Achieves stable or improved neurological status  Description: INTERVENTIONS  - Assess for and report changes in neurological status  - Initiate measures to prevent increased intracranial pressure  - Maintain blood pressure and fluid volume within ordered parameters to optimize cerebral perfusion and minimize risk of hemorrhage  - Monitor temperature, glucose, and sodium.  Initiate appropriate interventions as ordered  Outcome: Progressing  Goal: Achieves maximal functionality and self care  Description: INTERVENTIONS  - Monitor swallowing and airway patency with patient fatigue and changes in neurological status  - Encourage and assist patient to increase activity and self care with guidance from PT/OT  - Encourage visually impaired, hearing impaired and aphasic patients to use assistive/communication devices  Outcome: Progressing

## 2023-07-27 NOTE — SLP NOTE
ADULT SWALLOWING EVALUATION    ASSESSMENT    ASSESSMENT/OVERALL IMPRESSION:    Proper PPE worn. Surgical mask and gloves. Hands sanitized upon entrance/exit Pt room. This BSE was ordered 2/2 Pt S/P brain biopsy. Pt admitted 7/22/23 with brain mass. Pt on solid/thin liquids at home, with spouse. PMH includes muscle weakness. No PMH of dysphagia at [facility]. Pt denies any past swallowing difficulty. Currently, Pt NPO S/P brain biopsy. Prior, Pt on solid/thin liquid diet. MRI Brain 7/24/23:  CONCLUSION:   1. Multiple (at least 5 total) heterogeneously enhancing left cerebral hemispheric mass is in the left parietal, occipital, and posterior temporal lobes as detailed. There is moderate to severe surrounding vasogenic edema, which extends to the left thalamus and splenium of the corpus callosum. Resultant left cerebral hemispheric mass effect with 8 mm left-to-right midline shift. Primary differential considerations include multifocal intracranial metastases, a multicentric primary glial   neoplasm/glioblastoma, or primary CNS lymphoma amongst other less likely possibilities. Consider histologic sampling for definitive characterization. Notably, some of the aforementioned rim enhancing lesions demonstrate low-grade intrinsic susceptibility artifacts, which could relate to low-grade hemorrhagic products or calcification. 2. Small 1 cm enhancing lesion in the left frontal calvarium is favored to represent incidental dural venous lake or arachnoid granulation. 3. Nonspecific rightward conjugate gaze deviation at the time of imaging. 4. Lesser incidental findings as above. No CXR completed. Pt alert, on room air, afebrile and assessed sitting upright in bed (after consulting with RN). Pt agreed to participate. Pt with throat clearing baseline. Volitional swallow and cough present; considered functional. Oral motor exam unremarkable. Natural dentition. Pt self-fed solid and thin liquid trials. Bilabial seal adequate; no anterior loss. Lingual skills adequate for bolus formation, preparation and control of all consistencies. Bite reflex of solid functional in strength. Rotary, coordinated mastication skills. Oral cavity clear post swallows. Pharyngeal response appeared slightly delayed per hyolaryngeal elevation to completion (considered functional in strength/rise to palpation). No overt clinical signs/symptoms of aspiration on solid nor thin liquids via open cup (no throat clearing, no coughing, no SOB and clear vocal quality); throat clearing S/P chain swallows of thin liquids via straw (to be noted throat clearing present at baseline). Overall, swallow function appeared weak. Sp02 ~96% during this assessment. IMPRESSIONS:    Pt presents with functional oral swallow and possible pharyngeal dysfunction. Collaborated with RN regarding Pt's swallowing plan of care. BSE results/recommendations discussed with Pt. Pt with fair understanding; would benefit from additional reinforcement. Swallowing precautions written on white board in room for reinforcement/carry-over of skills for Pt, family and staff. Call light within Pt's reach upon SLP discharge from room. PLAN:    To f/u x2 sessions to ensure safe intake of diet and reinforce swallowing/aspiration precautions. To monitor for new CXR results. Family education as available. Pt with report of memory deficits. To f/u with cog eval pending overall GOC/next LOC as desired by MD.      RECOMMENDATIONS   Diet Recommendations - Solids: Regular  Diet Recommendations - Liquids: Thin Liquids    Compensatory Strategies Recommended: No straws  Aspiration Precautions: Upright position; Slow rate;Small bites and sips; No straw  Medication Administration Recommendations: Whole in puree  Treatment Plan/Recommendations: Aspiration precautions  Discharge Recommendations/Plan: Undetermined    HISTORY   MEDICAL HISTORY  Reason for Referral:  (Brain Mass)    Problem List  Principal Problem:    Brain mass  Active Problems:    Vasogenic edema (HCC)    Brain compression (HCC)    Midline shift of brain    Right homonymous hemianopsia      Past Medical History  Past Medical History:   Diagnosis Date    Abnormality of gait 12/18/2017    BRCA1 negative 2018    BRCA2 negative 2018    Decreased range of motion of left knee 5/11/2018    Decreased strength of lower extremity 9/10/2019    Infected prosthetic knee joint      Muscle weakness     Osteoarthritis     PONV (postoperative nausea and vomiting)     profound nausea and vomiting    Pre-op exam 10/19/2017    Visual impairment     glasses     Prior Living Situation: Home with spouse  Diet Prior to Admission: Regular; Thin liquids  Precautions: Aspiration    Patient/Family Goals: to eat    SWALLOWING HISTORY  Current Diet Consistency: NPO    OBJECTIVE   ORAL MOTOR EXAMINATION  Dentition: Natural;Functional  Symmetry: Within Functional Limits  Strength: Within Functional Limits  Range of Motion: Within Functional Limits  Rate of Motion: Within Functional Limits    Voice Quality: Clear  Respiratory Status: Unlabored  Consistencies Trialed: Thin liquids; Hard solid  Method of Presentation: Self presentation;Cup;Spoon  Patient Positioning: Upright;Midline    Oral Phase of Swallow: Within Functional Limits  Pharyngeal Phase of Swallow: Impaired  Laryngeal Elevation Timing: Appears impaired    (Please note: Silent aspiration cannot be evaluated clinically. Videofluoroscopic Swallow Study is required to rule-out silent aspiration.)    GOALS  Goal #1 The patient will tolerate solid consistency and thin liquids without overt signs or symptoms of aspiration with 100 % accuracy over 2 session(s). In Progress   Goal #2 The patient/family/caregiver will demonstrate understanding and implementation of aspiration precautions and swallow strategies independently over 2 session(s).     In Progress   Goal #3 The patient will utilize compensatory strategies as outlined by  BSSE (clinical evaluation) including Slow rate, Small bites, Small sips, No straws, Upright 90 degrees with no feeding assistance 100 % of the time across 2 sessions.   In Progress   FOLLOW UP  Treatment Plan/Recommendations: Aspiration precautions  Number of Visits to Meet Established Goals: 2  Follow Up Needed (Documentation Required): Yes  SLP Follow-up Date: 07/28/23    Thank you for your referral.   If you have any questions, please contact   Kojo Sandoval M.S. SHEELA/SLP  Speech-Language Pathologist  [facility]  #[phone]

## 2023-07-27 NOTE — ANESTHESIA PROCEDURE NOTES
Airway  Date/Time: 7/27/2023 8:08 AM  Urgency: elective    Airway not difficult    General Information and Staff    Patient location during procedure: OR  Anesthesiologist: Nilsa Otto MD  Performed: anesthesiologist   Performed by: Nilsa Otto MD  Authorized by: Nilsa Otto MD      Indications and Patient Condition  Indications for airway management: anesthesia  Sedation level: deep  Preoxygenated: yes  Patient position: sniffing  Mask difficulty assessment: 1 - vent by mask    Final Airway Details  Final airway type: endotracheal airway      Successful airway: ETT  Cuffed: yes   Successful intubation technique: Video laryngoscopy  Facilitating devices/methods: intubating stylet  Endotracheal tube insertion site: oral  Blade: Liz  Blade size: #3  ETT size (mm): 7.0    Cormack-Lehane Classification: grade I - full view of glottis  Placement verified by: capnometry   Measured from: lips  ETT to lips (cm): 20  Number of attempts at approach: 1  Number of other approaches attempted: 0

## 2023-07-27 NOTE — OPERATIVE REPORT
Neurosurgery operative report        Preoperative diagnosis:  1. Left parietal occipital multifocal ring-enhancing lesion      Postoperative diagnosis:  Same      Procedure performed:  1. Left parietal stereotactic brain biopsy  2. Use of stereotactic guidance        Surgeon: Manolo Gonzalez MD        Assistant: Mason Marks PA-C        Anesthesia: General endotracheal        Estimated blood loss: Less than 10 cc        Indications for procedure:    Please also see the relevant progress notes for further information. Briefly, this patient presented to the hospital with several neurological concerns. Imaging work-up was undertaken. This demonstrated multifocal left parietal occipital ring-enhancing lesion. The patient was counseled regarding the differential diagnosis, as well as available surgical and nonsurgical treatment options. Based on the need for tissue diagnosis, she decided to proceed with stereotactic brain biopsy following a discussion of risks and benefits. Procedure in detail:    The patient was reevaluated in the preoperative holding area. The patient was reexamined. The operation was reviewed, including risks, benefits, and alternatives. Informed consent was verified. The patient was then brought back to the operating theater. A timeout was performed per protocol. General endotracheal anesthesia was induced. Lines and monitors were started by the anesthesiologist.    The patient was moved to a supine position on a standard operating table. A roll was placed beneath the left shoulder. A Griffith 3 pin headrest was applied. The patient's head was rotated to the right, the Griffith was affixed to the operating table. The stereotactic system was set up and registered. Satisfactory registration was found based on confirmation of known anatomic landmarks. The intended target lesion was marked out, as well as the entry point. The incision was marked.   Hair was removed with surgical clippers. The scalp was cleansed with isopropyl alcohol. The incision was marked once again. DuraPrep was now applied followed by standard sterile surgical draping. Lidocaine with epinephrine was injected. Skin incision was made with a #10 scalpel blade. A self-retaining retractor was placed. A bur hole was created with a high-speed drill. Dura was coagulated with bipolar electrocautery. The stereotactic guide arm was now set up, and the entry point was confirmed. Dura was incised with a #15 scalpel blade and coagulated with bipolar electrocautery. Corticotomy was created with a #11 scalpel blade and bipolar electrocautery. Using stereotactic guidance, a stereotactic biopsy needle was now inserted into the lesion. Several specimens were taken for frozen section. Several more specimens were taken for permanent sections in the same spot. I personally reviewed the frozen sections with the pathologist.  This demonstrated abnormal tissue, suggestive of astrocytoma. Permanent sections were now handed off the table and transported to pathology. The incision was irrigated with saline solution. A piece of Gelfoam was placed. A bur hole cover was placed. The incision was closed in layers. Pennelope Jody was closed with interrupted inverted 3-0 Vicryl's. Skin was closed with staples. A dressing was applied. Drapes removed. The patient was now returned to a neutral, supine position. Disposition: Critical care unit    Condition: Stable, extubated, and neurologically at baseline    Counts: All needle, sponge, and cottonoid counts were reported correct at the end of the operation. Complications: None    Wound classification: 1, clean    Implants: [place] cranial plating system    Specimen: Frozen and permanent section; see body of report. This report was dictated using voice recognition technology.   Errors in syntax or recognition may occur, and should not be construed to change the meaning of a sentence.

## 2023-07-27 NOTE — PLAN OF CARE
Left parietal stereotactic brain biopsy. Await pathology. Q1 Neuros. SLP passed patient for diet. Maintained patient safety. Problem: Patient Centered Care  Goal: Patient preferences are identified and integrated in the patient's plan of care  Description: Interventions:  - What would you like us to know as we care for you? Its difficult to read at the moment  - Provide timely, complete, and accurate information to patient/family  - Incorporate patient and family knowledge, values, beliefs, and cultural backgrounds into the planning and delivery of care  - Encourage patient/family to participate in care and decision-making at the level they choose  - Honor patient and family perspectives and choices  Outcome: Progressing     Problem: CARDIOVASCULAR - ADULT  Goal: Maintains optimal cardiac output and hemodynamic stability  Description: INTERVENTIONS:  - Monitor vital signs, rhythm, and trends  - Monitor for bleeding, hypotension and signs of decreased cardiac output  - Evaluate effectiveness of vasoactive medications to optimize hemodynamic stability  - Monitor arterial and/or venous puncture sites for bleeding and/or hematoma  - Assess quality of pulses, skin color and temperature  - Assess for signs of decreased coronary artery perfusion - ex.  Angina  - Evaluate fluid balance, assess for edema, trend weights  Outcome: Progressing     Problem: RESPIRATORY - ADULT  Goal: Achieves optimal ventilation and oxygenation  Description: INTERVENTIONS:  - Assess for changes in respiratory status  - Assess for changes in mentation and behavior  - Position to facilitate oxygenation and minimize respiratory effort  - Oxygen supplementation based on oxygen saturation or ABGs  - Provide Smoking Cessation handout, if applicable  - Encourage broncho-pulmonary hygiene including cough, deep breathe, Incentive Spirometry  - Assess the need for suctioning and perform as needed  - Assess and instruct to report SOB or any respiratory difficulty  - Respiratory Therapy support as indicated  - Manage/alleviate anxiety  - Monitor for signs/symptoms of CO2 retention  Outcome: Progressing     Problem: SKIN/TISSUE INTEGRITY - ADULT  Goal: Skin integrity remains intact  Description: INTERVENTIONS  - Assess and document risk factors for pressure ulcer development  - Assess and document skin integrity  - Monitor for areas of redness and/or skin breakdown  - Initiate interventions, skin care algorithm/standards of care as needed  Outcome: Progressing     Problem: NEUROLOGICAL - ADULT  Goal: Achieves stable or improved neurological status  Description: INTERVENTIONS  - Assess for and report changes in neurological status  - Initiate measures to prevent increased intracranial pressure  - Maintain blood pressure and fluid volume within ordered parameters to optimize cerebral perfusion and minimize risk of hemorrhage  - Monitor temperature, glucose, and sodium.  Initiate appropriate interventions as ordered  Outcome: Progressing  Goal: Achieves maximal functionality and self care  Description: INTERVENTIONS  - Monitor swallowing and airway patency with patient fatigue and changes in neurological status  - Encourage and assist patient to increase activity and self care with guidance from PT/OT  - Encourage visually impaired, hearing impaired and aphasic patients to use assistive/communication devices  Outcome: Progressing     Problem: PAIN - ADULT  Goal: Verbalizes/displays adequate comfort level or patient's stated pain goal  Description: INTERVENTIONS:  - Encourage pt to monitor pain and request assistance  - Assess pain using appropriate pain scale  - Administer analgesics based on type and severity of pain and evaluate response  - Implement non-pharmacological measures as appropriate and evaluate response  - Consider cultural and social influences on pain and pain management  - Manage/alleviate anxiety  - Utilize distraction and/or relaxation techniques  - Monitor for opioid side effects  - Notify MD/LIP if interventions unsuccessful or patient reports new pain  - Anticipate increased pain with activity and pre-medicate as appropriate  Outcome: Progressing

## 2023-07-27 NOTE — OCCUPATIONAL THERAPY NOTE
OT orders rcvd; pt going off the floor today for Left side stereotactic biopsy 2/2 Multifocal masses involving the left parietal and occipital lobes with significant vasogenic edema 9 mm rightward shift.  Will reschedule follow up for 7/28    [facility] [place], Occupational Therapist, OTR/L ext [phone]

## 2023-07-27 NOTE — BRIEF OP NOTE
Pre-Operative Diagnosis: Brain tumor     Post-Operative Diagnosis: Brain tumor      Procedure Performed:   Left side stereotactic biopsy    Surgeon(s) and Role:     Flaquita Harding MD - Primary    Assistant(s):  PA: Zulma Altamirano PA-C     Surgical Findings: see op note      Specimen: frozen and permanent     Estimated Blood Loss: No data recorded    Thuan Ramirez MD  7/27/2023  9:54 AM

## 2023-07-28 ENCOUNTER — APPOINTMENT (OUTPATIENT)
Dept: CT IMAGING | Facility: HOSPITAL | Age: 68
DRG: 025 | End: 2023-07-28
Attending: STUDENT IN AN ORGANIZED HEALTH CARE EDUCATION/TRAINING PROGRAM
Payer: MEDICARE

## 2023-07-28 VITALS
HEIGHT: 63 IN | SYSTOLIC BLOOD PRESSURE: 131 MMHG | HEART RATE: 70 BPM | BODY MASS INDEX: 37.89 KG/M2 | TEMPERATURE: 98 F | WEIGHT: 213.88 LBS | RESPIRATION RATE: 17 BRPM | DIASTOLIC BLOOD PRESSURE: 85 MMHG | OXYGEN SATURATION: 98 %

## 2023-07-28 PROCEDURE — 70450 CT HEAD/BRAIN W/O DYE: CPT | Performed by: STUDENT IN AN ORGANIZED HEALTH CARE EDUCATION/TRAINING PROGRAM

## 2023-07-28 RX ORDER — ACETAMINOPHEN 500 MG
TABLET ORAL EVERY 6 HOURS PRN
Qty: 160 TABLET | Refills: 0 | Status: SHIPPED | OUTPATIENT
Start: 2023-07-28

## 2023-07-28 RX ORDER — DEXAMETHASONE 4 MG/1
4 TABLET ORAL 2 TIMES DAILY WITH MEALS
Qty: 60 TABLET | Refills: 1 | Status: SHIPPED | OUTPATIENT
Start: 2023-07-28

## 2023-07-28 RX ORDER — NALOXONE HYDROCHLORIDE 4 MG/.1ML
4 SPRAY NASAL AS NEEDED
Qty: 1 KIT | Refills: 0 | Status: SHIPPED | OUTPATIENT
Start: 2023-07-28

## 2023-07-28 RX ORDER — OXYCODONE HYDROCHLORIDE 5 MG/1
5 TABLET ORAL EVERY 6 HOURS PRN
Qty: 12 TABLET | Refills: 0 | Status: SHIPPED | OUTPATIENT
Start: 2023-07-28

## 2023-07-28 RX ORDER — FOLIC ACID 1 MG/1
1 TABLET ORAL DAILY
Qty: 30 TABLET | Refills: 2 | Status: SHIPPED | OUTPATIENT
Start: 2023-07-29

## 2023-07-28 RX ORDER — FOLIC ACID 1 MG/1
1 TABLET ORAL DAILY
Status: DISCONTINUED | OUTPATIENT
Start: 2023-07-28 | End: 2023-07-28

## 2023-07-28 NOTE — DIETARY NOTE
BRIEF DIETITIAN NOTE     Pt re-screened for LOS (length of stay). Found to be at no nutrition risk at this time. Good PO intakes, % at all recorded meals. No wt loss, some gain noted. Please consult RD if further nutrition intervention is needed. Percent Meals Eaten (last 6 days)       Date/Time Percent Meals Eaten (%)    07/22/23 1900 80 %    07/22/23 2330 100 %    07/25/23 0800 50 %    07/25/23 1400 80 %    07/26/23 2100 75 %             Patient Weight(s) for the past 336 hrs:   Weight   07/27/23 2100 97 kg (213 lb 13.5 oz)   07/23/23 0500 97.7 kg (215 lb 6.2 oz)   07/22/23 1722 98.5 kg (217 lb 2.5 oz)   07/22/23 1022 95.3 kg (210 lb)        Wt Readings from Last 6 Encounters:  07/27/23 : 97 kg (213 lb 13.5 oz)  07/22/23 : 95.3 kg (210 lb 1.6 oz)  03/02/22 : 89.4 kg (197 lb)  02/10/22 : 89.8 kg (198 lb)  02/02/22 : 81.6 kg (180 lb)  08/31/21 : 97.5 kg (215 lb)       F/u per protocol or as appropriate.        Hortensia Santana RD, LDN  Clinical Dietitian  P: [phone]

## 2023-07-28 NOTE — PHYSICAL THERAPY NOTE
PHYSICAL THERAPY EVALUATION - INPATIENT     Room Number: 230/230-A  Evaluation Date: 7/28/2023  Type of Evaluation: Initial   Physician Order: PT Eval and Treat    Presenting Problem: Brain mass impacting vision s/p stereotactic biopsy 7/27/23  Co-Morbidities : ARIS/BSO, OA, s/p bilateral TKA 2017  Reason for Therapy: Mobility Dysfunction and Discharge Planning    PHYSICAL THERAPY ASSESSMENT     Patient is a 79year old female admitted 7/22/2023 for Brain mass impacting vision s/p stereotactic biopsy 7/27/23. Patient received supine in bed, agreeable to therapy evaluation. Vital signs monitored as noted below, no adverse symptoms and patient stable during session. Patient demonstrates independence without an assistive device with functional mobility skills as noted below. Pt reporting changes in close-up vision, distance vision intact, coordination and sensation intact. Pt able to tidy up room and perform ADLs in bathroom independently, picking up salt packet from ground without loss of balance, tolerating community distances without an assistive device. Patient verbalizes no further mobility concerns at this time, is functioning safely with mobility to D/C at this level of care. Updated nurse on results of session, patient left seated in bedside chair, all lines intact, all needs met with call light in reach. Patient history and/or personal factors that may impact the plan of care include co-morbidities (ARIS/BSO, OA) affecting medical status and prior surgeries (bilateral TKA 2017). Based on the physical therapy examination of the noted systems and mobility skills, the patient presentation is stable given the patient demonstrates no significant barriers to meeting therapy goals. Physical Therapy to sign off at this time, please re-consult if patient experiences a decline in functional status.     The patient's Approx Degree of Impairment: 11.2% has been calculated based on documentation in the [facility] '6 clicks' Inpatient Basic Mobility Short Form. Research supports that patients with this level of impairment may benefit from home with no skilled therapy needs. DISCHARGE RECOMMENDATIONS  PT Discharge Recommendations: Home    PLAN    Patient has been evaluated and presents with no skilled Physical Therapy needs at this time. Patient will be discharged from Physical Therapy services. Please re-order if a new functional limitation presents during this admission. PHYSICAL THERAPY MEDICAL/SOCIAL HISTORY       Problem List  Principal Problem:    Brain mass  Active Problems:    Vasogenic edema (HCC)    Brain compression (HCC)    Midline shift of brain    Right homonymous hemianopsia      HOME SITUATION  Home Situation  Type of Home: House  Home Layout: One level  Lives With: Spouse  Drives: Yes  Patient Owned Equipment: Rolling walker;Cane;Wheelchair  Patient Regularly Uses: Glasses     Prior Level of [place]: independent no assistive device, drives    SUBJECTIVE  \"It takes me a half hour just to type 2 lines. \"    PHYSICAL THERAPY EXAMINATION     OBJECTIVE  Precautions: Seizure  Fall Risk: Standard fall risk    WEIGHT BEARING RESTRICTION  None    PAIN ASSESSMENT  Ratin          COGNITION  Overall Cognitive Status:  WFL - within functional limits    RANGE OF MOTION AND STRENGTH ASSESSMENT  Upper extremity ROM and strength are within functional limits  BUEs  Lower extremity ROM is within functional limits  BLEs  Lower extremity strength is within functional limits  BLEs    BALANCE  Static Sitting: Normal  Dynamic Sitting: Normal  Static Standing: Normal  Dynamic Standing: Normal    ADDITIONAL TESTS                       Other Test(s): impaired vision close up/reading, distance normal            NEUROLOGICAL FINDINGS  Coordination - Finger to Nose: Symmetrical        Sensation: WNL          ACTIVITY TOLERANCE  Pulse: 89 (at rest, 116 with activity)  Heart Rate Source: Monitor     BP: 103/61 (pre, 109/74 post)  BP Location: Left arm  BP Method: Automatic  Patient Position: Sitting    O2 WALK  Oxygen Therapy  SPO2% on Room Air at Rest: 96  SPO2% Ambulation on Room Air: 98    AM-PAC '6-Clicks' INPATIENT SHORT FORM - BASIC MOBILITY  How much difficulty does the patient currently have. .. Patient Difficulty: Turning over in bed (including adjusting bedclothes, sheets and blankets)?: None   Patient Difficulty: Sitting down on and standing up from a chair with arms (e.g., wheelchair, bedside commode, etc.): None   Patient Difficulty: Moving from lying on back to sitting on the side of the bed?: None   How much help from another person does the patient currently need. .. Help from Another: Moving to and from a bed to a chair (including a wheelchair)?: None   Help from Another: Need to walk in hospital room?: None   Help from Another: Climbing 3-5 steps with a railing?: A Little     AM-PAC Score:  Raw Score: 23   Approx Degree of Impairment: 11.2%   Standardized Score (AM-PAC Scale): 56.93   CMS Modifier (G-Code): CI    FUNCTIONAL ABILITY STATUS  Functional Mobility/Gait Assessment  Gait Assistance: Supervision; Independent  Distance (ft): 300'  Assistive Device: None  Pattern: Within Functional Limits    Exercise/Education Provided:  Bed mobility  Body mechanics  Energy conservation  Functional activity tolerated  Gait training  Posture  Transfer training    Patient End of Session: Up in chair;Needs met;Call light within reach;RN aware of session/findings    Patient was able to achieve the following . ..    Patient able to transfer  Safely and independently    Patient able to ambulate on level surfaces  Safely and independently     Patient Evaluation Complexity Level:  History Moderate - 1 or 2 personal factors and/or co-morbidities   Examination of body systems Low - addressing 1-2 elements   Clinical Presentation Low - Stable   Clinical Decision Making Low Complexity       Therapeutic Activity: 9 minutes    Triny Left, PT, DPT  [facility]  Inpatient Rehabilitation  Physical Therapy  [phone]

## 2023-07-28 NOTE — PLAN OF CARE
Oz Solo remains alert and oriented overnight, Q1 neuro checks completed. No acute changes. Call light in reach. Problem: Patient Centered Care  Goal: Patient preferences are identified and integrated in the patient's plan of care  Description: Interventions:  - What would you like us to know as we care for you? Its difficult to read at the moment  - Provide timely, complete, and accurate information to patient/family  - Incorporate patient and family knowledge, values, beliefs, and cultural backgrounds into the planning and delivery of care  - Encourage patient/family to participate in care and decision-making at the level they choose  - Honor patient and family perspectives and choices  Outcome: Progressing     Problem: CARDIOVASCULAR - ADULT  Goal: Maintains optimal cardiac output and hemodynamic stability  Description: INTERVENTIONS:  - Monitor vital signs, rhythm, and trends  - Monitor for bleeding, hypotension and signs of decreased cardiac output  - Evaluate effectiveness of vasoactive medications to optimize hemodynamic stability  - Monitor arterial and/or venous puncture sites for bleeding and/or hematoma  - Assess quality of pulses, skin color and temperature  - Assess for signs of decreased coronary artery perfusion - ex.  Angina  - Evaluate fluid balance, assess for edema, trend weights  Outcome: Progressing     Problem: RESPIRATORY - ADULT  Goal: Achieves optimal ventilation and oxygenation  Description: INTERVENTIONS:  - Assess for changes in respiratory status  - Assess for changes in mentation and behavior  - Position to facilitate oxygenation and minimize respiratory effort  - Oxygen supplementation based on oxygen saturation or ABGs  - Provide Smoking Cessation handout, if applicable  - Encourage broncho-pulmonary hygiene including cough, deep breathe, Incentive Spirometry  - Assess the need for suctioning and perform as needed  - Assess and instruct to report SOB or any respiratory difficulty  - Respiratory Therapy support as indicated  - Manage/alleviate anxiety  - Monitor for signs/symptoms of CO2 retention  Outcome: Progressing     Problem: SKIN/TISSUE INTEGRITY - ADULT  Goal: Skin integrity remains intact  Description: INTERVENTIONS  - Assess and document risk factors for pressure ulcer development  - Assess and document skin integrity  - Monitor for areas of redness and/or skin breakdown  - Initiate interventions, skin care algorithm/standards of care as needed  Outcome: Progressing     Problem: NEUROLOGICAL - ADULT  Goal: Achieves stable or improved neurological status  Description: INTERVENTIONS  - Assess for and report changes in neurological status  - Initiate measures to prevent increased intracranial pressure  - Maintain blood pressure and fluid volume within ordered parameters to optimize cerebral perfusion and minimize risk of hemorrhage  - Monitor temperature, glucose, and sodium.  Initiate appropriate interventions as ordered  Outcome: Progressing  Goal: Achieves maximal functionality and self care  Description: INTERVENTIONS  - Monitor swallowing and airway patency with patient fatigue and changes in neurological status  - Encourage and assist patient to increase activity and self care with guidance from PT/OT  - Encourage visually impaired, hearing impaired and aphasic patients to use assistive/communication devices  Outcome: Progressing     Problem: PAIN - ADULT  Goal: Verbalizes/displays adequate comfort level or patient's stated pain goal  Description: INTERVENTIONS:  - Encourage pt to monitor pain and request assistance  - Assess pain using appropriate pain scale  - Administer analgesics based on type and severity of pain and evaluate response  - Implement non-pharmacological measures as appropriate and evaluate response  - Consider cultural and social influences on pain and pain management  - Manage/alleviate anxiety  - Utilize distraction and/or relaxation techniques  - Monitor for opioid side effects  - Notify MD/LIP if interventions unsuccessful or patient reports new pain  - Anticipate increased pain with activity and pre-medicate as appropriate  Outcome: Progressing

## 2023-07-28 NOTE — DISCHARGE SUMMARY
General Medicine Discharge Summary     Patient ID:  Skyler Johnson  79year old  6/15/1955    Admit date: 7/22/2023    Discharge date and time: 7/28/23    Attending Physician: Lashaun Bland DO     Primary Care Physician: Abdulaziz Villar DO     Discharge Diagnoses:   Multifocal brain masses     Discharge Condition: stable    Disposition: home     Important Follow up:  Marlys Carbajal  [address]  [phone]    Follow up on 8/8/2023  1:15pm    Sangeeta Galvin MD  -follow up with oncology     Hospital Course:    Skyler Johnson is a 79year old female with PMHx significant for gastric bypass, prior ARIS/BSO at 45 and up-to-date on her age-appropriate cancer screening who presented with 1 week of worsening vision and impaired memory. Patient found to have multifocal masses involving the left parietal and occipital lobes with significant surrounding vasogenic edema with 9 mm rightward shift on initial imaging.       Multifocal Brain Masses   Presenting with vision changes, memory issues and unstable gait  -patient was up-to-date on age appropriate cancer screening, due for colonoscopy in 2028 and mammogram in December 2023, status post prior ARIS/BSO  -of note history of breast cancer in two of her sisters   -per onc imaging could be consistent with CNS malignancy as well  -Neurosurgery consulted, Decadron has been ongoing, MRI with and without contrast done on 7/24 (initial imaging was without contrast)  -no other primary source on other CT imaging   -path pending at time of discharge    Consults:   IP CONSULT TO NEUROSURGERY  IP CONSULT TO ONCOLOGY  IP CONSULT TO RESPIRATORY CARE  IP CONSULT TO CRITICAL CARE INTENSIVIST    Operative Procedures: Procedure(s) (LRB):  Left side stereotactic biopsy (Left)       Patient instructions:      Discharge medications reconciled with current medication list     Current Discharge Medication List    START taking these medications    acetaminophen 500 MG Oral Tab  Take 1-2 tablets (500-1,000 mg total) by mouth every 6 (six) hours as needed for Pain. oxyCODONE 5 MG Oral Tab  Take 1 tablet (5 mg total) by mouth every 6 (six) hours as needed for Pain. Naloxone HCl 4 MG/0.1ML Nasal Liquid  4 mg by Nasal route as needed. If patient remains unresponsive, repeat dose in other nostril 2-5 minutes after first dose. folic acid 1 MG Oral Tab  Take 1 tablet (1 mg total) by mouth daily. dexamethasone (DECADRON) 4 MG tablet  Take 1 tablet (4 mg total) by mouth 2 (two) times daily with meals. CONTINUE these medications which have NOT CHANGED    Multiple Vitamins-Minerals (MULTIVITAMIN ADULT OR)  Take by mouth. Ergocalciferol (VITAMIN D OR)  Take by mouth.     Code Status: Full Code    Exam on day of discharge:      07/28/23  1013   BP: 103/61   Pulse: 89   Resp:    Temp:      General: no acute distress, alert and oriented x 3  Heart: RRR  Lungs: clear bilaterally, no active wheezing  Abdomen: nontender, nondistended, intact BS  Extremities: no pedal edema   Neuro: no focal deficits    Total time coordinating care for discharge: Greater than 30 minutes    Margarette Dunbar DO

## 2023-07-28 NOTE — DISCHARGE INSTRUCTIONS
HOME CARE INSTRUCTIONS FOLLOWING CRANIOTOMY  Activity:  Do NOT drive until cleared by your surgeon. Do NOT operate heavy machinery while under the influence of narcotic pain medications or muscle relaxants. Ambulate several times a day. Short distances are better at first.  Gradually increase activity level as tolerated. Avoid prolonged immobility, as this may lead to complications such as pneumonia, blood clots, or pulmonary embolism. No lifting more than 10 lbs ( a gallon of milk). Avoid straining or bending at the waist.  No heavy exercising until cleared by surgeon. You may resume sexual activity when you feel well enough. What is Normal?  Headache or incisional pain is normal after your craniotomy. This typically lasts a few days to a few weeks   Facial or eye swelling is common after a craniotomy. This may take a few days to a week to resolve. Elevating your head can help with swelling, such as sleeping on additional pillows or sitting upright. Bruising can also occur and may take several weeks to fully resolve  Increased fatigue or tiredness following surgery is common. This usually lasts approximately 1 to 3 weeks. Make sure you are getting plenty of rest.    Medications:  Pain medications have been prescribed to you for severe pain. If the pain is not severe, you may take over the counter (OTC) Extra Strength Tylenol for pain relief. Narcotic pain medications may cause constipation. OTC stool softeners and laxatives such as Colace, Miralax, or Milk of Magnesia can be taken for constipation. If you continue to have constipation despite OTC medications, notify your surgeon. Do NOT take Aspirin or NSAIDS (Ibuprofen, Aleve, Advil, Motrin, or Naproxen) until cleared by surgeon. You may be given a muscle relaxant such as Flexeril (cyclobenzaprine), Zanaflex (tizanidine), or Valium.   These medications may cause drowsiness - do not drive or operate machinery while taking these medications. If you take blood thinners such as Coumadin (warfarin), Plavix (clopidogrel), Xarelto (rivaroxaban),  Eliquis (apixaban), or Pradaxa (dabigatran), your surgeon will clarify when it is safe to resume these medications. Wound Care  You may have sutures or staples in place. You will have a follow up appointment in 10-14 days for a wound check or removal of staples or sutures. You may shower on your 2nd or 3rd day after surgery. Do not take baths or submerge wound in water (swimming, hot tub) until after your wound check appointment. Water and soap may run over the incision, but avoid scrubbing, picking, or scratching the incision. Pat dry rather than rubbing with towel.       When to call your surgeon:  Shortness of breath, chest pain, unilateral leg swelling  Fever of 101 F or higher  New or worsening seizures  Severe persistent headaches unrelieved by pain medications or rest  New or worsening speech difficulties, visual problems, numbness or tingling, or weakness  Uncontrolled nausea or vomiting  Redness, drainage, swelling, or warmth of the incision

## 2023-07-28 NOTE — PLAN OF CARE
Okay to discharge home from all disciplines. Removed PIVs. Discussed discharge information, incision care, medications, and follow-up appointments with patient and spouse who acknowledge their understanding. Maintained patient safety. Problem: Patient Centered Care  Goal: Patient preferences are identified and integrated in the patient's plan of care  Description: Interventions:  - What would you like us to know as we care for you? Its difficult to read at the moment  - Provide timely, complete, and accurate information to patient/family  - Incorporate patient and family knowledge, values, beliefs, and cultural backgrounds into the planning and delivery of care  - Encourage patient/family to participate in care and decision-making at the level they choose  - Honor patient and family perspectives and choices  Outcome: Completed     Problem: CARDIOVASCULAR - ADULT  Goal: Maintains optimal cardiac output and hemodynamic stability  Description: INTERVENTIONS:  - Monitor vital signs, rhythm, and trends  - Monitor for bleeding, hypotension and signs of decreased cardiac output  - Evaluate effectiveness of vasoactive medications to optimize hemodynamic stability  - Monitor arterial and/or venous puncture sites for bleeding and/or hematoma  - Assess quality of pulses, skin color and temperature  - Assess for signs of decreased coronary artery perfusion - ex.  Angina  - Evaluate fluid balance, assess for edema, trend weights  Outcome: Completed     Problem: RESPIRATORY - ADULT  Goal: Achieves optimal ventilation and oxygenation  Description: INTERVENTIONS:  - Assess for changes in respiratory status  - Assess for changes in mentation and behavior  - Position to facilitate oxygenation and minimize respiratory effort  - Oxygen supplementation based on oxygen saturation or ABGs  - Provide Smoking Cessation handout, if applicable  - Encourage broncho-pulmonary hygiene including cough, deep breathe, Incentive Spirometry  - Assess the need for suctioning and perform as needed  - Assess and instruct to report SOB or any respiratory difficulty  - Respiratory Therapy support as indicated  - Manage/alleviate anxiety  - Monitor for signs/symptoms of CO2 retention  Outcome: Completed     Problem: SKIN/TISSUE INTEGRITY - ADULT  Goal: Skin integrity remains intact  Description: INTERVENTIONS  - Assess and document risk factors for pressure ulcer development  - Assess and document skin integrity  - Monitor for areas of redness and/or skin breakdown  - Initiate interventions, skin care algorithm/standards of care as needed  Outcome: Completed     Problem: NEUROLOGICAL - ADULT  Goal: Achieves stable or improved neurological status  Description: INTERVENTIONS  - Assess for and report changes in neurological status  - Initiate measures to prevent increased intracranial pressure  - Maintain blood pressure and fluid volume within ordered parameters to optimize cerebral perfusion and minimize risk of hemorrhage  - Monitor temperature, glucose, and sodium.  Initiate appropriate interventions as ordered  Outcome: Completed  Goal: Achieves maximal functionality and self care  Description: INTERVENTIONS  - Monitor swallowing and airway patency with patient fatigue and changes in neurological status  - Encourage and assist patient to increase activity and self care with guidance from PT/OT  - Encourage visually impaired, hearing impaired and aphasic patients to use assistive/communication devices  Outcome: Completed     Problem: PAIN - ADULT  Goal: Verbalizes/displays adequate comfort level or patient's stated pain goal  Description: INTERVENTIONS:  - Encourage pt to monitor pain and request assistance  - Assess pain using appropriate pain scale  - Administer analgesics based on type and severity of pain and evaluate response  - Implement non-pharmacological measures as appropriate and evaluate response  - Consider cultural and social influences on pain and pain management  - Manage/alleviate anxiety  - Utilize distraction and/or relaxation techniques  - Monitor for opioid side effects  - Notify MD/LIP if interventions unsuccessful or patient reports new pain  - Anticipate increased pain with activity and pre-medicate as appropriate  Outcome: Completed

## 2023-08-01 ENCOUNTER — PATIENT OUTREACH (OUTPATIENT)
Dept: CASE MANAGEMENT | Age: 68
End: 2023-08-01

## 2023-08-02 NOTE — PROGRESS NOTES
Responding to patient's voicemail. (Dc 7/28)    Mando Abbott MD  [address]  [address] [address]  [phone]  1 week    Hayder Arora DO  Family Medicine  primary care follow up  [address]  [address] [place] [address].  [phone]  Thursday 8/10 @1:40    Patient scheduled appointments. Confirmed with patient. Closing encounter.

## 2023-08-06 LAB
METHYLATION SCORE:: 0
METHYLATION SCORE:: 0

## 2023-08-08 ENCOUNTER — OFFICE VISIT (OUTPATIENT)
Dept: SURGERY | Facility: CLINIC | Age: 68
End: 2023-08-08
Payer: MEDICARE

## 2023-08-08 VITALS — DIASTOLIC BLOOD PRESSURE: 72 MMHG | SYSTOLIC BLOOD PRESSURE: 122 MMHG

## 2023-08-08 DIAGNOSIS — Z98.890 POSTOPERATIVE STATE: Primary | ICD-10-CM

## 2023-08-08 DIAGNOSIS — Z48.89 ENCOUNTER FOR POSTOPERATIVE WOUND CHECK: ICD-10-CM

## 2023-08-08 DIAGNOSIS — Z48.02 ENCOUNTER FOR STAPLE REMOVAL: ICD-10-CM

## 2023-08-08 DIAGNOSIS — Z98.890 STATUS POST STEREOTACTIC BRAIN BIOPSY: ICD-10-CM

## 2023-08-08 DIAGNOSIS — H53.461 RIGHT HOMONYMOUS HEMIANOPSIA: ICD-10-CM

## 2023-08-08 PROCEDURE — 99024 POSTOP FOLLOW-UP VISIT: CPT | Performed by: STUDENT IN AN ORGANIZED HEALTH CARE EDUCATION/TRAINING PROGRAM

## 2023-08-08 PROCEDURE — 3074F SYST BP LT 130 MM HG: CPT | Performed by: STUDENT IN AN ORGANIZED HEALTH CARE EDUCATION/TRAINING PROGRAM

## 2023-08-08 PROCEDURE — 1126F AMNT PAIN NOTED NONE PRSNT: CPT | Performed by: STUDENT IN AN ORGANIZED HEALTH CARE EDUCATION/TRAINING PROGRAM

## 2023-08-08 PROCEDURE — 3078F DIAST BP <80 MM HG: CPT | Performed by: STUDENT IN AN ORGANIZED HEALTH CARE EDUCATION/TRAINING PROGRAM

## 2023-08-08 PROCEDURE — 1160F RVW MEDS BY RX/DR IN RCRD: CPT | Performed by: STUDENT IN AN ORGANIZED HEALTH CARE EDUCATION/TRAINING PROGRAM

## 2023-08-08 PROCEDURE — 1111F DSCHRG MED/CURRENT MED MERGE: CPT | Performed by: STUDENT IN AN ORGANIZED HEALTH CARE EDUCATION/TRAINING PROGRAM

## 2023-08-08 PROCEDURE — 1159F MED LIST DOCD IN RCRD: CPT | Performed by: STUDENT IN AN ORGANIZED HEALTH CARE EDUCATION/TRAINING PROGRAM

## 2023-08-08 NOTE — PROGRESS NOTES
Established Neurosurgery Patient    Patient: Erasmo Record Number: [id number]  YOB: 1955  PCP: Stephanie Sarabia DO    Reason for visit: 2-week postoperative visit    HISTORY OF PRESENTING ILLNESS:  Jonah Waldron is a pleasant 79year old female who returns to the neurosurgery clinic today approximately 2 weeks s/p left sided stereotactic brain mass biopsy with Dr. Bindu Wright on 2023. Intraoperative pathology noted to be glioblastoma, CNS WHO grade 4. The patient has been doing well from a surgical standpoint since discharge from the hospital.  She denies any headaches, changes in speech, nausea/vomiting, dizziness/lightheadedness, or weakness in her upper or lower extremities. She continues to endorse difficulty with vision, particularly with objects that are closer to her face. She denies any worsening of her vision since discharge. She has not had any issues with her incision site. She has not had any constitutional symptoms or symptoms concerning for sepsis. She has kept her incision covered since surgery. She inquired about cutting her hair.      Past Medical History:   Diagnosis Date    Abnormality of gait 2017    BRCA1 negative     BRCA2 negative     Decreased range of motion of left knee 2018    Decreased strength of lower extremity 9/10/2019    Infected prosthetic knee joint      Muscle weakness     Osteoarthritis     PONV (postoperative nausea and vomiting)     profound nausea and vomiting    Pre-op exam 10/19/2017    Visual impairment     glasses      Past Surgical History:   Procedure Laterality Date    BENIGN BIOPSY LEFT  2018    stereotactic biopsy, benign          GASTRIC BYPASS,OBESE<100CM [id number]-EN-Y      HYSTERECTOMY      Ovaries removed as well - age 45    HYSTEROSCOPY      KNEE REPLACEMENT SURGERY      MANIPULATN KNEE JT+ANESTHESIA Right 2017    Dr Keon Clark JT+ANESTHESIA Left 2018    Dr Brunilda Cooks TOTAL KNEE REPLACEMENT Left 11/28/2017    Dr Qing Vargas History   Problem Relation Age of Onset    Hypertension Father     Cancer Sister         breast    Breast Cancer Sister 27        age at dx 27    Diabetes Brother     Breast Cancer Sister 35        age at dx 35    Breast Cancer Maternal Grandmother         unknown      Social History    Socioeconomic History      Marital status:     Tobacco Use      Smoking status: Never      Smokeless tobacco: Never    Vaping Use      Vaping Use: Never used    Substance and Sexual Activity      Alcohol use: Not Currently      Drug use: No      Sexual activity: Yes        Partners: Male       [place]                  HIVES   Current Medications:  Current Outpatient Medications   Medication Sig Dispense Refill    acetaminophen 500 MG Oral Tab Take 1-2 tablets (500-1,000 mg total) by mouth every 6 (six) hours as needed for Pain. 160 tablet 0    oxyCODONE 5 MG Oral Tab Take 1 tablet (5 mg total) by mouth every 6 (six) hours as needed for Pain. 12 tablet 0    Naloxone HCl 4 MG/0.1ML Nasal Liquid 4 mg by Nasal route as needed. If patient remains unresponsive, repeat dose in other nostril 2-5 minutes after first dose. 1 kit 0    folic acid 1 MG Oral Tab Take 1 tablet (1 mg total) by mouth daily. 30 tablet 2    dexamethasone (DECADRON) 4 MG tablet Take 1 tablet (4 mg total) by mouth 2 (two) times daily with meals. 60 tablet 1    Multiple Vitamins-Minerals (MULTIVITAMIN ADULT OR) Take by mouth. Ergocalciferol (VITAMIN D OR) Take by mouth. REVIEW OF SYSTEMS:  Comprehensive review of systems completed and negative with the exception of aforementioned information in the HPI. PHYSICAL EXAM:  /72   There is no height or weight on file to calculate BMI.   Wt Readings from Last 6 Encounters:  07/27/23 : 213 lb 13.5 oz (97 kg)  07/22/23 : 210 lb 1.6 oz (95.3 kg)  03/02/22 : 197 lb (89.4 kg)  02/10/22 : 198 lb (89.8 kg)  02/02/22 : 180 lb (81.6 kg)  08/31/21 : 215 lb (97.5 kg)       General: Well developed, well nourished, in no acute distress. Ambulates with a cane. Incision: Clean, dry, and intact. No erythema, warmth, swelling, or gross drainage appreciated. Staples intact. HEENT: Normocephalic, atraumatic. Respirations: Non-labored     Neurologic / Musculoskeletal: Awake, alert, oriented and interactive. Recent and remote memory appear intact. Attention span and concentration are appropriate. No dysarthria. Appropriately names objects. Coordination and motor control grossly intact. Patient follows commands briskly and appropriately. No pronator drift. Cervical Spine:    Motor/ Extremities   Deltoid Biceps Triceps    Sensation   R 5/5 5/5 5/5 5/5 Intact to light touch   L 5/5 5/5 5/5 5/5 Intact to light touch       Lumbar Spine:    Motor / Extremities   Hip   flexion Knee   extension Dorsiflexion Plantarflexion   Sensation   R 5/5 5/5 5/5 5/5 Intact to light touch   L 5/5 5/5 5/5 5/5 Intact to light touch       Cranial Nerves: I Smell not tested   II Right homonomous hemianopsia. Pupils equal, round, reactive to light and accommodating bilaterally.    III, VII No ptosis appreciated   III, IV, VI EOMs intact with full ROM   V Facial sensation intact to light touch   VII No facial asymmetry   VIII Hearing normal to voice   IX, X Soft palate elevation and gag reflex not tested   XI Spinal accessory muscles intact with 5/5 strength bilaterally   XII Tongue strength normal, midline, without fasciculations or deviations       IMAGING:  No new neurosurgical imaging to review today     ASSESSMENT / PLAN:  (D31.924) Postoperative state  (primary encounter diagnosis)  (Z48.89) Encounter for postoperative wound check  (Z48.02) Encounter for staple removal  (J27.177) Status post stereotactic brain biopsy  (H53.461) Right homonymous hemianopsia       [place] returns to the clinic today approximately 2 weeks s/p left sided stereotactic brain mass biopsy with Dr. Pb Woods on 7/27/2023. The patient is doing well overall from a surgical standpoint. She continues to endorse visual disturbances, but these remain unchanged since discharge from the hospital.  No new neurologic complaints. On exam, she has a right homonymous hemianopsia. Her incision is healing well. No signs of infection. Staples were removed in the office today without complication. Patient tolerated staple removal well. Incision left open to air. Patient encouraged to follow-up with oncology per their recommendations for discussion and initiation of her treatment plan going forward. Advised her and her  to be aware of signs/symptoms concerning for sepsis or wound infection and to reach out to our office with any questions or concerns. Advised her to avoid cutting her hair for a few weeks to allow the incision to heal. When she does get it cut, she should advise the stylist/hairdresser to avoid the incision and to not cut near the incision. She demonstrated good understanding and was agreeable. -Medications Prescribed: none   -Imaging Ordered: none  -Referrals Placed: none  -Follow up: 6 weeks postop with Dr. Pb Woods    -Follow up with oncology     Plan was reviewed and discussed in detail with the patient. Patient encouraged to call the office with any questions or concerns of new/worsening neurologic symptoms. Patient demonstrated good understanding and was agreeable with the plan. Visit time: 25 minutes   Over 50% of that time was spent providing patient education and discussing care plan.     Geralynn Hamman, PA-C  Physician Assistant- Neurosurgery   [facility]  8/8/2023, 2:45 PM Breath Sounds equal & clear to percussion & auscultation, no accessory muscle use

## 2023-08-24 RX ORDER — PSEUDOEPHED/ACETAMINOPH/DIPHEN 30MG-500MG
1 TABLET ORAL EVERY 6 HOURS PRN
Qty: 100 TABLET | Refills: 0 | Status: SHIPPED | OUTPATIENT
Start: 2023-08-24

## 2023-08-24 NOTE — TELEPHONE ENCOUNTER
Medication: Acetaminophen 500MG     Date of last refill: 7/28/23 (#160/0)  [place]      Last office visit: 8/8/23  Due back to clinic per last office note:  6 weeks  Date next office visit scheduled:    Future Appointments   Date Time Provider Ya Beyer   9/5/2023  1:15 PM Katie James PA-C [facility] [address] [facility]   9/6/2023  9:20 AM Griselda Crew, MD [facility] [address] note recommendation:  \" Sultana Thompson returns to the clinic today approximately 2 weeks s/p left sided stereotactic brain mass biopsy with Dr. Gilda Calvert on 7/27/2023. The patient is doing well overall from a surgical standpoint. She continues to endorse visual disturbances, but these remain unchanged since discharge from the hospital.  No new neurologic complaints. On exam, she has a right homonymous hemianopsia. Her incision is healing well. No signs of infection. Staples were removed in the office today without complication. Patient tolerated staple removal well. Incision left open to air. Patient encouraged to follow-up with oncology per their recommendations for discussion and initiation of her treatment plan going forward. Advised her and her  to be aware of signs/symptoms concerning for sepsis or wound infection and to reach out to our office with any questions or concerns. Advised her to avoid cutting her hair for a few weeks to allow the incision to heal. When she does get it cut, she should advise the stylist/hairdresser to avoid the incision and to not cut near the incision. She demonstrated good understanding and was agreeable.  \"

## 2023-09-06 ENCOUNTER — OFFICE VISIT (OUTPATIENT)
Dept: SURGERY | Facility: CLINIC | Age: 68
End: 2023-09-06
Payer: MEDICARE

## 2023-09-06 VITALS — SYSTOLIC BLOOD PRESSURE: 120 MMHG | DIASTOLIC BLOOD PRESSURE: 70 MMHG

## 2023-09-06 DIAGNOSIS — Z98.890 POSTOPERATIVE STATE: ICD-10-CM

## 2023-09-06 DIAGNOSIS — C71.9 GBM (GLIOBLASTOMA MULTIFORME) (HCC): ICD-10-CM

## 2023-09-06 DIAGNOSIS — Z98.890 STATUS POST STEREOTACTIC BRAIN BIOPSY: Primary | ICD-10-CM

## 2023-09-06 PROBLEM — M81.0 AGE-RELATED OSTEOPOROSIS WITHOUT CURRENT PATHOLOGICAL FRACTURE: Status: ACTIVE | Noted: 2022-08-30

## 2023-09-06 PROBLEM — M54.2 ACUTE NECK PAIN: Status: ACTIVE | Noted: 2022-08-30

## 2023-09-06 PROBLEM — H43.813 PVD (POSTERIOR VITREOUS DETACHMENT), BILATERAL: Status: ACTIVE | Noted: 2022-11-01

## 2023-09-06 NOTE — PROGRESS NOTES
Neurosurgery staff    Patient seen and examined. Please also see ANGELIC Jones note for further information. Pulm, the patient is doing well. She is undergoing radiation therapy with adjuvant temozolomide at duly. She continues to have a visual field cut and some disequilibrium. No new complaints. The incision is well-healed. We spent some time discussing her clinical course, as well as activity recommendations. All questions were answered. I can see her back once the next MRI scan has been completed.

## 2023-09-06 NOTE — PROGRESS NOTES
Patient: Sarthak Pagan  Medical Record Number: [id number]  YOB: 1955  PCP: Lydia Collins DO    Reason for visit: Status post left-sided stereotactic biopsy, postop visit    HISTORY OF CHIEF COMPLAINT:    Sarthak Pagan is a very pleasant 79year old female who presents today for: Status post left-sided stereotactic biopsy, postop visit  Status post 7/27/2023: Left-sided stereotactic biopsy with Dr. Sophie Shea  The patient reports no new neurologic changes since her stereotactic biopsy. She is continued short-term memory issues as well as visual complaints. She has started her radiation treatment. No incisional issues. The patient endorses swelling of the left knee. No calf pain or redness. The swelling has been ongoing for greater than a year. History of knee surgery to the left. Last hx: 8/8/23  HISTORY OF PRESENTING ILLNESS:  Sarthak Pagan is a pleasant 79year old female who returns to the neurosurgery clinic today approximately 2 weeks s/p left sided stereotactic brain mass biopsy with Dr. Sophie Shea on 7/27/2023. Intraoperative pathology noted to be glioblastoma, CNS WHO grade 4. The patient has been doing well from a surgical standpoint since discharge from the hospital.  She denies any headaches, changes in speech, nausea/vomiting, dizziness/lightheadedness, or weakness in her upper or lower extremities. She continues to endorse difficulty with vision, particularly with objects that are closer to her face. She denies any worsening of her vision since discharge. She has not had any issues with her incision site. She has not had any constitutional symptoms or symptoms concerning for sepsis. She has kept her incision covered since surgery. She inquired about cutting her hair.      Past Medical History:   Diagnosis Date    Abnormality of gait 12/18/2017    BRCA1 negative 2018    BRCA2 negative 2018    Decreased range of motion of left knee 5/11/2018    Decreased strength of lower extremity 9/10/2019    Infected prosthetic knee joint      Muscle weakness     Osteoarthritis     PONV (postoperative nausea and vomiting)     profound nausea and vomiting    Pre-op exam 10/19/2017    Visual impairment     glasses      Past Surgical History:   Procedure Laterality Date    BENIGN BIOPSY LEFT  2018    stereotactic biopsy, benign          GASTRIC BYPASS,OBESE<100CM [id number]      HYSTERECTOMY      Ovaries removed as well - age 45    HYSTEROSCOPY      [address] JT+ANESTHESIA Right 2017    Dr Ramón Larson JT+ANESTHESIA Left 2018    Dr Grace Ya Left 2017    Dr Srini Armas History   Problem Relation Age of Onset    Hypertension Father     Cancer Sister         breast    Breast Cancer Sister 27        age at dx 27    Diabetes Brother     Breast Cancer Sister 35        age at dx 35    Breast Cancer Maternal Grandmother         unknown      Social History    Socioeconomic History      Marital status:     Tobacco Use      Smoking status: Never      Smokeless tobacco: Never    Vaping Use      Vaping Use: Never used    Substance and Sexual Activity      Alcohol use: Not Currently      Drug use: No      Sexual activity: Yes        Partners: Male       [place]                  HIVES   Current Medications:  Current Outpatient Medications   Medication Sig Dispense Refill    Acetaminophen Extra Strength 500 MG Oral Tab Take 1 tablet by mouth every 6 (six) hours as needed. 100 tablet 0    oxyCODONE 5 MG Oral Tab Take 1 tablet (5 mg total) by mouth every 6 (six) hours as needed for Pain. 12 tablet 0    Naloxone HCl 4 MG/0.1ML Nasal Liquid 4 mg by Nasal route as needed. If patient remains unresponsive, repeat dose in other nostril 2-5 minutes after first dose. 1 kit 0    folic acid 1 MG Oral Tab Take 1 tablet (1 mg total) by mouth daily.  30 tablet 2    dexamethasone (DECADRON) 4 MG tablet Take 1 tablet (4 mg total) by mouth 2 (two) times daily with meals. 60 tablet 1    Multiple Vitamins-Minerals (MULTIVITAMIN ADULT OR) Take by mouth. Ergocalciferol (VITAMIN D OR) Take by mouth. REVIEW OF SYSTEMS   Comprehensive review of systems done. Negative except what is outlined in the above HPI. PHYSICAL EXAMIMATION    blood pressure is 120/70. GENERAL: Very pleasant patient is in no apparent distress. Sitting comfortably in the examination chair. HEENT: Normocephalic, atraumatic. RESPIRATORY RATE: Easy and Even  SKIN: Warm and dry  Bilateral lower extremities without calf pain to palpation. No significant pitting edema or erythema. NEURO: Awake, alert and orientated. Speech fluent, comprehension intact, answering questions appropriately. Patient with poor peripheral vision bilaterally, right superior quadrant most prominently. Pupils equal round and reactive to light. EOMs intact. Face appears symmetric. Tongue midline, motility intact. Able to shrug shoulders. Negative drift. Rapid altering movements are intact. Finger-nose is intact. SPINE:  Gait/Coordination: Gait deferred. Upper Extremity Strength:     Deltoid  Biceps  Triceps    Intrinsics      Right 5 5 5 5 5     Left 5 5 5 5 5     Lower Extremity Strength:     Iliopsoas    D-flexion   Right       5         5   Left       5         5     Incision: CDI without edema, erythema or discharge. Healing well.     DATA:   Specimen to Pathology Tissue: [id number]  Order: [id number]  Collected 7/27/2023  9:04 AM       Status: Final result       Visible to patient: Yes (not seen)    0 Result Notes        Component  Ref Range & Units      Case Report     Surgical Pathology                                Case: [id number]                                     Authorizing Provider:  Dru Art,   Collected:           07/27/2023 09:04 AM                                    MD Ordering Location:     [facility]          Received:            07/27/2023 09:11 AM                                    Operating Room                                                                 Pathologist:           Genna Almaraz MD                                                             Specimens:   A) - Brain, 1. left parietal occipital brain lesion- FROZEN                                            B) - Brain, 2.left parietal occipital brain lesion- permament                                  Final Diagnosis:        A. Left parietal-occipital brain lesion; stereotactic biopsy (frozen section):  Fragments of Glioblastoma, CNS WHO grade 4 (0.8 cm in maximum dimension in aggregate). B. Left parietal-occipital brain lesion; stereotactic biopsy:  Fragments of Glioblastoma, CNS WHO grade 4 (1.8 cm in maximum dimension in aggregate). Comment:  Histologic level sections of the left parietal occipital brain mass (slides A1 and B1) and associated touch preparation smears (specimens A and B only) demonstrate multifocal prominent areas of tumor cellularity comprised of markedly atypical poorly differentiated polygonal, fusiform, round, and few pleomorphic-appearing cells characterized by ovoid to convoluted nuclear contours, coarse hyperchromatic chromatin, small to occasional large conspicuous nucleoli, and variably abundant eosinophilic granular to fibrillary cytoplasm. Scattered areas of microvascular proliferation with glomeruloid structures, single cell apoptosis, atypical mitotic figures, and focally prominent areas of coagulative necrosis are seen in association with tumor cell infiltrates.      Due to the poorly differentiated nature of the tumor, immunohistochemical analysis of tumor cells was performed on formalin-fixed paraffin-embedded tissue sections (block B1) with each antibody performed on separate slides and appropriately functioning control material for each antibody assayed. This immunohistochemical analysis was performed to differentiate between high-grade glioma, metastatic carcinoma, and metastatic melanoma. From this analysis, tumor cells demonstrate strong diffuse cytoplasmic expression of GFAP and S100. In addition, scattered tumor cells show strong nuclear expression of the proliferation marker Ki-67 (up to 22% of tumor cells on 500-cell differential in areas of greatest nuclear staining). Tumor cells show scattered focal areas of cytokeratin AE1/AE3 expression at the peripheral edge tumor fragments (cytokeratin AE1/AE3 may cross-react with glial filaments); however, diffuse strong tumor expression is not present. Tumor cells are uniformly negative for Melan-A expression. Clinically, the patient is a 82-year-old female who presented with impaired memory and vision disturbances. MRI of the brain (7/24/2023) showed multifocal heterogeneously enhancing left cerebral hemispheric mass lesions in the left parietal, occipital, and posterior temporal lobes with surrounding moderate to severe vasogenic edema. These mass lesions were reported to range in size from 2.2 cm to 4.1 cm with the largest mass lesion identified in the left occipital lobe. Overall, the morphologic and immunophenotypic findings of this case are consistent with a Glioblastoma, CNS WHO grade 4. Recommend correlation with pending IDH1 and IDH2 mutation and MGMT promoter methylation PCR analyses for further characterization of these findings. The results of these analyses will be available in future linked reports. For  purposes, a second pathologist has reviewed the case and concurs with the above diagnostic interpretations. Case findings were discussed with Dr. Charolett Simmonds (intraoperatively) on 7/27/2023 at  9:24 a.m.          Electronically signed by Janis Arriaga MD on 7/28/2023 at 12:08 PM        Intraoperative consultation Left parietal occipital brain lesion; FSA and TPA:  Malignant neoplasm showing morphologic features most compatible with a high grade glioma/glial tumor. Defer to permanent section evaluation for the final diagnostic interpretation. Clinical Information      Brain Tumor. Gross Description      Specimen A is submitted without fixative labeled \"Zoila Jimenez, left parietal occipital brain lesion - frozen\" and consists of three irregular fragments of tan-pink to white, soft tissue measuring in aggregate 0.8 x 0.5 x 0.2 cm. A portion of the specimen is excised and utilized for making touch preparation/squash preparation smears for intraoperative interpretation. The remainder of the specimen is submitted entirely for frozen section. The frozen section and touch preparation diagnosis per Dr. Bib Andrade as conveyed to Dr. Klaus Zhang is: \"Malignant neoplasm showing morphologic features most compatible with a high grade glioma/glial tumor. Defer to permanent section evaluation for the final diagnostic interpretation. \" The frozen section residue is submitted in its entirety in a single white cassette labeled A. The specimen was submitted to surgical pathology on 7/27/2023 at 9:13 a.m. The frozen section and touch preparation diagnosis was rendered at 9:24 a.m. Specimen B is labeled \"Zoila Jimenez, left parietal occipital brain lesion\" received in formalin. The specimen consists of multiple fragments of pink-red soft tissue measuring in aggregate 1.8 x 0.9 x 0.2 cm. The specimen is submitted entirely in cassette B1. (montanaq)       Ervin Wilder M.D./[facility]      Interpretation     Malignant Abnormal           IMAGING:   No recent imaging    MEDICAL DECISION MAKING:     ASSESSMENT and PLAN:  (G23.968) Status post stereotactic brain biopsy  (primary encounter diagnosis)    (Z98.890) Postoperative state    (C71.9) GBM [facility]iform[facility]    PLAN:   1. Medication: None prescribed  2.  Imaging:    - Reviewed today:    -No recent imaging   - Ordered today:    -None, will defer next imaging interval recommendations per radiation oncology  3. PCP:   -Recommend follow-up for left knee edema, onset greater than 1 year prior. History of left knee surgery. 4. Follow up for MRI review with Dr Gilda Calvert or call or follow up sooner or go to the ED for any new, worsening or concerning signs or symptoms     Dr. Gilda Calvert and SHARRON reviewed imaging and discussed the plan. Dr. Gilda Calvert agrees with the plan. Dr. Gilda Calvert and SHARRON reviewed imaging and discussed the plan with the patient. The patient agrees with the plan, verbalized understanding and is appreciative. All questions were sought out and thoroughly answered to satisfaction. Total visit time: 30 minutes  More than 50% spent coordinating care, providing patient education, discussing follow-up with PCP, discussing further imaging, and counseling. Jonah Huang M.S., [facility] [address], [address] [facility], [address] [place] [address]  [phone]  9/6/2023 2:57 PM    Dragon speech recognition software was used to prepare this note. If a word or phrase is confusing, it is likely due to a failure of recognition. Please contact me with any questions or clarifications.

## 2023-11-06 ENCOUNTER — APPOINTMENT (OUTPATIENT)
Dept: CT IMAGING | Facility: HOSPITAL | Age: 68
End: 2023-11-06
Attending: EMERGENCY MEDICINE
Payer: MEDICARE

## 2023-11-06 ENCOUNTER — HOSPITAL ENCOUNTER (INPATIENT)
Facility: HOSPITAL | Age: 68
LOS: 1 days | Discharge: HOME | End: 2023-11-07
Attending: EMERGENCY MEDICINE | Admitting: INTERNAL MEDICINE
Payer: MEDICARE

## 2023-11-06 DIAGNOSIS — R29.6 FREQUENT FALLS: Primary | ICD-10-CM

## 2023-11-06 DIAGNOSIS — S06.35AA TRAUMATIC LEFT-SIDED INTRACEREBRAL HEMORRHAGE WITH UNKNOWN LOSS OF CONSCIOUSNESS STATUS, INITIAL ENCOUNTER (HCC): ICD-10-CM

## 2023-11-06 DIAGNOSIS — C71.9 GBM (GLIOBLASTOMA MULTIFORME) (HCC): ICD-10-CM

## 2023-11-06 PROBLEM — Z98.890 HISTORY OF BURR HOLE SURGERY: Status: ACTIVE | Noted: 2023-11-06

## 2023-11-06 PROBLEM — R47.01 EXPRESSIVE APHASIA: Status: ACTIVE | Noted: 2023-11-06

## 2023-11-06 PROBLEM — G93.6 CEREBRAL EDEMA (HCC): Status: ACTIVE | Noted: 2023-11-06

## 2023-11-06 LAB
ALBUMIN SERPL-MCNC: 3.9 G/DL (ref 3.2–4.8)
ALBUMIN/GLOB SERPL: 1.3 {RATIO} (ref 1–2)
ALP LIVER SERPL-CCNC: 68 U/L
ALT SERPL-CCNC: 23 U/L
ANION GAP SERPL CALC-SCNC: 9 MMOL/L (ref 0–18)
AST SERPL-CCNC: 21 U/L (ref ?–34)
BASOPHILS # BLD AUTO: 0.03 X10(3) UL (ref 0–0.2)
BASOPHILS NFR BLD AUTO: 0.5 %
BILIRUB SERPL-MCNC: 1 MG/DL (ref 0.2–1.1)
BILIRUB UR QL: NEGATIVE
BUN BLD-MCNC: 7 MG/DL (ref 9–23)
BUN/CREAT SERPL: 7.6 (ref 10–20)
CALCIUM BLD-MCNC: 8.9 MG/DL (ref 8.7–10.4)
CHLORIDE SERPL-SCNC: 107 MMOL/L (ref 98–112)
CLARITY UR: CLEAR
CO2 SERPL-SCNC: 26 MMOL/L (ref 21–32)
COLOR UR: YELLOW
CREAT BLD-MCNC: 0.92 MG/DL
DEPRECATED RDW RBC AUTO: 61.3 FL (ref 35.1–46.3)
EGFRCR SERPLBLD CKD-EPI 2021: 69 ML/MIN/1.73M2 (ref 60–?)
EOSINOPHIL # BLD AUTO: 0 X10(3) UL (ref 0–0.7)
EOSINOPHIL NFR BLD AUTO: 0 %
ERYTHROCYTE [DISTWIDTH] IN BLOOD BY AUTOMATED COUNT: 18.2 % (ref 11–15)
GLOBULIN PLAS-MCNC: 3.1 G/DL (ref 2.8–4.4)
GLUCOSE BLD-MCNC: 102 MG/DL (ref 70–99)
GLUCOSE BLDC GLUCOMTR-MCNC: 112 MG/DL (ref 70–99)
GLUCOSE UR-MCNC: NORMAL MG/DL
HCT VFR BLD AUTO: 42.5 %
HGB BLD-MCNC: 12.7 G/DL
HGB UR QL STRIP.AUTO: NEGATIVE
IMM GRANULOCYTES # BLD AUTO: 0.09 X10(3) UL (ref 0–1)
IMM GRANULOCYTES NFR BLD: 1.5 %
KETONES UR-MCNC: 10 MG/DL
LEUKOCYTE ESTERASE UR QL STRIP.AUTO: NEGATIVE
LYMPHOCYTES # BLD AUTO: 1.55 X10(3) UL (ref 1–4)
LYMPHOCYTES NFR BLD AUTO: 25.2 %
MCH RBC QN AUTO: 27.3 PG (ref 26–34)
MCHC RBC AUTO-ENTMCNC: 29.9 G/DL (ref 31–37)
MCV RBC AUTO: 91.4 FL
MONOCYTES # BLD AUTO: 0.67 X10(3) UL (ref 0.1–1)
MONOCYTES NFR BLD AUTO: 10.9 %
NEUTROPHILS # BLD AUTO: 3.81 X10 (3) UL (ref 1.5–7.7)
NEUTROPHILS # BLD AUTO: 3.81 X10(3) UL (ref 1.5–7.7)
NEUTROPHILS NFR BLD AUTO: 61.9 %
NITRITE UR QL STRIP.AUTO: NEGATIVE
OSMOLALITY SERPL CALC.SUM OF ELEC: 292 MOSM/KG (ref 275–295)
PH UR: 5.5 [PH] (ref 5–8)
PLATELET # BLD AUTO: 219 10(3)UL (ref 150–450)
POTASSIUM SERPL-SCNC: 3.2 MMOL/L (ref 3.5–5.1)
PROT SERPL-MCNC: 7 G/DL (ref 5.7–8.2)
PROT UR-MCNC: NEGATIVE MG/DL
RBC # BLD AUTO: 4.65 X10(6)UL
SODIUM SERPL-SCNC: 142 MMOL/L (ref 136–145)
SP GR UR STRIP: 1.01 (ref 1–1.03)
UROBILINOGEN UR STRIP-ACNC: NORMAL
WBC # BLD AUTO: 6.2 X10(3) UL (ref 4–11)

## 2023-11-06 PROCEDURE — 99222 1ST HOSP IP/OBS MODERATE 55: CPT | Performed by: STUDENT IN AN ORGANIZED HEALTH CARE EDUCATION/TRAINING PROGRAM

## 2023-11-06 PROCEDURE — 70450 CT HEAD/BRAIN W/O DYE: CPT | Performed by: EMERGENCY MEDICINE

## 2023-11-06 RX ORDER — DEXAMETHASONE SODIUM PHOSPHATE 4 MG/ML
4 VIAL (ML) INJECTION EVERY 6 HOURS
Status: DISCONTINUED | OUTPATIENT
Start: 2023-11-06 | End: 2023-11-07

## 2023-11-06 RX ORDER — ONDANSETRON 2 MG/ML
4 INJECTION INTRAMUSCULAR; INTRAVENOUS EVERY 6 HOURS PRN
Status: DISCONTINUED | OUTPATIENT
Start: 2023-11-06 | End: 2023-11-07

## 2023-11-06 RX ORDER — LEVETIRACETAM 500 MG/5ML
500 INJECTION, SOLUTION, CONCENTRATE INTRAVENOUS EVERY 12 HOURS
Status: DISCONTINUED | OUTPATIENT
Start: 2023-11-06 | End: 2023-11-07

## 2023-11-06 RX ORDER — FOLIC ACID 1 MG/1
1 TABLET ORAL DAILY
Status: DISCONTINUED | OUTPATIENT
Start: 2023-11-06 | End: 2023-11-07

## 2023-11-06 RX ORDER — SODIUM CHLORIDE 9 MG/ML
INJECTION, SOLUTION INTRAVENOUS CONTINUOUS
Status: DISCONTINUED | OUTPATIENT
Start: 2023-11-06 | End: 2023-11-07

## 2023-11-06 RX ORDER — DEXAMETHASONE SODIUM PHOSPHATE 4 MG/ML
4 VIAL (ML) INJECTION EVERY 12 HOURS
Status: DISCONTINUED | OUTPATIENT
Start: 2023-11-06 | End: 2023-11-06

## 2023-11-06 RX ORDER — ALENDRONATE SODIUM 70 MG/1
70 TABLET ORAL
COMMUNITY
Start: 2023-06-19

## 2023-11-06 RX ORDER — OXYCODONE HYDROCHLORIDE 5 MG/1
5 TABLET ORAL EVERY 6 HOURS PRN
Status: DISCONTINUED | OUTPATIENT
Start: 2023-11-06 | End: 2023-11-07

## 2023-11-06 RX ORDER — METOCLOPRAMIDE HYDROCHLORIDE 5 MG/ML
10 INJECTION INTRAMUSCULAR; INTRAVENOUS EVERY 8 HOURS PRN
Status: DISCONTINUED | OUTPATIENT
Start: 2023-11-06 | End: 2023-11-07

## 2023-11-06 RX ORDER — FUROSEMIDE 20 MG/1
20 TABLET ORAL DAILY
COMMUNITY
Start: 2023-10-05 | End: 2024-05-02

## 2023-11-06 RX ORDER — ACETAMINOPHEN 500 MG
500 TABLET ORAL EVERY 4 HOURS PRN
Status: DISCONTINUED | OUTPATIENT
Start: 2023-11-06 | End: 2023-11-07

## 2023-11-06 NOTE — ED INITIAL ASSESSMENT (HPI)
Per  \"11am she slipped on the floor- unwitnessed. She was in a daze wasn't talking that much which is abnormal for her. I found her on the floor she was awake. She had an unwitnessed fall tonight around 11pm as well she was awake. She fell out of the bed. Her speech got worse after her second fall. She has stage 4 cancer to the brain\". Denies taking blood thinners. Per  she has short term memory loss.  reports \"She can't hold her urine which is not normal for her\". LKW: 11/4/23 unknown time.

## 2023-11-06 NOTE — CM/SW NOTE
11/06/23 1200   CM/SW Screening   Referral [id number] staff; Chart review;Nursing rounds   Patient's Current Mental Status at Time of Assessment Memory Impairments   Patient's [address]   Number of Levels in Home 1   Patient lives with Spouse/Significant other   Patient Status Prior to Admission   Independent with ADLs and Mobility No   Pt. requires assistance with Housework;Driving;Meals; Ambulating   Services in place prior to admission DME/Supplies at home   Type of DME/Supplies Straight [employer]ane; [facility]; Wheelchair     CM met with pt to initiate dc planning. Pt alert, having trouble with word finding but able to answer most questions with some cues. Pt is agreeable to [facility] if rec. PT/OT eval pending. CM sent tent [facility]  ref and completed f2f for RN/PT/OT. Cm called spouse who confirmed address and PCP and confirmed above assessment details are correct. His plan is for pt to dc to home address. 11/7 0830  PT/OT recs undetermined. No neurosx interventions rec. CM sent tentative [facility] ref, PASRR done. 1100  Per RN in dc rounds pt is med cleared for dc, PT/OT to re-eval for dc recs. CM reserved [facility] as they were the only available agency to reply to ref. [id number]  [facility] was notified by RN that PT has cleared pt for dc home, rec is home PT. CM called and spoke with spouse. He is aware of rec however, pt is declining services at this time. CM added agency info to AVS and advised spouse to have MD call to set up if they change their mind after dc. Spouse vu.    CM notified [facility] of above, CM requested f/u call in a day or 2 to confirm services are still not needed. Plan  Home    / to remain available for support and/or discharge planning.      Paras Gaytan, RN    Ext [phone]

## 2023-11-06 NOTE — PLAN OF CARE
Patient is alert, oriented to self. Delayed responses. Room air. Remote tele in place. Vital signs stable. Voiding via purewick. IVF infusing. IV Keppra, IV Decadron given per orders. Tolerating general diet, denies nausea/vomiting. Awaiting MRI of the brain. Problem: Patient Centered Care  Goal: Patient preferences are identified and integrated in the patient's plan of care  Description: Interventions:  - What would you like us to know as we care for you? I love partha.   - Provide timely, complete, and accurate information to patient/family  - Incorporate patient and family knowledge, values, beliefs, and cultural backgrounds into the planning and delivery of care  - Encourage patient/family to participate in care and decision-making at the level they choose  - Honor patient and family perspectives and choices  Outcome: Progressing     Problem: SAFETY ADULT - FALL  Goal: Free from fall injury  Description: INTERVENTIONS:  - Assess pt frequently for physical needs  - Identify cognitive and physical deficits and behaviors that affect risk of falls.   - Cecil fall precautions as indicated by assessment.  - Educate pt/family on patient safety including physical limitations  - Instruct pt to call for assistance with activity based on assessment  - Modify environment to reduce risk of injury  - Provide assistive devices as appropriate  - Consider OT/PT consult to assist with strengthening/mobility  - Encourage toileting schedule  Outcome: Progressing     Problem: DISCHARGE PLANNING  Goal: Discharge to home or other facility with appropriate resources  Description: INTERVENTIONS:  - Identify barriers to discharge w/pt and caregiver  - Include patient/family/discharge partner in discharge planning  - Arrange for needed discharge resources and transportation as appropriate  - Identify discharge learning needs (meds, wound care, etc)  - Arrange for interpreters to assist at discharge as needed  - Consider post-discharge preferences of patient/family/discharge partner  - Complete POLST form as appropriate  - Assess patient's ability to be responsible for managing their own health  - Refer to Case Management Department for coordinating discharge planning if the patient needs post-hospital services based on physician/LIP order or complex needs related to functional status, cognitive ability or social support system  Outcome: Progressing     Problem: GASTROINTESTINAL - ADULT  Goal: Minimal or absence of nausea and vomiting  Description: INTERVENTIONS:  - Maintain adequate hydration with IV or PO as ordered and tolerated  - Nasogastric tube to low intermittent suction as ordered  - Evaluate effectiveness of ordered antiemetic medications  - Provide nonpharmacologic comfort measures as appropriate  - Advance diet as tolerated, if ordered  - Obtain nutritional consult as needed  - Evaluate fluid balance  Outcome: Progressing  Goal: Maintains or returns to baseline bowel function  Description: INTERVENTIONS:  - Assess bowel function  - Maintain adequate hydration with IV or PO as ordered and tolerated  - Evaluate effectiveness of GI medications  - Encourage mobilization and activity  - Obtain nutritional consult as needed  - Establish a toileting routine/schedule  - Consider collaborating with pharmacy to review patient's medication profile  Outcome: Progressing  Goal: Maintains adequate nutritional intake (undernourished)  Description: INTERVENTIONS:  - Monitor percentage of each meal consumed  - Identify factors contributing to decreased intake, treat as appropriate  - Assist with meals as needed  - Monitor I&O, WT and lab values  - Obtain nutritional consult as needed  - Optimize oral hygiene and moisture  - Encourage food from home; allow for food preferences  - Enhance eating environment  Outcome: Progressing

## 2023-11-06 NOTE — PHYSICAL THERAPY NOTE
PHYSICAL THERAPY EVALUATION - INPATIENT     Room Number: 465/465-A  Evaluation Date: 11/6/2023  Type of Evaluation: Initial   Physician Order: PT Eval and Treat    Presenting Problem: frequent falls, worsening speech  Co-Morbidities : history of grade 4 glioblastoma  Reason for Therapy: Mobility Dysfunction and Discharge Planning    PHYSICAL THERAPY ASSESSMENT     Patient is a 79year old female admitted 11/6/2023 for frequent falls, worsening speech. PMH significant for glioblastoma. Patient's current functional deficits include impaired cognition, bed mobility, transfers, gait, balance, coordination, motor planning, R sided inattention, word finding, and tolerance for activity, which are below the patient's pre-admission status. Patient in bed upon arrival. RN approved activity. Educated patient on POC and benefits of mobilization. Agreeable to participate. Patient reporting no pain. Patient with word finding difficulties - patient with good insight into deficit, reporting that she has felt confused for approx 5 weeks. Patient presents with R sided neglect, requiring significant cues for patient to turn head towards R side. Bed Mobility: Mod A x 2 supine>EOB. Patient tolerated sitting EOB approx 5 minutes, requiring BUE support and CGA>SBA in order to maintain static sitting balance. Transfers: Mod A x 2 SPT EOB>chair with gait belt; tactile and verbal cues provided for appropriate UE placement during functional transfers. Max A x 2 sit<>stand with RW from bedside chair. Tolerated static standing with BUE support on RW and Mod A for approx 1 minute. Patient able to  LLE off of ground, but unable to  RLE. Cues provided for proper hand placement on RW. Patient sitting in bedside chair at end of session. Needs in reach and alarm activated. RN aware.       The patient's Approx Degree of Impairment: 76.75% has been calculated based on documentation in the [facility] '6 clicks' Inpatient Basic Mobility Short Form. Research supports that patients with this level of impairment may benefit from LTAC, however, recommendation is currently undetermined pending medical plan of care and family assistance. Will update recommendation as patient progresses. Patient will benefit from continued IP PT services to address these deficits in preparation for discharge. DISCHARGE RECOMMENDATIONS  PT Discharge Recommendations: Undetermined    PLAN  PT Treatment Plan: Bed mobility; Body mechanics; Coordination; Endurance; Energy conservation;Patient education;Gait training;Strengthening;Transfer training;Balance training  Rehab Potential : Guarded  Frequency (Obs): 3-5x/week       PHYSICAL THERAPY MEDICAL/SOCIAL HISTORY     Problem List  Principal Problem:    Frequent falls  Active Problems:    GBM (glioblastoma multiforme) (HCC)    Traumatic left-sided intracerebral hemorrhage with unknown loss of consciousness status, initial encounter ([facility] [address]    Expressive aphasia    Cerebral edema ([facility] [address]    History of emilee hole surgery      HOME SITUATION  Home Situation  Type of Home: House  Home Layout: One level  Lives With: Spouse  Patient Owned Equipment: Rolling walker; Wheelchair;Cane     Prior Level of [place]: Patient unreliable historian - reports independent with ADLs, Mod I for functional mobility with use of RW?  Spouse assists with higher level iADLs    SUBJECTIVE  \"I've been confused for 5 weeks\"    PHYSICAL THERAPY EXAMINATION     OBJECTIVE  Precautions: Bed/chair alarm  Fall Risk: High fall risk    PAIN ASSESSMENT  Ratin    COGNITION  Overall Cognitive Status:  Impaired  Attention Span:  attends with cues to redirect  Orientation Level:  oriented to place and oriented to person  Memory:  decreased recall of recent events and decreased short term memory  Following Commands:  follows one-step commands inconsistently  Initiation: cues to initiate tasks and hand over hand to initiate tasks  Motor Planning: impaired  Problem Solving:  assistance required to identify errors made, assistance required to generate solutions, and assistance required to implement solutions    RANGE OF MOTION AND STRENGTH ASSESSMENT  Upper extremity ROM and strength are within functional limits for LUE. Lower extremity ROM is within functional limits   Lower extremity strength is impaired; unable to formally assess due to decreased command following    BALANCE  Static Sitting: Fair  Dynamic Sitting: Fair -  Static Standing: Poor  Dynamic Standing: Poor -    ACTIVITY TOLERANCE  Pulse: 77  Heart Rate Source: Monitor     BP: 126/75    O2 WALK  Oxygen Therapy  SPO2% on Room Air at Rest: 98    AM-PAC '6-Clicks' INPATIENT SHORT FORM - BASIC MOBILITY  How much difficulty does the patient currently have. .. Patient Difficulty: Turning over in bed (including adjusting bedclothes, sheets and blankets)?: A Lot   Patient Difficulty: Sitting down on and standing up from a chair with arms (e.g., wheelchair, bedside commode, etc.): A Lot   Patient Difficulty: Moving from lying on back to sitting on the side of the bed?: A Lot   How much help from another person does the patient currently need. .. Help from Another: Moving to and from a bed to a chair (including a wheelchair)?: A Lot   Help from Another: Need to walk in hospital room?: Total   Help from Another: Climbing 3-5 steps with a railing?: Total     AM-PAC Score:  Raw Score: 10   Approx Degree of Impairment: 76.75%   Standardized Score (AM-PAC Scale): 32.29   CMS Modifier (G-Code): CL    FUNCTIONAL ABILITY STATUS  Functional Mobility/Gait Assessment  Gait Assistance: Not tested    Exercise/Education Provided:  Bed mobility  Body mechanics  Energy conservation  Functional activity tolerated  Posture  Transfer training    Patient End of Session: Up in chair;Needs met;Call light within reach;RN aware of session/findings; All patient questions and concerns addressed; Alarm set    CURRENT GOALS    Goals to be met by: 11/20/23  Patient Goal Patient's self-stated goal is: none stated   Goal #1 Patient is able to demonstrate supine - sit EOB @ level: moderate assistance     Goal #1   Current Status    Goal #2 Patient is able to demonstrate transfers Sit to/from Stand at assistance level: moderate assistance with walker - rolling     Goal #2  Current Status    Goal #3 Patient is able to ambulate 5 feet with assist device: walker - rolling at assistance level: maximum assistance   Goal #3   Current Status    Goal #4 Patient to demonstrate independence with home activity/exercise instructions provided to patient in preparation for discharge.    Goal #4   Current Status      Patient Evaluation Complexity Level:  History High - 3 or more personal factors and/or co-morbidities   Examination of body systems High - addressing a total of 4 or more elements   Clinical Presentation High - Unstable   Clinical Decision Making High Complexity     Therapeutic Activity: 23 minutes

## 2023-11-06 NOTE — ED QUICK NOTES
Orders for admission, patient is aware of plan and ready to go upstairs. Any questions, please call ED RN Jocy at extension [phone].      Patient Covid vaccination status: Fully vaccinated     COVID Test Ordered in ED: None    COVID Suspicion at Admission: N/A    Running Infusions:  None    Mental Status/LOC at time of transport: Aox1-2    Other pertinent information: pending UA  CIWA score: N/A   NIH score:  N/A

## 2023-11-07 VITALS
RESPIRATION RATE: 17 BRPM | BODY MASS INDEX: 37 KG/M2 | WEIGHT: 208.81 LBS | OXYGEN SATURATION: 95 % | TEMPERATURE: 98 F | DIASTOLIC BLOOD PRESSURE: 74 MMHG | HEART RATE: 68 BPM | SYSTOLIC BLOOD PRESSURE: 107 MMHG | HEIGHT: 63 IN

## 2023-11-07 LAB
ANION GAP SERPL CALC-SCNC: 8 MMOL/L (ref 0–18)
BASOPHILS # BLD AUTO: 0.01 X10(3) UL (ref 0–0.2)
BASOPHILS NFR BLD AUTO: 0.2 %
BUN BLD-MCNC: 9 MG/DL (ref 9–23)
BUN/CREAT SERPL: 14.1 (ref 10–20)
CALCIUM BLD-MCNC: 8.3 MG/DL (ref 8.7–10.4)
CHLORIDE SERPL-SCNC: 112 MMOL/L (ref 98–112)
CO2 SERPL-SCNC: 22 MMOL/L (ref 21–32)
CREAT BLD-MCNC: 0.64 MG/DL
DEPRECATED RDW RBC AUTO: 58.9 FL (ref 35.1–46.3)
EGFRCR SERPLBLD CKD-EPI 2021: 97 ML/MIN/1.73M2 (ref 60–?)
EOSINOPHIL # BLD AUTO: 0 X10(3) UL (ref 0–0.7)
EOSINOPHIL NFR BLD AUTO: 0 %
ERYTHROCYTE [DISTWIDTH] IN BLOOD BY AUTOMATED COUNT: 17.4 % (ref 11–15)
GLUCOSE BLD-MCNC: 173 MG/DL (ref 70–99)
HCT VFR BLD AUTO: 36.7 %
HGB BLD-MCNC: 11.2 G/DL
IMM GRANULOCYTES # BLD AUTO: 0.04 X10(3) UL (ref 0–1)
IMM GRANULOCYTES NFR BLD: 0.8 %
LYMPHOCYTES # BLD AUTO: 1.24 X10(3) UL (ref 1–4)
LYMPHOCYTES NFR BLD AUTO: 24.6 %
MCH RBC QN AUTO: 27.7 PG (ref 26–34)
MCHC RBC AUTO-ENTMCNC: 30.5 G/DL (ref 31–37)
MCV RBC AUTO: 90.6 FL
MONOCYTES # BLD AUTO: 0.19 X10(3) UL (ref 0.1–1)
MONOCYTES NFR BLD AUTO: 3.8 %
NEUTROPHILS # BLD AUTO: 3.56 X10 (3) UL (ref 1.5–7.7)
NEUTROPHILS # BLD AUTO: 3.56 X10(3) UL (ref 1.5–7.7)
NEUTROPHILS NFR BLD AUTO: 70.6 %
OSMOLALITY SERPL CALC.SUM OF ELEC: 297 MOSM/KG (ref 275–295)
PLATELET # BLD AUTO: 172 10(3)UL (ref 150–450)
POTASSIUM SERPL-SCNC: 4 MMOL/L (ref 3.5–5.1)
POTASSIUM SERPL-SCNC: 4 MMOL/L (ref 3.5–5.1)
RBC # BLD AUTO: 4.05 X10(6)UL
SODIUM SERPL-SCNC: 142 MMOL/L (ref 136–145)
WBC # BLD AUTO: 5 X10(3) UL (ref 4–11)

## 2023-11-07 PROCEDURE — 99232 SBSQ HOSP IP/OBS MODERATE 35: CPT | Performed by: STUDENT IN AN ORGANIZED HEALTH CARE EDUCATION/TRAINING PROGRAM

## 2023-11-07 RX ORDER — TEMOZOLOMIDE 5 MG/1
CAPSULE ORAL
COMMUNITY
Start: 2023-09-14

## 2023-11-07 RX ORDER — DEXAMETHASONE 2 MG/1
2 TABLET ORAL EVERY 12 HOURS SCHEDULED
Status: DISCONTINUED | OUTPATIENT
Start: 2023-11-07 | End: 2023-11-07

## 2023-11-07 RX ORDER — DEXAMETHASONE 4 MG/1
4 TABLET ORAL EVERY 12 HOURS SCHEDULED
Qty: 60 TABLET | Refills: 1 | Status: SHIPPED | OUTPATIENT
Start: 2023-11-07

## 2023-11-07 RX ORDER — DEXAMETHASONE 4 MG/1
4 TABLET ORAL EVERY 12 HOURS SCHEDULED
Status: DISCONTINUED | OUTPATIENT
Start: 2023-11-07 | End: 2023-11-07

## 2023-11-07 RX ORDER — TEMOZOLOMIDE 140 MG/1
150 CAPSULE ORAL DAILY
COMMUNITY
Start: 2023-09-14

## 2023-11-07 RX ORDER — LEVETIRACETAM 500 MG/1
500 TABLET ORAL 2 TIMES DAILY
Status: DISCONTINUED | OUTPATIENT
Start: 2023-11-07 | End: 2023-11-07

## 2023-11-07 RX ORDER — DEXAMETHASONE 2 MG/1
2 TABLET ORAL ONCE
Status: DISCONTINUED | OUTPATIENT
Start: 2023-11-07 | End: 2023-11-07

## 2023-11-07 NOTE — PLAN OF CARE
Problem: Patient Centered Care  Goal: Patient preferences are identified and integrated in the patient's plan of care  Description: Interventions:  - What would you like us to know as we care for you? I love partha.   - Provide timely, complete, and accurate information to patient/family  - Incorporate patient and family knowledge, values, beliefs, and cultural backgrounds into the planning and delivery of care  - Encourage patient/family to participate in care and decision-making at the level they choose  - Honor patient and family perspectives and choices  Outcome: Progressing     Problem: SAFETY ADULT - FALL  Goal: Free from fall injury  Description: INTERVENTIONS:  - Assess pt frequently for physical needs  - Identify cognitive and physical deficits and behaviors that affect risk of falls.   - [place] fall precautions as indicated by assessment.  - Educate pt/family on patient safety including physical limitations  - Instruct pt to call for assistance with activity based on assessment  - Modify environment to reduce risk of injury  - Provide assistive devices as appropriate  - Consider OT/PT consult to assist with strengthening/mobility  - Encourage toileting schedule  Outcome: Progressing     Problem: DISCHARGE PLANNING  Goal: Discharge to home or other facility with appropriate resources  Description: INTERVENTIONS:  - Identify barriers to discharge w/pt and caregiver  - Include patient/family/discharge partner in discharge planning  - Arrange for needed discharge resources and transportation as appropriate  - Identify discharge learning needs (meds, wound care, etc)  - Arrange for interpreters to assist at discharge as needed  - Consider post-discharge preferences of patient/family/discharge partner  - Complete POLST form as appropriate  - Assess patient's ability to be responsible for managing their own health  - Refer to Case Management Department for coordinating discharge planning if the patient needs post-hospital services based on physician/LIP order or complex needs related to functional status, cognitive ability or social support system  Outcome: Progressing     Problem: GASTROINTESTINAL - ADULT  Goal: Minimal or absence of nausea and vomiting  Description: INTERVENTIONS:  - Maintain adequate hydration with IV or PO as ordered and tolerated  - Nasogastric tube to low intermittent suction as ordered  - Evaluate effectiveness of ordered antiemetic medications  - Provide nonpharmacologic comfort measures as appropriate  - Advance diet as tolerated, if ordered  - Obtain nutritional consult as needed  - Evaluate fluid balance  Outcome: Progressing  Goal: Maintains or returns to baseline bowel function  Description: INTERVENTIONS:  - Assess bowel function  - Maintain adequate hydration with IV or PO as ordered and tolerated  - Evaluate effectiveness of GI medications  - Encourage mobilization and activity  - Obtain nutritional consult as needed  - Establish a toileting routine/schedule  - Consider collaborating with pharmacy to review patient's medication profile  Outcome: Progressing  Goal: Maintains adequate nutritional intake (undernourished)  Description: INTERVENTIONS:  - Monitor percentage of each meal consumed  - Identify factors contributing to decreased intake, treat as appropriate  - Assist with meals as needed  - Monitor I&O, WT and lab values  - Obtain nutritional consult as needed  - Optimize oral hygiene and moisture  - Encourage food from home; allow for food preferences  - Enhance eating environment  Outcome: Progressing     Problem: Patient/Family Goals  Goal: Patient/Family Long Term Goal  Description: Patient's Long Term Goal:     Interventions:  - See additional Care Plan goals for specific interventions  Outcome: Progressing  Goal: Patient/Family Short Term Goal  Description: Patient's Short Term Goal:     Interventions:   - See additional Care Plan goals for specific interventions  Outcome: Progressing   Patient is alert, oriented to self and place. Delayed responses. Vital signs stable on room air. Voiding via purewick. Remote tele in place. IVF infusing. Tolerating general diet, denies nausea/vomiting. Denies pain. Repositioned as tolerated. Safety precautions in place, call light within reach, frequent rounding done. Awaiting MRI of the brain.

## 2023-11-07 NOTE — PLAN OF CARE
Patient alert and oriented X4, some trouble with finding words but voice is clear. Denies pain. Tolerating diet. Able to feed self this morning, swallowing pills whole with juice. PT/OT consulted to reevaluate patient and they cleared her for home with home health but patient declined home health. Patient cleared for discharge. Discharge instructions reviewed with patient and  at bedside including need for follow up appointment; continued and discontinued medications and when to seek medical attention. Patient escorted to lobby via wheelchair, escorted home by . Problem: Patient Centered Care  Goal: Patient preferences are identified and integrated in the patient's plan of care  Description: Interventions:  - What would you like us to know as we care for you? I love partha.   - Provide timely, complete, and accurate information to patient/family  - Incorporate patient and family knowledge, values, beliefs, and cultural backgrounds into the planning and delivery of care  - Encourage patient/family to participate in care and decision-making at the level they choose  - Honor patient and family perspectives and choices  Outcome: Adequate for Discharge     Problem: SAFETY ADULT - FALL  Goal: Free from fall injury  Description: INTERVENTIONS:  - Assess pt frequently for physical needs  - Identify cognitive and physical deficits and behaviors that affect risk of falls.   - [place] fall precautions as indicated by assessment.  - Educate pt/family on patient safety including physical limitations  - Instruct pt to call for assistance with activity based on assessment  - Modify environment to reduce risk of injury  - Provide assistive devices as appropriate  - Consider OT/PT consult to assist with strengthening/mobility  - Encourage toileting schedule  Outcome: Adequate for Discharge     Problem: DISCHARGE PLANNING  Goal: Discharge to home or other facility with appropriate resources  Description: INTERVENTIONS:  - Identify barriers to discharge w/pt and caregiver  - Include patient/family/discharge partner in discharge planning  - Arrange for needed discharge resources and transportation as appropriate  - Identify discharge learning needs (meds, wound care, etc)  - Arrange for interpreters to assist at discharge as needed  - Consider post-discharge preferences of patient/family/discharge partner  - Complete POLST form as appropriate  - Assess patient's ability to be responsible for managing their own health  - Refer to Case Management Department for coordinating discharge planning if the patient needs post-hospital services based on physician/LIP order or complex needs related to functional status, cognitive ability or social support system  Outcome: Adequate for Discharge     Problem: GASTROINTESTINAL - ADULT  Goal: Minimal or absence of nausea and vomiting  Description: INTERVENTIONS:  - Maintain adequate hydration with IV or PO as ordered and tolerated  - Nasogastric tube to low intermittent suction as ordered  - Evaluate effectiveness of ordered antiemetic medications  - Provide nonpharmacologic comfort measures as appropriate  - Advance diet as tolerated, if ordered  - Obtain nutritional consult as needed  - Evaluate fluid balance  Outcome: Adequate for Discharge  Goal: Maintains or returns to baseline bowel function  Description: INTERVENTIONS:  - Assess bowel function  - Maintain adequate hydration with IV or PO as ordered and tolerated  - Evaluate effectiveness of GI medications  - Encourage mobilization and activity  - Obtain nutritional consult as needed  - Establish a toileting routine/schedule  - Consider collaborating with pharmacy to review patient's medication profile  Outcome: Adequate for Discharge  Goal: Maintains adequate nutritional intake (undernourished)  Description: INTERVENTIONS:  - Monitor percentage of each meal consumed  - Identify factors contributing to decreased intake, treat as appropriate  - Assist with meals as needed  - Monitor I&O, WT and lab values  - Obtain nutritional consult as needed  - Optimize oral hygiene and moisture  - Encourage food from home; allow for food preferences  - Enhance eating environment  Outcome: Adequate for Discharge

## 2023-11-07 NOTE — PHYSICAL THERAPY NOTE
PHYSICAL THERAPY TREATMENT NOTE - INPATIENT     Room Number: 465/465-A       Presenting Problem: frequent falls, worsening speech  Co-Morbidities : Glioblastoma; Grade 4    Problem List  Principal Problem:    Frequent falls  Active Problems:    GBM (glioblastoma multiforme) (HCC)    Traumatic left-sided intracerebral hemorrhage with unknown loss of consciousness status, initial encounter ([facility] [address]    Expressive aphasia    Cerebral edema ([facility] [address]    History of emilee hole surgery      PHYSICAL THERAPY ASSESSMENT     Patient in bed with  present upon arrival. RN approved activity. Educated patient on POC and benefits of mobilization. Agreeable to participate. Patient reporting no pain. Patient making significant progress towards IP PT goals this session since initial evaluation. Patient reports that she feels much better. Patient able to hold fluid conversation with therapists this session. Patient able to motor plan and complete all functional mobility tasks this session with CGA>SBA. Bed Mobility: CGA supine>EOB. Patient tolerated sitting EOB approx 3 minutes, requiring BUE support and CGA>SBA in order to maintain static sitting balance. Transfers: CGA sit<>stand with bilateral HHA from EOB and CGA sit<>stand with RW from bedside chair; cues provided for appropriate UE placement during functional transfers. Instruction on activity pacing upon standing to allow body time to acclimate to change in position. Tolerated static standing with BUE support on RW and CGA for approx 1 minute prior to mobilization. Ambulation: CGA 3 ft with BUE HHA from bed>chair and CGA>close SBA with RW for 150 ft; shuffled steps, decreased shahid speed, increased lateral sway, slightly unsteady, no LOB. Cues for safe management of RW with turns. Patient sitting in bedside chair at end of session. Needs in reach and alarm activated.  present. RN aware.       The patient's Approx Degree of Impairment: 46.58% has been calculated based on documentation in the [facility] '6 clicks' Inpatient Basic Mobility Short Form. Research supports that patients with this level of impairment may benefit from home-health PT with initial 24 hour supervision/care in order to optimize functional independence. DISCHARGE RECOMMENDATIONS  PT Discharge Recommendations: Home with home health PT;24 hour care/supervision     PLAN  PT Treatment Plan: Bed mobility; Body mechanics; Coordination; Endurance; Energy conservation;Patient education;Gait training;Strengthening;Transfer training;Balance training  Frequency (Obs): 5x/week    SUBJECTIVE  \"I feel so much better\"    OBJECTIVE  Precautions: Bed/chair alarm    PAIN ASSESSMENT   Ratin    BALANCE  Static Sitting: Good  Dynamic Sitting: Fair +  Static Standing: Fair  Dynamic Standing: Fair -    ACTIVITY TOLERANCE  Pulse: 84  Heart Rate Source: Monitor     O2 WALK  Oxygen Therapy  SPO2% Ambulation on Room Air: 97    AM-PAC '6-Clicks' INPATIENT SHORT FORM - BASIC MOBILITY  How much difficulty does the patient currently have. .. Patient Difficulty: Turning over in bed (including adjusting bedclothes, sheets and blankets)?: A Little   Patient Difficulty: Sitting down on and standing up from a chair with arms (e.g., wheelchair, bedside commode, etc.): A Little   Patient Difficulty: Moving from lying on back to sitting on the side of the bed?: A Little   How much help from another person does the patient currently need. ..    Help from Another: Moving to and from a bed to a chair (including a wheelchair)?: A Little   Help from Another: Need to walk in hospital room?: A Little   Help from Another: Climbing 3-5 steps with a railing?: A Little     AM-PAC Score:  Raw Score: 18   Approx Degree of Impairment: 46.58%   Standardized Score (AM-PAC Scale): 43.63   CMS Modifier (G-Code): CK    FUNCTIONAL ABILITY STATUS  Functional Mobility/Gait Assessment  Gait Assistance: Contact guard assist (close Stand by assistance)  Distance (ft): 3 ft and 150 ft  Assistive Device: Other (Comment); Rolling walker (bilateral HHA)  Pattern: Shuffle (decreased shahid speed, increased lateral sway, slightly unsteady, no LOB)    Patient End of Session: Up in chair;Needs met;Call light within reach;RN aware of session/findings; All patient questions and concerns addressed; Alarm set; Family present    CURRENT GOALS   Goals to be met by: 11/20/23  Patient Goal Patient's self-stated goal is: none stated   Goal #1 Patient is able to demonstrate supine - sit EOB @ level: supervision   Goal #1   Current Status  Goal met and updated 11/7/23: CGA    Goal #2 Patient is able to demonstrate transfers Sit to/from Stand at assistance level: supervision with walker - rolling      Goal #2  Current Status  Goal met and updated 11/7/23: CGA with RW   Goal #3 Patient is able to ambulate 200 feet with assist device: walker - rolling at assistance level: supervision   Goal #3   Current Status  Goal met and updated 11/7/23: CGA>close  ft with RW   Goal #4 Patient to demonstrate independence with home activity/exercise instructions provided to patient in preparation for discharge.    Goal #4   Current Status  Ongoing      Gait Training: 10 minutes  Therapeutic Activity: 15 minutes

## 2023-11-07 NOTE — DISCHARGE INSTRUCTIONS
Sometimes managing your health at home requires assistance. If you choose to have home services,  [facility] is available. Have your MD call to set it up. [facility]     [address] [facility], [address]  Phone: [phone]  Fax: [phone]    Continue Temodar as previously prescribed. Continue Dexamethasone until taper by oncology     Follow up with physicians as instructed, call their office to schedule an appointment. Seek medical attention if you develop any chest pain or shortness of breath; severe nausea, vomiting, diarrhea or constipation; severe pain that is not controlled with your pain medication; temperature greater than 100.5. None

## 2024-02-17 ENCOUNTER — HOSPITAL ENCOUNTER (EMERGENCY)
Facility: HOSPITAL | Age: 69
Discharge: HOME | End: 2024-02-17
Attending: EMERGENCY MEDICINE
Payer: MEDICARE

## 2024-02-17 ENCOUNTER — APPOINTMENT (OUTPATIENT)
Dept: CT IMAGING | Facility: HOSPITAL | Age: 69
End: 2024-02-17
Attending: EMERGENCY MEDICINE
Payer: MEDICARE

## 2024-02-17 VITALS
SYSTOLIC BLOOD PRESSURE: 147 MMHG | RESPIRATION RATE: 16 BRPM | BODY MASS INDEX: 35 KG/M2 | OXYGEN SATURATION: 95 % | TEMPERATURE: 98 F | WEIGHT: 200 LBS | HEART RATE: 78 BPM | DIASTOLIC BLOOD PRESSURE: 104 MMHG

## 2024-02-17 DIAGNOSIS — R22.0 FACIAL SWELLING: Primary | ICD-10-CM

## 2024-02-17 LAB
ANION GAP SERPL CALC-SCNC: 8 MMOL/L (ref 0–18)
BASOPHILS # BLD AUTO: 0.05 X10(3) UL (ref 0–0.2)
BASOPHILS NFR BLD AUTO: 0.6 %
BUN BLD-MCNC: 28 MG/DL (ref 9–23)
BUN/CREAT SERPL: 31.5 (ref 10–20)
CALCIUM BLD-MCNC: 8.6 MG/DL (ref 8.7–10.4)
CHLORIDE SERPL-SCNC: 108 MMOL/L (ref 98–112)
CO2 SERPL-SCNC: 26 MMOL/L (ref 21–32)
CREAT BLD-MCNC: 0.89 MG/DL
DEPRECATED RDW RBC AUTO: 51.6 FL (ref 35.1–46.3)
EGFRCR SERPLBLD CKD-EPI 2021: 71 ML/MIN/1.73M2 (ref 60–?)
EOSINOPHIL # BLD AUTO: 0.01 X10(3) UL (ref 0–0.7)
EOSINOPHIL NFR BLD AUTO: 0.1 %
ERYTHROCYTE [DISTWIDTH] IN BLOOD BY AUTOMATED COUNT: 16.2 % (ref 11–15)
GLUCOSE BLD-MCNC: 111 MG/DL (ref 70–99)
HCT VFR BLD AUTO: 47.8 %
HGB BLD-MCNC: 15.3 G/DL
IMM GRANULOCYTES # BLD AUTO: 0.39 X10(3) UL (ref 0–1)
IMM GRANULOCYTES NFR BLD: 4.7 %
LYMPHOCYTES # BLD AUTO: 1.16 X10(3) UL (ref 1–4)
LYMPHOCYTES NFR BLD AUTO: 14 %
MCH RBC QN AUTO: 28 PG (ref 26–34)
MCHC RBC AUTO-ENTMCNC: 32 G/DL (ref 31–37)
MCV RBC AUTO: 87.4 FL
MONOCYTES # BLD AUTO: 0.44 X10(3) UL (ref 0.1–1)
MONOCYTES NFR BLD AUTO: 5.3 %
NEUTROPHILS # BLD AUTO: 6.21 X10 (3) UL (ref 1.5–7.7)
NEUTROPHILS # BLD AUTO: 6.21 X10(3) UL (ref 1.5–7.7)
NEUTROPHILS NFR BLD AUTO: 75.3 %
OSMOLALITY SERPL CALC.SUM OF ELEC: 300 MOSM/KG (ref 275–295)
PLATELET # BLD AUTO: 100 10(3)UL (ref 150–450)
POTASSIUM SERPL-SCNC: 3 MMOL/L (ref 3.5–5.1)
RBC # BLD AUTO: 5.47 X10(6)UL
SODIUM SERPL-SCNC: 142 MMOL/L (ref 136–145)
WBC # BLD AUTO: 8.3 X10(3) UL (ref 4–11)

## 2024-02-17 PROCEDURE — 99284 EMERGENCY DEPT VISIT MOD MDM: CPT

## 2024-02-17 PROCEDURE — 96365 THER/PROPH/DIAG IV INF INIT: CPT

## 2024-02-17 PROCEDURE — 96375 TX/PRO/DX INJ NEW DRUG ADDON: CPT

## 2024-02-17 PROCEDURE — 80048 BASIC METABOLIC PNL TOTAL CA: CPT | Performed by: EMERGENCY MEDICINE

## 2024-02-17 PROCEDURE — 85025 COMPLETE CBC W/AUTO DIFF WBC: CPT | Performed by: EMERGENCY MEDICINE

## 2024-02-17 PROCEDURE — 70491 CT SOFT TISSUE NECK W/DYE: CPT | Performed by: EMERGENCY MEDICINE

## 2024-02-17 RX ORDER — AMOXICILLIN AND CLAVULANATE POTASSIUM 875; 125 MG/1; MG/1
1 TABLET, FILM COATED ORAL 2 TIMES DAILY
Qty: 20 TABLET | Refills: 0 | Status: SHIPPED | OUTPATIENT
Start: 2024-02-17 | End: 2024-02-27

## 2024-02-17 RX ORDER — POTASSIUM CHLORIDE 20 MEQ/1
40 TABLET, EXTENDED RELEASE ORAL ONCE
Status: DISCONTINUED | OUTPATIENT
Start: 2024-02-17 | End: 2024-02-17

## 2024-02-17 RX ORDER — HYDROCODONE BITARTRATE AND ACETAMINOPHEN 5; 325 MG/1; MG/1
1 TABLET ORAL EVERY 6 HOURS PRN
Qty: 10 TABLET | Refills: 0 | Status: SHIPPED | OUTPATIENT
Start: 2024-02-17

## 2024-02-17 RX ORDER — HEPARIN SODIUM (PORCINE) LOCK FLUSH IV SOLN 100 UNIT/ML 100 UNIT/ML
SOLUTION INTRAVENOUS
Status: COMPLETED
Start: 2024-02-17 | End: 2024-02-17

## 2024-02-17 RX ORDER — POTASSIUM CHLORIDE 1.5 G/1.58G
40 POWDER, FOR SOLUTION ORAL ONCE
Status: COMPLETED | OUTPATIENT
Start: 2024-02-17 | End: 2024-02-17

## 2024-02-17 NOTE — ED INITIAL ASSESSMENT (HPI)
Here for worsening right cheek/facial swelling and pain x 2 weeks, saw her pcp for and was prescribed abx for thrush. Unrelieved by tylenol. History of brain lesions, plan for biopsy of the cheek on 2/22

## 2024-02-18 NOTE — ED PROVIDER NOTES
Patient Seen in: [facility] Emergency Department      History     Chief Complaint   Patient presents with    Swelling     Stated Complaint: Mouth Swelling    Subjective:   HPI    Patient presents the emergency department complaining of right-sided facial swelling.  She states that she has had swelling which has been there for the last 2 to 3 weeks and has a scheduled biopsy to be performed by an ENT doctor in a few days.  They presented here today because she was having discomfort which is unusual because it was not painful before.  There is no fever, difficulty swallowing or breathing.  There is no other aggravating or alleviating factors.  She has been on Keflex for 2 days due to the mass.  Of note she is also being treated for brain tumor.    Objective:   Past Medical History:   Diagnosis Date    Abnormality of gait 2017    BRCA1 negative     BRCA2 negative     Decreased range of motion of left knee 2018    Decreased strength of lower extremity 9/10/2019    Infected prosthetic knee joint  (HCC)     Muscle weakness     Osteoarthritis     PONV (postoperative nausea and vomiting)     profound nausea and vomiting    Pre-op exam 10/19/2017    Visual impairment     glasses              Past Surgical History:   Procedure Laterality Date    BENIGN BIOPSY LEFT  2018    stereotactic biopsy, benign          GASTRIC BYPASS,OBESE<100CM YURY-EN-Y      HYSTERECTOMY      Ovaries removed as well - age 44    HYSTEROSCOPY      KNEE REPLACEMENT SURGERY      BARBRAULATKELSIE KNEE JT+ANESTHESIA Right 2017    Dr Hillary BELLEULATN KNEE JT+ANESTHESIA Left 2018    Dr Gutierrez    TOTAL KNEE REPLACEMENT Left 2017    Dr Gutierrez    TUBAL LIGATION                  Social History     Socioeconomic History    Marital status:    Tobacco Use    Smoking status: Never    Smokeless tobacco: Never   Vaping Use    Vaping Use: Never used   Substance and Sexual Activity    Alcohol use: Not  Currently    Drug use: No    Sexual activity: Yes     Partners: Male     Social Determinants of Health     Food Insecurity: No Food Insecurity (11/6/2023)    Food Insecurity     Food Insecurity: Never true   Transportation Needs: No Transportation Needs (11/6/2023)    Transportation Needs     Lack of Transportation: No   Housing Stability: Low Risk  (11/6/2023)    Housing Stability     Housing Instability: No              Review of Systems    Positive for stated complaint: Mouth Swelling  Other systems are as noted in HPI.  Constitutional and vital signs reviewed.      All other systems reviewed and negative except as noted above.    Physical Exam     ED Triage Vitals [02/17/24 1218]   /89   Pulse 83   Resp 18   Temp 97.9 °F (36.6 °C)   Temp src Temporal   SpO2 97 %   O2 Device None (Room air)       Current:BP (!) 147/104   Pulse 78   Temp 97.9 °F (36.6 °C) (Temporal)   Resp 16   Wt 90.7 kg   SpO2 95%   BMI 35.43 kg/m²         Physical Exam  Vitals and nursing note reviewed.   Constitutional:       General: She is not in acute distress.     Appearance: She is well-developed.   HENT:      Head: Normocephalic.      Comments: There is a large area of facial swelling over the maxillary region of the face.  There is no obvious fluctuance.  There is mild erythema but no periorbital swelling or redness.  Intraoral exam appears patent with no stridor and normal voice.  Eyes:      Conjunctiva/sclera: Conjunctivae normal.   Cardiovascular:      Rate and Rhythm: Normal rate and regular rhythm.      Heart sounds: No murmur heard.  Pulmonary:      Effort: Pulmonary effort is normal. No respiratory distress.      Breath sounds: Normal breath sounds.   Abdominal:      General: There is no distension.      Palpations: Abdomen is soft.      Tenderness: There is no abdominal tenderness.   Musculoskeletal:         General: No tenderness. Normal range of motion.      Cervical back: Normal range of motion and neck supple.    Skin:     General: Skin is warm and dry.      Findings: No rash.   Neurological:      Mental Status: She is alert and oriented to person, place, and time.               ED Course     Labs Reviewed   BASIC METABOLIC PANEL (8) - Abnormal; Notable for the following components:       Result Value    Glucose 111 (*)     Potassium 3.0 (*)     BUN 28 (*)     BUN/CREA Ratio 31.5 (*)     Calcium, Total 8.6 (*)     Calculated Osmolality 300 (*)     All other components within normal limits   CBC W/ DIFFERENTIAL - Abnormal; Notable for the following components:    RBC 5.47 (*)     RDW-SD 51.6 (*)     RDW 16.2 (*)     .0 (*)     All other components within normal limits   CBC WITH DIFFERENTIAL WITH PLATELET    Narrative:     The following orders were created for panel order CBC With Differential With Platelet.  Procedure                               Abnormality         Status                     ---------                               -----------         ------                     CBC W/ DIFFERENTIAL[[id number]]          Abnormal            Final result                 Please view results for these tests on the individual orders.                      MDM                      Medical Decision Making  Differential diagnosis considered for tumor, abscess, cyst.    Problems Addressed:  Facial swelling: acute illness or injury    Amount and/or Complexity of Data Reviewed  Labs: ordered. Decision-making details documented in ED Course.     Details: Laboratory studies unremarkable with the exception of decreased potassium.  Radiology: ordered and independent interpretation performed. Decision-making details documented in ED Course.     Details: CT scan soft tissue of the neck shows patent airway and no involvement of the oropharynx.  There is a large possibly necrotic tumor mass in the right face.  Discussion of management or test interpretation with external provider(s): I will switch the patient's antibiotics to a broader  coverage with Augmentin and have her call on Monday to arrange for either continued care or expedited evaluation by ENT for possible biopsy.    Risk  Prescription drug management.        Disposition and Plan     Clinical Impression:  1. Facial swelling         Disposition:  Discharge  2/17/2024  4:07 pm    Follow-up:  Ciera Disla MD  [address] [place] [id number]  [phone]    Schedule an appointment as soon as possible for a visit      We recommend that you schedule follow up care with a primary care provider within the next three months to obtain basic health screening including reassessment of your blood pressure.      Medications Prescribed:  Discharge Medication List as of 2/17/2024  4:28 PM        START taking these medications    Details   amoxicillin clavulanate 875-125 MG Oral Tab Take 1 tablet by mouth 2 (two) times daily for 10 days., Normal, Disp-20 tablet, R-0      HYDROcodone-acetaminophen 5-325 MG Oral Tab Take 1 tablet by mouth every 6 (six) hours as needed., Normal, Disp-10 tablet, R-0

## 2024-05-06 ENCOUNTER — HOSPITAL ENCOUNTER (OUTPATIENT)
Facility: HOSPITAL | Age: 69
Setting detail: OBSERVATION
LOS: 1 days | Discharge: HOME | End: 2024-05-09
Attending: EMERGENCY MEDICINE | Admitting: HOSPITALIST

## 2024-05-06 DIAGNOSIS — E87.5 HYPERKALEMIA: ICD-10-CM

## 2024-05-06 DIAGNOSIS — R53.1 WEAKNESS GENERALIZED: Primary | ICD-10-CM

## 2024-05-06 LAB
ANION GAP SERPL CALC-SCNC: 10 MMOL/L (ref 0–18)
BASOPHILS # BLD AUTO: 0.03 X10(3) UL (ref 0–0.2)
BASOPHILS NFR BLD AUTO: 0.3 %
BUN BLD-MCNC: 17 MG/DL (ref 9–23)
BUN/CREAT SERPL: 13 (ref 10–20)
CALCIUM BLD-MCNC: 9.5 MG/DL (ref 8.7–10.4)
CHLORIDE SERPL-SCNC: 115 MMOL/L (ref 98–112)
CO2 SERPL-SCNC: 18 MMOL/L (ref 21–32)
CREAT BLD-MCNC: 1.31 MG/DL
DEPRECATED RDW RBC AUTO: 59 FL (ref 35.1–46.3)
EGFRCR SERPLBLD CKD-EPI 2021: 45 ML/MIN/1.73M2 (ref 60–?)
EOSINOPHIL # BLD AUTO: 0.01 X10(3) UL (ref 0–0.7)
EOSINOPHIL NFR BLD AUTO: 0.1 %
ERYTHROCYTE [DISTWIDTH] IN BLOOD BY AUTOMATED COUNT: 17.3 % (ref 11–15)
GLUCOSE BLD-MCNC: 138 MG/DL (ref 70–99)
GLUCOSE BLDC GLUCOMTR-MCNC: 111 MG/DL (ref 70–99)
HCT VFR BLD AUTO: 45.2 %
HGB BLD-MCNC: 14.3 G/DL
IMM GRANULOCYTES # BLD AUTO: 0.18 X10(3) UL (ref 0–1)
IMM GRANULOCYTES NFR BLD: 2 %
LYMPHOCYTES # BLD AUTO: 0.7 X10(3) UL (ref 1–4)
LYMPHOCYTES NFR BLD AUTO: 8 %
MCH RBC QN AUTO: 29.3 PG (ref 26–34)
MCHC RBC AUTO-ENTMCNC: 31.6 G/DL (ref 31–37)
MCV RBC AUTO: 92.6 FL
MONOCYTES # BLD AUTO: 0.6 X10(3) UL (ref 0.1–1)
MONOCYTES NFR BLD AUTO: 6.8 %
NEUTROPHILS # BLD AUTO: 7.27 X10 (3) UL (ref 1.5–7.7)
NEUTROPHILS # BLD AUTO: 7.27 X10(3) UL (ref 1.5–7.7)
NEUTROPHILS NFR BLD AUTO: 82.8 %
OSMOLALITY SERPL CALC.SUM OF ELEC: 300 MOSM/KG (ref 275–295)
PLATELET # BLD AUTO: 199 10(3)UL (ref 150–450)
POTASSIUM SERPL-SCNC: 5.8 MMOL/L (ref 3.5–5.1)
RBC # BLD AUTO: 4.88 X10(6)UL
SODIUM SERPL-SCNC: 143 MMOL/L (ref 136–145)
WBC # BLD AUTO: 8.8 X10(3) UL (ref 4–11)

## 2024-05-06 PROCEDURE — 80048 BASIC METABOLIC PNL TOTAL CA: CPT | Performed by: EMERGENCY MEDICINE

## 2024-05-06 PROCEDURE — 93010 ELECTROCARDIOGRAM REPORT: CPT

## 2024-05-06 PROCEDURE — 82962 GLUCOSE BLOOD TEST: CPT

## 2024-05-06 PROCEDURE — 99285 EMERGENCY DEPT VISIT HI MDM: CPT

## 2024-05-06 PROCEDURE — 93005 ELECTROCARDIOGRAM TRACING: CPT

## 2024-05-06 PROCEDURE — 85025 COMPLETE CBC W/AUTO DIFF WBC: CPT | Performed by: EMERGENCY MEDICINE

## 2024-05-06 PROCEDURE — 96375 TX/PRO/DX INJ NEW DRUG ADDON: CPT

## 2024-05-06 PROCEDURE — 80048 BASIC METABOLIC PNL TOTAL CA: CPT

## 2024-05-06 PROCEDURE — 85025 COMPLETE CBC W/AUTO DIFF WBC: CPT

## 2024-05-06 PROCEDURE — 96374 THER/PROPH/DIAG INJ IV PUSH: CPT

## 2024-05-06 RX ORDER — CALCIUM GLUCONATE 10 MG/ML
1 INJECTION, SOLUTION INTRAVENOUS ONCE
Status: COMPLETED | OUTPATIENT
Start: 2024-05-06 | End: 2024-05-07

## 2024-05-06 RX ORDER — DEXTROSE MONOHYDRATE 25 G/50ML
50 INJECTION, SOLUTION INTRAVENOUS ONCE
Status: COMPLETED | OUTPATIENT
Start: 2024-05-06 | End: 2024-05-06

## 2024-05-07 ENCOUNTER — APPOINTMENT (OUTPATIENT)
Dept: CT IMAGING | Facility: HOSPITAL | Age: 69
End: 2024-05-07
Attending: HOSPITALIST

## 2024-05-07 PROBLEM — E87.5 HYPERKALEMIA: Status: ACTIVE | Noted: 2024-05-07

## 2024-05-07 LAB
ANION GAP SERPL CALC-SCNC: 8 MMOL/L (ref 0–18)
ANION GAP SERPL CALC-SCNC: 9 MMOL/L (ref 0–18)
ATRIAL RATE: 145 BPM
BILIRUB UR QL: NEGATIVE
BUN BLD-MCNC: 13 MG/DL (ref 9–23)
BUN BLD-MCNC: 14 MG/DL (ref 9–23)
BUN/CREAT SERPL: 13.7 (ref 10–20)
BUN/CREAT SERPL: 13.8 (ref 10–20)
CALCIUM BLD-MCNC: 8.5 MG/DL (ref 8.7–10.4)
CALCIUM BLD-MCNC: 8.7 MG/DL (ref 8.7–10.4)
CHLORIDE SERPL-SCNC: 119 MMOL/L (ref 98–112)
CHLORIDE SERPL-SCNC: 119 MMOL/L (ref 98–112)
CLARITY UR: CLEAR
CO2 SERPL-SCNC: 20 MMOL/L (ref 21–32)
CO2 SERPL-SCNC: 21 MMOL/L (ref 21–32)
COLOR UR: YELLOW
CREAT BLD-MCNC: 0.94 MG/DL
CREAT BLD-MCNC: 1.02 MG/DL
DEPRECATED RDW RBC AUTO: 57.8 FL (ref 35.1–46.3)
EGFRCR SERPLBLD CKD-EPI 2021: 61 ML/MIN/1.73M2 (ref 60–?)
EGFRCR SERPLBLD CKD-EPI 2021: 67 ML/MIN/1.73M2 (ref 60–?)
ERYTHROCYTE [DISTWIDTH] IN BLOOD BY AUTOMATED COUNT: 17.2 % (ref 11–15)
GLUCOSE BLD-MCNC: 127 MG/DL (ref 70–99)
GLUCOSE BLD-MCNC: 138 MG/DL (ref 70–99)
GLUCOSE BLDC GLUCOMTR-MCNC: 129 MG/DL (ref 70–99)
GLUCOSE UR-MCNC: NORMAL MG/DL
HCT VFR BLD AUTO: 40.2 %
HGB BLD-MCNC: 12.6 G/DL
HGB UR QL STRIP.AUTO: NEGATIVE
KETONES UR-MCNC: NEGATIVE MG/DL
LEUKOCYTE ESTERASE UR QL STRIP.AUTO: 250
MCH RBC QN AUTO: 28.9 PG (ref 26–34)
MCHC RBC AUTO-ENTMCNC: 31.3 G/DL (ref 31–37)
MCV RBC AUTO: 92.2 FL
NITRITE UR QL STRIP.AUTO: NEGATIVE
OSMOLALITY SERPL CALC.SUM OF ELEC: 308 MOSM/KG (ref 275–295)
OSMOLALITY SERPL CALC.SUM OF ELEC: 309 MOSM/KG (ref 275–295)
P AXIS: 48 DEGREES
P-R INTERVAL: 116 MS
PH UR: 5 [PH] (ref 5–8)
PLATELET # BLD AUTO: 161 10(3)UL (ref 150–450)
POTASSIUM SERPL-SCNC: 4.6 MMOL/L (ref 3.5–5.1)
POTASSIUM SERPL-SCNC: 5.1 MMOL/L (ref 3.5–5.1)
PROT UR-MCNC: NEGATIVE MG/DL
Q-T INTERVAL: 330 MS
QRS DURATION: 58 MS
QTC CALCULATION (BEZET): 512 MS
R AXIS: 24 DEGREES
RBC # BLD AUTO: 4.36 X10(6)UL
SODIUM SERPL-SCNC: 148 MMOL/L (ref 136–145)
SODIUM SERPL-SCNC: 148 MMOL/L (ref 136–145)
SP GR UR STRIP: 1.01 (ref 1–1.03)
T AXIS: 28 DEGREES
UROBILINOGEN UR STRIP-ACNC: NORMAL
VENTRICULAR RATE: 145 BPM
WBC # BLD AUTO: 7.4 X10(3) UL (ref 4–11)

## 2024-05-07 PROCEDURE — 97530 THERAPEUTIC ACTIVITIES: CPT

## 2024-05-07 PROCEDURE — 93010 ELECTROCARDIOGRAM REPORT: CPT | Performed by: INTERNAL MEDICINE

## 2024-05-07 PROCEDURE — 82962 GLUCOSE BLOOD TEST: CPT

## 2024-05-07 PROCEDURE — 80048 BASIC METABOLIC PNL TOTAL CA: CPT | Performed by: NURSE PRACTITIONER

## 2024-05-07 PROCEDURE — 36415 COLL VENOUS BLD VENIPUNCTURE: CPT | Performed by: HOSPITALIST

## 2024-05-07 PROCEDURE — 81001 URINALYSIS AUTO W/SCOPE: CPT | Performed by: NURSE PRACTITIONER

## 2024-05-07 PROCEDURE — 97166 OT EVAL MOD COMPLEX 45 MIN: CPT

## 2024-05-07 PROCEDURE — 93005 ELECTROCARDIOGRAM TRACING: CPT

## 2024-05-07 PROCEDURE — 97162 PT EVAL MOD COMPLEX 30 MIN: CPT

## 2024-05-07 PROCEDURE — 70450 CT HEAD/BRAIN W/O DYE: CPT | Performed by: HOSPITALIST

## 2024-05-07 PROCEDURE — 85027 COMPLETE CBC AUTOMATED: CPT | Performed by: HOSPITALIST

## 2024-05-07 PROCEDURE — 87086 URINE CULTURE/COLONY COUNT: CPT | Performed by: NURSE PRACTITIONER

## 2024-05-07 PROCEDURE — 80048 BASIC METABOLIC PNL TOTAL CA: CPT | Performed by: HOSPITALIST

## 2024-05-07 RX ORDER — ENEMA 19; 7 G/133ML; G/133ML
1 ENEMA RECTAL ONCE AS NEEDED
Status: DISCONTINUED | OUTPATIENT
Start: 2024-05-07 | End: 2024-05-09

## 2024-05-07 RX ORDER — FOLIC ACID 1 MG/1
1 TABLET ORAL DAILY
Status: DISCONTINUED | OUTPATIENT
Start: 2024-05-07 | End: 2024-05-09

## 2024-05-07 RX ORDER — DEXTROSE AND SODIUM CHLORIDE 5; .45 G/100ML; G/100ML
INJECTION, SOLUTION INTRAVENOUS CONTINUOUS
Status: DISCONTINUED | OUTPATIENT
Start: 2024-05-07 | End: 2024-05-09

## 2024-05-07 RX ORDER — HYDROCODONE BITARTRATE AND ACETAMINOPHEN 5; 325 MG/1; MG/1
1 TABLET ORAL EVERY 6 HOURS PRN
Status: DISCONTINUED | OUTPATIENT
Start: 2024-05-07 | End: 2024-05-09

## 2024-05-07 RX ORDER — POLYETHYLENE GLYCOL 3350 17 G/17G
17 POWDER, FOR SOLUTION ORAL DAILY PRN
Status: DISCONTINUED | OUTPATIENT
Start: 2024-05-07 | End: 2024-05-09

## 2024-05-07 RX ORDER — DEXAMETHASONE 4 MG/1
4 TABLET ORAL EVERY 12 HOURS SCHEDULED
Status: DISCONTINUED | OUTPATIENT
Start: 2024-05-07 | End: 2024-05-09

## 2024-05-07 RX ORDER — SENNOSIDES 8.6 MG
17.2 TABLET ORAL NIGHTLY PRN
Status: DISCONTINUED | OUTPATIENT
Start: 2024-05-07 | End: 2024-05-09

## 2024-05-07 RX ORDER — DEXTROSE AND SODIUM CHLORIDE 5; .9 G/100ML; G/100ML
INJECTION, SOLUTION INTRAVENOUS CONTINUOUS
Status: DISCONTINUED | OUTPATIENT
Start: 2024-05-07 | End: 2024-05-07

## 2024-05-07 RX ORDER — LEVETIRACETAM 500 MG/1
500 TABLET ORAL 2 TIMES DAILY
Status: DISCONTINUED | OUTPATIENT
Start: 2024-05-07 | End: 2024-05-09

## 2024-05-07 RX ORDER — BISACODYL 10 MG
10 SUPPOSITORY, RECTAL RECTAL
Status: DISCONTINUED | OUTPATIENT
Start: 2024-05-07 | End: 2024-05-09

## 2024-05-07 RX ORDER — LEVETIRACETAM 500 MG/1
500 TABLET ORAL 2 TIMES DAILY
COMMUNITY

## 2024-05-07 RX ORDER — ENOXAPARIN SODIUM 100 MG/ML
40 INJECTION SUBCUTANEOUS DAILY
Status: DISCONTINUED | OUTPATIENT
Start: 2024-05-07 | End: 2024-05-07

## 2024-05-07 RX ORDER — METOCLOPRAMIDE HYDROCHLORIDE 5 MG/ML
5 INJECTION INTRAMUSCULAR; INTRAVENOUS EVERY 8 HOURS PRN
Status: DISCONTINUED | OUTPATIENT
Start: 2024-05-07 | End: 2024-05-09

## 2024-05-07 RX ORDER — ONDANSETRON 2 MG/ML
4 INJECTION INTRAMUSCULAR; INTRAVENOUS EVERY 6 HOURS PRN
Status: DISCONTINUED | OUTPATIENT
Start: 2024-05-07 | End: 2024-05-09

## 2024-05-07 RX ORDER — ACETAMINOPHEN 500 MG
500 TABLET ORAL EVERY 4 HOURS PRN
Status: DISCONTINUED | OUTPATIENT
Start: 2024-05-07 | End: 2024-05-09

## 2024-05-07 NOTE — HOSPICE RN NOTE
[facility] does not service patient's home area of [place], [place]. Hospice will reach out to spouse and hospital SW/CM team to re-refer to patient's in-service area hospice companies.    Romina Rodriguez RN, BSN  Transitional Nurse Liaison  [facility]  [phone] (After-hours)    LVM to spouse Isidro @ [phone].

## 2024-05-07 NOTE — ED INITIAL ASSESSMENT (HPI)
Pt. Presents to ER with  who reports pt. Has hx of stage 4 brain cancer. Is currently receiving chemo treatments.      reports pt. Has had a lot of weakness, decreased appetite, shakiness, leaning toward the right x 6 days.

## 2024-05-07 NOTE — PROGRESS NOTES
[facility] RN note per Leola Araya RN    Met with patient and spouse Isidro along with  Bambi.  Hospice services discussed and answered questions.  Hospice consents signed, plan to start hospice after discharged home.  Will order DME- bed and over bed table.  POLST filled out for DNR/comfort and placed in chart for physician signature.    Leola Araya RN  [facility].

## 2024-05-07 NOTE — PHYSICAL THERAPY NOTE
PHYSICAL THERAPY EVALUATION - INPATIENT     Room Number: 471/471-A  Evaluation Date: 5/7/2024  Type of Evaluation: Initial   Physician Order: PT Eval and Treat    Presenting Problem: generalized weakness  Co-Morbidities : stage 4 brain cancer  Reason for Therapy: Mobility Dysfunction and Discharge Planning    PHYSICAL THERAPY ASSESSMENT   Patient is a 68 year old female admitted 5/6/2024 for generalized weakness.  Prior to admission, patient's baseline is from home with assist of spouse, historically independent but with decline prior to this most recent admission.  Patient is currently functioning below baseline with bed mobility, transfers, and gait.  Patient is requiring moderate assist as a result of the following impairments: decreased functional strength, decreased endurance/aerobic capacity, pain, cognitive deficits (AMS oriented to self only), and medical status.  Physical Therapy will continue to follow for duration of hospitalization.    Patient will benefit from continued skilled PT Services to promote return to prior level of function and safety with continuous assistance and gradual rehabilitative therapy pending goals of care (CM note indicating \"MDO for hospice\").     PLAN  PT Treatment Plan: Bed mobility;Body mechanics;Energy conservation;Endurance;Family education;Gait training;Strengthening;Range of motion;Stair training;Balance training;Transfer training  Rehab Potential : Fair  Frequency (Obs): 3-5x/week    PHYSICAL THERAPY MEDICAL/SOCIAL HISTORY   History related to current admission: generalized weakness     Problem List  Principal Problem:    Weakness generalized  Active Problems:    Hyperkalemia      HOME SITUATION  Home Situation  Type of Home: House  Home Layout: One level  Lives With: Spouse  Patient Owned Equipment: Rolling walker     Prior Level of Jay: independent?     SUBJECTIVE  \"I'm not hungry.\"     PHYSICAL THERAPY EXAMINATION   OBJECTIVE  Precautions: Bed/chair alarm  Fall  Risk: High fall risk    WEIGHT BEARING RESTRICTION  Weight Bearing Restriction: None                PAIN ASSESSMENT     Location: R LE unable to rate       COGNITION  Overall Cognitive Status:  Impaired  Motor Planning: impaired    BALANCE  Static Sitting: Fair  Dynamic Sitting: Fair -  Static Standing: Poor +  Dynamic Standing: Poor      ACTIVITY TOLERANCE  Pulse: (!) 140 (during activity, 90 after recovery)  Heart Rate Source: Monitor          AM-PAC '6-Clicks' INPATIENT SHORT FORM - BASIC MOBILITY  How much difficulty does the patient currently have...  Patient Difficulty: Turning over in bed (including adjusting bedclothes, sheets and blankets)?: A Lot   Patient Difficulty: Sitting down on and standing up from a chair with arms (e.g., wheelchair, bedside commode, etc.): A Lot   Patient Difficulty: Moving from lying on back to sitting on the side of the bed?: A Lot   How much help from another person does the patient currently need...   Help from Another: Moving to and from a bed to a chair (including a wheelchair)?: A Lot   Help from Another: Need to walk in hospital room?: A Lot   Help from Another: Climbing 3-5 steps with a railing?: Total     AM-PAC Score:  Raw Score: 11   Approx Degree of Impairment: 72.57%   Standardized Score (AM-PAC Scale): 33.86   CMS Modifier (G-Code): CL    FUNCTIONAL ABILITY STATUS  Functional Mobility/Gait Assessment  Gait Assistance: Moderate assistance (min A of second person, maximal VC and tactile cues for sequencing)  Distance (ft): 1'  Assistive Device: Rolling walker  Pattern: Shuffle  Rolling: moderate assistance  Supine to Sit: moderate assist  Sit to Supine: moderate assist  Sit to Stand: moderate assist    Exercise/Education Provided:  Bed mobility  Body mechanics  Functional activity tolerated  Gait training  Transfer training    The patient's Approx Degree of Impairment: 72.57% has been calculated based on documentation in the [facility] '6 clicks' Inpatient Basic Mobility  Short Form.  Research supports that patients with this level of impairment may benefit from LTAC.  Final disposition will be made by interdisciplinary medical team.    Patient End of Session: Up in chair;Needs met;Call light within reach;With  staff;RN aware of session/findings;All patient questions and concerns addressed    CURRENT GOALS  Goals to be met by: 5/30  Patient Goal Patient's self-stated goal is: unstated    Goal #1 Patient is able to demonstrate supine - sit EOB @ level: minimum assistance     Goal #1   Current Status    Goal #2 Patient is able to demonstrate transfers Sit to/from Stand at assistance level: minimum assistance with walker - rolling     Goal #2  Current Status    Goal #3 Patient is able to ambulate 15 feet with assist device: walker - rolling at assistance level: minimum assistance   Goal #3   Current Status            Goal #5 Patient to demonstrate independence with home activity/exercise instructions provided to patient in preparation for discharge.   Goal #5   Current Status    Goal #6    Goal #6  Current Status      Patient Evaluation Complexity Level:  History Moderate - 1 or 2 personal factors and/or co-morbidities   Examination of body systems Moderate - addressing a total of 3 or more elements   Clinical Presentation  Moderate - Evolving   Clinical Decision Making  Moderate Complexity     Therapeutic Activity:  13 minutes

## 2024-05-07 NOTE — CONSULTS
[facility] RN note per Leola Araya RN    Hospice consult received.  Will be meeting patient and spoue at bedside shortly.    Leola Araya RN  [facility]

## 2024-05-07 NOTE — ED PROVIDER NOTES
Patient Seen in: [facility] Emergency Department    History     Chief Complaint   Patient presents with    Fatigue       HPI    History is provided by patient/independent historian: Patient, patient's   68 year old female with history of stage IV brain cancer, here with generalized weakness.  Has been reports being unable to transfer her into bed today.  She has not been eating because she has no appetite.  He has been trying to give her protein shakes, but she is not tolerating them because she does not want them.  No associated unilateral weakness.  No new trauma.  She recently had a brain scan.  No fevers or chills.  Patient denies any significant pain.    History reviewed.   Past Medical History:    Abnormality of gait    BRCA1 negative    BRCA2 negative    Decreased range of motion of left knee    Decreased strength of lower extremity    Infected prosthetic knee joint (HCC)    Muscle weakness    Osteoarthritis    PONV (postoperative nausea and vomiting)    profound nausea and vomiting    Pre-op exam    Visual impairment    glasses         History reviewed.   Past Surgical History:   Procedure Laterality Date    Benign biopsy left  2018    stereotactic biopsy, benign          Gastric bypass,obese<100cm mane-en-y      Hysterectomy      Ovaries removed as well - age 44    Hysteroscopy      Knee replacement surgery      Cleoulatadamaris knee jt+anesthesia Right 2017    Dr Thornton    Manipulatadamaris knee jt+anesthesia Left 2018    Dr Gutierrez    Total knee replacement Left 2017    Dr Gutierrez    Tubal ligation           Home Medications reviewed :  (Not in a hospital admission)        History reviewed.   Social History     Socioeconomic History    Marital status:    Tobacco Use    Smoking status: Never    Smokeless tobacco: Never   Vaping Use    Vaping status: Never Used   Substance and Sexual Activity    Alcohol use: Not Currently    Drug use: No    Sexual activity: Yes      Partners: Male         ROS  Review of Systems   Constitutional:  Positive for fatigue.   Respiratory:  Negative for shortness of breath.    Cardiovascular:  Negative for chest pain.   All other systems reviewed and are negative.     All other pertinent organ systems are reviewed and are negative.      Physical Exam     ED Triage Vitals [05/06/24 2148]   /78   Pulse 112   Resp 18   Temp 97.6 °F (36.4 °C)   Temp src Oral   SpO2 97 %   O2 Device None (Room air)     Vital signs reviewed.      Physical Exam  Vitals and nursing note reviewed.   Constitutional:       Appearance: She is ill-appearing (chronically).   Cardiovascular:      Pulses: Normal pulses.   Pulmonary:      Effort: No respiratory distress.   Abdominal:      General: There is no distension.   Neurological:      Mental Status: She is alert.         ED Course       Labs:     Labs Reviewed   BASIC METABOLIC PANEL (8) - Abnormal; Notable for the following components:       Result Value    Glucose 138 (*)     Potassium 5.8 (*)     Chloride 115 (*)     CO2 18.0 (*)     Creatinine 1.31 (*)     Calculated Osmolality 300 (*)     eGFR-Cr 45 (*)     All other components within normal limits   CBC W/ DIFFERENTIAL - Abnormal; Notable for the following components:    RDW-SD 59.0 (*)     RDW 17.3 (*)     Lymphocyte Absolute 0.70 (*)     All other components within normal limits   CBC WITH DIFFERENTIAL WITH PLATELET    Narrative:     The following orders were created for panel order CBC With Differential With Platelet.                  Procedure                               Abnormality         Status                                     ---------                               -----------         ------                                     CBC W/ DIFFERENTIAL[[id number]]          Abnormal            Final result                                                 Please view results for these tests on the individual orders.   URINALYSIS WITH CULTURE REFLEX   RAINBOW DRAW  LAVENDER   RAINBOW DRAW LIGHT GREEN   RAINBOW DRAW BLUE   RAINBOW DRAW GOLD         My EKG Interpretation:   My interpretation of EKG: rate 145, rhythm sinus, axis normal, no acute ST changes  Interpretation: abnormal for rate, normal for rhythm      As reviewed and Interpreted by me      Imaging Results Available and Reviewed while in ED:   No results found.      Decision rules/scores evaluated: none      Diagnostic labs/tests considered but not ordered: CT brain    ED Medications Administered:   Medications   sodium chloride 0.9 % IV bolus 1,000 mL (1,000 mL Intravenous New Bag 5/6/24 3893)   calcium gluconate 1g in 100mL iso-NaCl IVPB premix (has no administration in time range)   dextrose 50% injection 50 mL (has no administration in time range)   insulin regular human (Novolin R, Humulin R) 100 UNIT/ML injection 10 Units (has no administration in time range)   sodium bicarbonate injection 50 mEq (has no administration in time range)                MDM       Medical Decision Making      Differential Diagnosis: After obtaining the patient's history, performing the physical exam and reviewing the diagnostics, multiple initial diagnoses were considered based on the presenting problem including fall, sandy, anemia, electrolyte abnormality    External document review: I personally reviewed available external medical records for any recent pertinent discharge summaries, testing, and procedures - the findings are as follows: 4/29/24 visit with Dr. Jimenez rad onc    Complicating Factors: The patient already  has a past medical history of Abnormality of gait (12/18/2017), BRCA1 negative (2018), BRCA2 negative (2018), Decreased range of motion of left knee (5/11/2018), Decreased strength of lower extremity (9/10/2019), Infected prosthetic knee joint (HCC), Muscle weakness, Osteoarthritis, PONV (postoperative nausea and vomiting), Pre-op exam (10/19/2017), and Visual impairment. to contribute to the complexity of this ED  evaluation.    Procedures performed: none    Discussed management with physician/appropriate source: Dr. Prem eastman with immediate hyperkalemia treatment with IVF    Considered admission/deescalation of care for: weakness    Social determinants of health affecting patient care: none    Prescription medications considered: discussed continuing current medication regimen    The patient requires continuous monitoring for: weakness    Shared decision making: discussed possible admission    Critical Care Time:  Total critical care time for this patient was 30 minutes exclusive of separately billable procedures, but in obtaining history, performing a physical exam, bedside monitoring of interventions, collecting and interpreting test, and discussion with consultants.      Disposition and Plan     Clinical Impression:  1. Weakness generalized    2. Hyperkalemia        Disposition:  Admit    Follow-up:  No follow-up provider specified.    Medications Prescribed:  Current Discharge Medication List          Hospital Problems       Present on Admission  Date Reviewed: 9/6/2023            ICD-10-CM Noted POA    * (Principal) Weakness generalized R53.1 5/6/2024 Unknown

## 2024-05-07 NOTE — PLAN OF CARE
Problem: Patient Centered Care  Goal: Patient preferences are identified and integrated in the patient's plan of care  Description: Interventions:  - What would you like us to know as we care for you?   - Provide timely, complete, and accurate information to patient/family  - Incorporate patient and family knowledge, values, beliefs, and cultural backgrounds into the planning and delivery of care  - Encourage patient/family to participate in care and decision-making at the level they choose  - Honor patient and family perspectives and choices  Outcome: Progressing     Patient is alert to self.  Patient has suddenly beome very weak and unable to walk with walker or get into a wheel chair.  Patient has primo fit in place, incontinent x 2.  Patient is on keppra for seizure activity.  Patient has history of brain CA.  PT/OT to evaluate.  Patient has personal belongings and call light within reach.  Safety measures in place.

## 2024-05-07 NOTE — PROGRESS NOTES
Patient alert and oriented X2, vitals stable. Denies pain. Continues with IVFs. Seizure precaution maintained. Sent for CT brain, returned stable.  at bedside met with hospice and signed consents for hospice at home with discharge on Thursday. POLST placed on chart awaiting physicians signature. Bed locked and in lowest position, call light within reach, bed and chair alarm active for added safety, frequent rounds made.

## 2024-05-07 NOTE — OCCUPATIONAL THERAPY NOTE
OCCUPATIONAL THERAPY EVALUATION - INPATIENT     Room Number: 471/471-A  Evaluation Date: 5/7/2024  Type of Evaluation: Initial  Presenting Problem: weakness,memory deficits    Physician Order: IP Consult to Occupational Therapy  Reason for Therapy: ADL/IADL Dysfunction and Discharge Planning    OCCUPATIONAL THERAPY ASSESSMENT   Patient is a 68 year old female admitted 5/6/2024 for weakness.  Prior to admission, patient was at home w/ her . Pt unable to provide information regarding plf.  Patient is currently functioning below baseline with adls and functional mobility.  Patient is requiring moderate assist as a result of the following impairments: decreased functional strength, impaired motor planning, and decreased visual spatial awareness. Occupational Therapy will continue to follow for duration of hospitalization.    Patient will benefit from continued skilled OT Services at discharge to promote functional independence and safety with additional support and return home with home health OT    PLAN  OT Treatment Plan: Patient/Family training;Patient/Family education;Endurance training;Functional transfer training  OT Device Recommendations: TBD    OCCUPATIONAL THERAPY MEDICAL/SOCIAL HISTORY   Problem List  Principal Problem:    Weakness generalized  Active Problems:    Hyperkalemia    HOME SITUATION  Type of Home: House  Home Layout: One level  Lives With: Spouse  Toilet and Equipment: Standard height toilet  Shower/Tub and Equipment: Tub-shower combo  Other Equipment: -- (sc,rw,wc)  Drives: No  Patient Regularly Uses: None    Stairs in Home: unknown  Use of Assistive Device(s): pt has rw and wc    Prior Level of [place]: Prior to admission, patient was at home w/ her . Pt unable to provide information regarding plf    SUBJECTIVE  Pt agreeable to activity    OCCUPATIONAL THERAPY EXAMINATION    OBJECTIVE  Precautions: Seizure  Fall Risk: High fall risk    PAIN ASSESSMENT  Rating: Unable to  rate  Location: -- (Rle)  Management Techniques: Relaxation; Repositioning    ACTIVITY TOLERANCE  Fair, pt easily fatigued  HR 140s on tele w/ activity;109 at rest    O2 SATURATIONS  Activity on room air    VISION  Pt appeared to have decreased visual acuity    PERCEPTION  Pt presenting w/ decreased depth perception and poor spatial awareness when attempting to hold coffee cup and manipulate a straw    Communication: pt soft spoken and offering limited conversation    Behavioral/Emotional/Social: cooperative    RANGE OF MOTION   Upper extremity ROM is within functional limits     STRENGTH ASSESSMENT  Upper extremity strength is grossly fair    ACTIVITIES OF DAILY LIVING ASSESSMENT  AM-PAC ‘6-Clicks’ Inpatient Daily Activity Short Form  How much help from another person does the patient currently need…  -   Putting on and taking off regular lower body clothing?: A Lot  -   Bathing (including washing, rinsing, drying)?: A Lot  -   Toileting, which includes using toilet, bedpan or urinal? : A Lot  -   Putting on and taking off regular upper body clothing?: A Lot  -   Taking care of personal grooming such as brushing teeth?: A Lot  -   Eating meals?: A Lot    AM-PAC Score:  Score: 12  Approx Degree of Impairment: 66.57%  Standardized Score (AM-PAC Scale): 30.6  CMS Modifier (G-Code): CL    FUNCTIONAL ADL ASSESSMENT  Eating: mod assist  Grooming: mod assist  UB Dressing: mod assist  LB Dressing: max assist  Toileting: max assist    Skilled Therapy Provided: RN contacted prior to start of care. Treatment coordinated w/ PT. Pt agreeable to participation in therapy. Gait belt used during dynamic activity. Pt received in bed, alert and oriented to self. On initial contact pt required mod a x 2 for supine<>sit at eob. Pt maintained unsupported sitting w/ close supervision. Pt required mod a for sit<>stand from eob. Pt transferred 2' bed<>chair w/ rw and mod a 1 to 1;vc and physical assist for walker management. Pt required  max a to abhijeet socks prior to activity. Pt required mod a for set up to grasp and sip coffe through a straw.      At end of session pt remaing in up in chair w/ all needs in reach w/ all needs in reach and alarm on;family at bedside. RN aware of pt's status and performance in therapy. Lift recommended for return to bed      EDUCATION PROVIDED  Patient: Role of Occupational Therapy; Plan of Care; Functional Transfer Techniques; Fall Prevention  Patient's Response to Education: Requires Further Education    The patient's Approx Degree of Impairment: 66.57% has been calculated based on documentation in the [facility] '6 clicks' Inpatient Daily Activity Short Form.  Research supports that patients with this level of impairment may benefit from IRF. Per chart, plan is home w/ Hospice  Final disposition will be made by interdisciplinary medical team.     Patient End of Session: Up in chair;Needs met;Call light within reach;RN aware of session/findings;All patient questions and concerns addressed;Alarm set;Family present    OT Goals  Patients self stated goal is: unstated     Patient will complete functional transfer with mod a x 1  Comment:     Patient will complete self care task w/ min a and vc   Comment:     Patient will tolerate standing for 2 minutes in prep for adls with min a   Comment:              Goals  on: 24  Frequency: 3x/week    Patient Evaluation Complexity Level:   Occupational Profile/Medical History MODERATE - Expanded review of history including review of medical or therapy record   Specific performance deficits impacting engagement in ADL/IADL MODERATE  3 - 5 performance deficits   Client Assessment/Performance Deficits MODERATE - Comorbidities and min to mod modifications of tasks    Clinical Decision Making MODERATE - Analysis of occupational profile, detailed assessments, several treatment options    Overall Complexity MODERATE     Therapeutic Activity: 15 minutes

## 2024-05-07 NOTE — CM/SW NOTE
CM/SW Screening   Referral Source    Information Source [facility] staff;Chart review;Nursing rounds   Patient's Current Mental Status at Time of Assessment Memory Impairments   Patient's Home Environment House   Number of Levels in Home 1   Patient lives with Spouse/Significant other   Patient Status Prior to Admission   Independent with ADLs and Mobility No   Pt. requires assistance with Housework;Driving;Meals;Ambulating   Services in place prior to admission DME/Supplies at home   Type of DME/Supplies Straight Cane;Wheeled Walker;Wheelchair      Pt is familiar to CM from previous admission.    At last dc pt declined services and was dc home with spouse.    Currently admitted with new onset weakness, poor appetite.    5/7 1500  MDO for hospice  CM called [facility] with above    5/8 0730  [facility] is not in pts service area. Re referred to [facility], poly notified    1120  Liaison met with spouse, plan is for home with hospice tomorrow at 11am    CM secured amb, pcs done    MD/APN/RN updated with plan and CM req POLST be completed and signed prior to dc    Plan  Home w/ [facility]    / to remain available for support and/or discharge planning.     Samina Drake, RN    Ext [phone]

## 2024-05-07 NOTE — H&P
[facility]  part of [facility] Hospitalist H&P     CC:   Chief Complaint   Patient presents with    Fatigue        PCP: Ciera Disla MD      Assessment and Plan     Mayelin Jimenez is a 68 year old female with PMH sig for glioblastoma diagnosed approximately 10 months ago status post craniotomy, radiation and chemotherapy with Avastin and Temodar with recent hospitalization at [facility] Mid  April with seizures and progression of her known cancer.  Patient was to restart Avastin and Temodar to 100 mg instead of the previous 150 per last oncology note on 4/23/2024.  Avastin was scheduled to start today and Temodar is waiting at the pharmacy.  Patient with progressive weakness over the last few days, now unable to ambulate.  No overt signs of infection and patient continues to decline.  Family unable to care for her at home and given the overall poor prognosis and inability to tolerate further treatment at this time decision made to meet with hospice.  Per discussion with RN would like to be DNAR comfort.  Primary oncologist was messaged via alert MD on 5/7/2024    Glioblastoma  Progressive weakness  Seizures secondary to tumor burden  - Recent progression mid April 2024 presenting with seizures  - Outpatient MRI shows progression  - Repeat CT here without bleed or other new finding other than known progression  - UA negative for infection and no other localizing symptoms  - Given the profound weakness not a candidate for the Temodar or Avastin at this time per curbside discussion with  -now  unable to ambulate, Temodar and Avastin were never restarted  -Met with hospice, plan for home hospice  -Continue Keppra    Intermittent sinus tachycardia no hypoxia  - With activity, patient is need to transition to comfort measures, no further workup at this time    Hypernatremia  - Can continue fluids but further labs unhelpful as patient is transitioning to comfort measures, will discuss  with family how they like to proceed    Dispo: Plan for home with hospice when family ready and equipment delivered    PPx: Patient now going hospice, will hold AC      Outpatient records reviewed confirming patient's medical history and medications.     Further recommendations pending patient's clinical course.  [facility] hospitalist to continue to follow patient while in house    Patient and/or patient's family given opportunity to ask questions and note understanding and agreeing with therapeutic plan as outlined    Discussed with  at bedside    Thank You,  Carmen Graham MD  [facility] Hospitalist     Answering Service number: [phone]    HPI     Mayelin Jimenez is a 68 year old female with PMH sig for glioblastoma diagnosed approximately 10 months ago status post craniotomy, radiation and chemotherapy with Avastin and Temodar with recent hospitalization at [facility] Mid  April with seizures and progression of her known cancer.  Patient was to restart Avastin and Temodar to 100 mg instead of the previous 150 per last oncology note on 4/23/2024.  Avastin was scheduled to start today and Temodar is waiting at the pharmacy.  Patient with progressive weakness over the last few days, now unable to ambulate.  No overt signs of infection and patient continues to decline.  Family unable to care for her at home and given the overall poor prognosis and inability to tolerate further treatment at this time decision made to meet with hospice.  Per discussion with RN would like to be DNAR comfort.  Primary oncologist was messaged via alert MD on 5/7/2024    She is awake and alert and cooperative but cannot answer any detailed questions.  She denies current pain.  Does not appear to be in any distress.  UA negative for infection.  No respiratory symptoms.    Review of Systems  She denies pain but unable to fully obtain    History     PMH  Past Medical History:    Abnormality of gait    BRCA1 negative    BRCA2 negative     Decreased range of motion of left knee    Decreased strength of lower extremity    Infected prosthetic knee joint (HCC)    Muscle weakness    Osteoarthritis    PONV (postoperative nausea and vomiting)    profound nausea and vomiting    Pre-op exam    Visual impairment    glasses        PSH  Past Surgical History:   Procedure Laterality Date    Benign biopsy left  2018    stereotactic biopsy, benign          Gastric bypass,obese<100cm mane-en-y      Hysterectomy      Ovaries removed as well - age 44    Hysteroscopy      Knee replacement surgery      Kiki knee jt+anesthesia Right 2017    Dr Thornton    Manipulatadamaris knee jt+anesthesia Left 2018    Dr Gutierrez    Total knee replacement Left 2017    Dr Gutierrez    Tubal ligation          ALL:  Allergies   Allergen Reactions    Nickel HIVES        Home Medications:  Outpatient Medications Marked as Taking for the 24 encounter (Hospital Encounter)   Medication Sig Dispense Refill    levETIRAcetam 500 MG Oral Tab Take 1 tablet (500 mg total) by mouth 2 (two) times daily.      HYDROcodone-acetaminophen 5-325 MG Oral Tab Take 1 tablet by mouth every 6 (six) hours as needed. 10 tablet 0    dexamethasone (DECADRON) 4 MG tablet Take 1 tablet (4 mg total) by mouth every 12 (twelve) hours. 60 tablet 1    Acetaminophen Extra Strength 500 MG Oral Tab Take 1 tablet by mouth every 6 (six) hours as needed. 100 tablet 0    folic acid 1 MG Oral Tab Take 1 tablet (1 mg total) by mouth daily. 30 tablet 2    Multiple Vitamins-Minerals (MULTIVITAMIN ADULT OR) Take by mouth.      Ergocalciferol (VITAMIN D OR) Take by mouth.         Soc Hx  Social History     Tobacco Use    Smoking status: Never    Smokeless tobacco: Never   Substance Use Topics    Alcohol use: Not Currently        Fam Hx  Family History   Problem Relation Age of Onset    Hypertension Father     Cancer Sister         breast    Breast Cancer Sister 30        age at dx 30    Diabetes Brother      Breast Cancer Sister 33        age at dx 33    Breast Cancer Maternal Grandmother         unknown           Objective     Temp: 97.9 °F (36.6 °C)  Pulse: 140  Resp: 18  BP: 121/85    Exam  Gen: No acute distress, alert and oriented xself, family, otherwise cannot give detailed history  Neck Supple, no JVD  Pulm: Lungs clear, normal respiratory effort, No wheezing or crackles  CV: Regular but tacky  Abd: Abdomen soft, nontender, nondistended, no organomegaly, bowel sounds present  MSK:  no clubbing, no cyanosis.  No Lower extremity edema  Skin: no rashes or lesions, well perfused  Psych: mood stable, cooperative  Neuro: Stable gait, cannot safely ambulate myself.    Diagnostic Data:    CBC/Chem  Recent Labs   Lab 05/06/24 2218 05/07/24  0702   WBC 8.8 7.4   HGB 14.3 12.6   MCV 92.6 92.2   .0 161.0       Recent Labs   Lab 05/06/24 2218 05/07/24  0714 05/07/24  1354    148* 148*   K 5.8* 5.1 4.6   * 119* 119*   CO2 18.0* 20.0* 21.0   BUN 17 14 13   CREATSERUM 1.31* 1.02 0.94   * 138* 127*   CA 9.5 8.7 8.5*       No results for input(s): \"ALT\", \"AST\", \"ALB\", \"AMYLASE\", \"LIPASE\", \"LDH\" in the last 168 hours.    Invalid input(s): \"ALPHOS\", \"TBIL\", \"DBIL\", \"TPROT\"    No results for input(s): \"TROP\" in the last 168 hours.         Radiology: CT BRAIN OR HEAD (74739)    Result Date: 5/7/2024  PROCEDURE: CT BRAIN OR HEAD (CPT=70450)  COMPARISON: [facility], MRI BRAIN (W+WO) (CPT=70553), 7/24/2023, 8:25 PM.  [facility], CT BRAIN OR HEAD (CPT=70450), 11/06/2023, 3:20 AM.  INDICATIONS: weakness  TECHNIQUE: CT images were obtained without contrast material.  Automated exposure control for dose reduction was used.  Dose information is transmitted to the ACR (American College of Radiology) NRDR (National Radiology Data Registry) which includes the Dose Index Registry.  FINDINGS:  CSF SPACES: Ex vacuo dilatation of the posterior horn of the left lateral ventricle.   Remaining ventricles are normal in size and configuration.  Basal cisterns are patent.  No abnormal extra-axial fluid collection. CEREBRUM: There is a lobulated 12 mm diameter partially calcified focus in the posterior left frontal lobe near the vertex, corresponding to site of 15 x 22 mm hypodense change on the 11/6/23 CT brain.  There is less advanced vasogenic edema surrounding this finding, and there are new 10 x 18 and 11 x 18 mm hypodense foci in the left corona radiata with surrounding vasogenic edema, as well as encephalomalacia with volume loss and calcific densities within the left occipital lobe.  There is loss of normal sulcation and gyral pattern in the left frontal and parietal lobes near the vertex.  No midline shift. CEREBELLUM: No edema, hemorrhage, mass, acute infarction, or significant atrophy.  BRAINSTEM: No edema, hemorrhage, mass, acute infarction, or significant atrophy.  CALVARIUM: Posterior left parietal craniotomy/emilee hole defect with overlying plate. SINUSES: Limited views demonstrate no significant mucosal thickening or fluid.  ORBITS: Limited views are unremarkable.   OTHER: There is calcific atherosclerosis in the cavernous segments of the internal carotid arteries and vertebral arteries.          CONCLUSION:  1.  There has been prior left parietal craniotomy.  There is diffuse edema across the left frontal-parietal lobes most prominent in the vertex.  In the left vertex there is a 12 mm lobulated soft tissue density focus which is new since 11/6/23, and this finding may represent a treated lesion at the edema in this region has decreased and there is previously a hypodense focus in this region.  However, there is ongoing edema within the corona radiata and there are 2 hypodense ovoid foci in this region measuring 10 x 18 and 11 x 18 mm, respectively which are new since previous exam.  There is also chronic encephalomalacia with volume loss in the left occipital lobe, which also  contains punctate calcific densities.  Calcific densities are new since prior exam and may represent areas of blood-brain barrier breakdown with petechial hemorrhage, granulomatous calcifications, or blood products.  No midline shift.  These findings can be better assessed with MRI brain with and without contrast.     Dictated by (CST): Glenn Cabrera MD on 5/07/2024 at 1:03 PM     Finalized by (CST): Glenn Cabrera MD on 5/07/2024 at 1:08 PM

## 2024-05-08 LAB
ANION GAP SERPL CALC-SCNC: 8 MMOL/L (ref 0–18)
ATRIAL RATE: 91 BPM
BUN BLD-MCNC: 9 MG/DL (ref 9–23)
BUN/CREAT SERPL: 11.1 (ref 10–20)
CALCIUM BLD-MCNC: 8.1 MG/DL (ref 8.7–10.4)
CHLORIDE SERPL-SCNC: 117 MMOL/L (ref 98–112)
CO2 SERPL-SCNC: 20 MMOL/L (ref 21–32)
CREAT BLD-MCNC: 0.81 MG/DL
EGFRCR SERPLBLD CKD-EPI 2021: 80 ML/MIN/1.73M2 (ref 60–?)
GLUCOSE BLD-MCNC: 121 MG/DL (ref 70–99)
OSMOLALITY SERPL CALC.SUM OF ELEC: 300 MOSM/KG (ref 275–295)
P AXIS: -12 DEGREES
P-R INTERVAL: 108 MS
POTASSIUM SERPL-SCNC: 4.6 MMOL/L (ref 3.5–5.1)
Q-T INTERVAL: 372 MS
QRS DURATION: 56 MS
QTC CALCULATION (BEZET): 457 MS
R AXIS: 5 DEGREES
SODIUM SERPL-SCNC: 145 MMOL/L (ref 136–145)
T AXIS: 52 DEGREES
VENTRICULAR RATE: 91 BPM

## 2024-05-08 PROCEDURE — 80048 BASIC METABOLIC PNL TOTAL CA: CPT | Performed by: HOSPITALIST

## 2024-05-08 NOTE — PROGRESS NOTES
[facility]  [facility] Progress Note     PCP: Ciera Disla MD    CC: Follow up       Assessment/Plan:     Mayelin Jimenez is a 68 year old female with PMH sig for glioblastoma diagnosed approximately 10 months ago status post craniotomy, radiation and chemotherapy with Avastin and Temodar with recent hospitalization at [facility]  April with seizures and progression of her known cancer.  Patient was to restart Avastin and Temodar to 100 mg instead of the previous 150 per last oncology note on 4/23/2024.  Avastin was scheduled to start today and Temodar is waiting at the pharmacy.  Patient with progressive weakness over the last few days, now unable to ambulate.  No overt signs of infection and patient continues to decline.  Family unable to care for her at home and given the overall poor prognosis and inability to tolerate further treatment at this time decision made to meet with hospice.  Per discussion with RN would like to be DNAR comfort.  Primary oncologist was messaged via alert MD on 5/7/2024.  PCP messaged with update on 5/8/2024     Glioblastoma  Progressive weakness  Seizures secondary to tumor burden  - Recent progression mid April 2024 presenting with seizures  - Outpatient MRI shows progression  - Repeat CT here without bleed or other new finding other than known progression  - UA negative for infection and no other localizing symptoms.  Culture negative  - Given the profound weakness not a candidate for the Temodar or Avastin at this time per curbside discussion with  -now  unable to ambulate, Temodar and Avastin were never restarted  -Met with hospice, plan for home hospice  -Continue Keppra     Intermittent sinus tachycardia no hypoxia  - With activity, patient is need to transition to comfort measures, no further workup at this time     Hypernatremia  - Can continue fluids but further labs unhelpful as patient is transitioning to comfort  measures, will discuss with family how they like to proceed     Dispo: Plan for home with hospice when family ready and equipment delivered     PPx: Patient now going hospice, will hold AC        Further recommendations pending patient's clinical course.  [facility] hospitalist to continue to follow patient while in house     Patient and/or patient's family given opportunity to ask questions and note understanding and agreeing with therapeutic plan as outlined     Discussed with  at bedside on 5/7/2024.  Plan will be home with hospice either tomorrow or Friday depending on equipment delivery.     Thank You,  Carmen Graham MD  [facility] Hospitalist     Answering Service number: [phone]     Subjective     No complaints, we discussed that she will likely go home tomorrow or Friday depending on when the equipment will be delivered.  Residential hospice does not service her area and will need to meet with a new hospice agency later today or tomorrow.  No CP, SOB, or N/V.      Objective     OBJECTIVE:  Temp:  [97.9 °F (36.6 °C)-98.2 °F (36.8 °C)] 97.9 °F (36.6 °C)  Pulse:  [67-77] 73  Resp:  [16-18] 18  BP: (121-132)/(79-89) 121/79  SpO2:  [97 %-100 %] 100 %    Intake/Output:    Intake/Output Summary (Last 24 hours) at 5/8/2024 1517  Last data filed at 5/8/2024 1226  Gross per 24 hour   Intake 0 ml   Output 400 ml   Net -400 ml       Last 3 Weights   05/07/24 0053 175 lb 8 oz (79.6 kg)   05/07/24 0050 175 lb 8 oz (79.6 kg)   05/06/24 2148 180 lb (81.6 kg)   02/17/24 1218 200 lb (90.7 kg)   11/06/23 0628 208 lb 12.8 oz (94.7 kg)   11/06/23 0600 208 lb 12.8 oz (94.7 kg)   11/06/23 0251 207 lb (93.9 kg)       Exam  Gen: No acute distress, alert and cooperative but confused  Neck Supple, no JVD  Pulm: Lungs clear, normal respiratory effort, No wheezing or crackles  CV: Heart with regular rate and rhythm, No murmurs, rubs, gallops  Abd: Abdomen soft, nontender, nondistended, no organomegaly, bowel sounds present  MSK:  no  clubbing, no cyanosis.  No Lower extremity edema  Skin: no rashes or lesions, well perfused  Psych: mood stable, cooperative  Neuro: no new focal deficits    Medications      dexamethasone  4 mg Oral 2 times per day    folic acid  1 mg Oral Daily    levETIRAcetam  500 mg Oral BID      dextrose 5%-sodium chloride 0.45% 75 mL/hr at 05/07/24 0800       acetaminophen    polyethylene glycol (PEG 3350)    sennosides    bisacodyl    fleet enema    ondansetron    metoclopramide    HYDROcodone-acetaminophen    Data Review:       Labs:     Recent Labs   Lab 05/06/24 2218 05/07/24  0702   WBC 8.8 7.4   HGB 14.3 12.6   MCV 92.6 92.2   .0 161.0       Recent Labs   Lab 05/06/24 2218 05/07/24  0714 05/07/24  1354 05/08/24  0643    148* 148* 145   K 5.8* 5.1 4.6 4.6   * 119* 119* 117*   CO2 18.0* 20.0* 21.0 20.0*   BUN 17 14 13 9   CREATSERUM 1.31* 1.02 0.94 0.81   CA 9.5 8.7 8.5* 8.1*   * 138* 127* 121*       No results for input(s): \"ALT\", \"AST\", \"ALB\", \"AMYLASE\", \"LIPASE\", \"LDH\" in the last 168 hours.    Invalid input(s): \"ALPHOS\", \"TBIL\", \"DBIL\", \"TPROT\"    Recent Labs   Lab 05/06/24  2327 05/07/24  0016   PGLU 111* 129*       No results for input(s): \"TROP\" in the last 168 hours.    Imaging:  CT BRAIN OR HEAD ([id number])    Result Date: 5/7/2024  PROCEDURE: CT BRAIN OR HEAD (CPT=70450)  COMPARISON: [facility], MRI BRAIN (W+WO) (CPT=70553), 7/24/2023, 8:25 PM.  [facility], CT BRAIN OR HEAD (CPT=70450), 11/06/2023, 3:20 AM.  INDICATIONS: weakness  TECHNIQUE: CT images were obtained without contrast material.  Automated exposure control for dose reduction was used.  Dose information is transmitted to the ACR (American College of Radiology) NRDR (National Radiology Data Registry) which includes the Dose Index Registry.  FINDINGS:  CSF SPACES: Ex vacuo dilatation of the posterior horn of the left lateral ventricle.  Remaining ventricles are normal in size and configuration.   Basal cisterns are patent.  No abnormal extra-axial fluid collection. CEREBRUM: There is a lobulated 12 mm diameter partially calcified focus in the posterior left frontal lobe near the vertex, corresponding to site of 15 x 22 mm hypodense change on the 11/6/23 CT brain.  There is less advanced vasogenic edema surrounding this finding, and there are new 10 x 18 and 11 x 18 mm hypodense foci in the left corona radiata with surrounding vasogenic edema, as well as encephalomalacia with volume loss and calcific densities within the left occipital lobe.  There is loss of normal sulcation and gyral pattern in the left frontal and parietal lobes near the vertex.  No midline shift. CEREBELLUM: No edema, hemorrhage, mass, acute infarction, or significant atrophy.  BRAINSTEM: No edema, hemorrhage, mass, acute infarction, or significant atrophy.  CALVARIUM: Posterior left parietal craniotomy/emilee hole defect with overlying plate. SINUSES: Limited views demonstrate no significant mucosal thickening or fluid.  ORBITS: Limited views are unremarkable.   OTHER: There is calcific atherosclerosis in the cavernous segments of the internal carotid arteries and vertebral arteries.          CONCLUSION:  1.  There has been prior left parietal craniotomy.  There is diffuse edema across the left frontal-parietal lobes most prominent in the vertex.  In the left vertex there is a 12 mm lobulated soft tissue density focus which is new since 11/6/23, and this finding may represent a treated lesion at the edema in this region has decreased and there is previously a hypodense focus in this region.  However, there is ongoing edema within the corona radiata and there are 2 hypodense ovoid foci in this region measuring 10 x 18 and 11 x 18 mm, respectively which are new since previous exam.  There is also chronic encephalomalacia with volume loss in the left occipital lobe, which also contains punctate calcific densities.  Calcific densities are new  since prior exam and may represent areas of blood-brain barrier breakdown with petechial hemorrhage, granulomatous calcifications, or blood products.  No midline shift.  These findings can be better assessed with MRI brain with and without contrast.     Dictated by (CST): Glenn Cabrera MD on 5/07/2024 at 1:03 PM     Finalized by (CST): Glenn Cabrera MD on 5/07/2024 at 1:08 PM

## 2024-05-08 NOTE — PAYOR COMM NOTE
--------------  STATUS CHANGED TO OBSERVATION ON        Order Requisition    Place in observation Once (Order #[id number]) on 24       ADMISSION REVIEW     Payor: [employer] [facility]  Subscriber #:  [id number]  Authorization Number: [id number]    Admit date: 24  Admit time: 12:41 AM       Patient Seen in: [facility] Emergency Department    History     Chief Complaint   Patient presents with    Fatigue       HPI    History is provided by patient/independent historian: Patient, patient's   68 year old female with history of stage IV brain cancer, here with generalized weakness.  Has been reports being unable to transfer her into bed today.  She has not been eating because she has no appetite.  He has been trying to give her protein shakes, but she is not tolerating them because she does not want them.  No associated unilateral weakness.  No new trauma.  She recently had a brain scan.  No fevers or chills.  Patient denies any significant pain.    History reviewed.   Past Medical History:    Abnormality of gait    BRCA1 negative    BRCA2 negative    Decreased range of motion of left knee    Decreased strength of lower extremity    Infected prosthetic knee joint (HCC)    Muscle weakness    Osteoarthritis    PONV (postoperative nausea and vomiting)    profound nausea and vomiting    Pre-op exam    Visual impairment    glasses         History reviewed.   Past Surgical History:   Procedure Laterality Date    Benign biopsy left  2018    stereotactic biopsy, benign          Gastric bypass,obese<100cm mane-en-y      Hysterectomy      Ovaries removed as well - age 44    Hysteroscopy      Knee replacement surgery      Manipulatn knee jt+anesthesia Right 2017    Dr Thornton    Manipulatadamaris knee jt+anesthesia Left 2018    Dr Gutierrez    Total knee replacement Left 2017    Dr Gutierrez    Tubal ligation           Home Medications reviewed :  (Not in a hospital admission)        History  reviewed.   Social History     Socioeconomic History    Marital status:    Tobacco Use    Smoking status: Never    Smokeless tobacco: Never   Vaping Use    Vaping status: Never Used   Substance and Sexual Activity    Alcohol use: Not Currently    Drug use: No    Sexual activity: Yes     Partners: Male         ROS  Review of Systems   Constitutional:  Positive for fatigue.   Respiratory:  Negative for shortness of breath.    Cardiovascular:  Negative for chest pain.   All other systems reviewed and are negative.     All other pertinent organ systems are reviewed and are negative.      Physical Exam     ED Triage Vitals [05/06/24 2148]   /78   Pulse 112   Resp 18   Temp 97.6 °F (36.4 °C)   Temp src Oral   SpO2 97 %   O2 Device None (Room air)     Vital signs reviewed.      Physical Exam  Vitals and nursing note reviewed.   Constitutional:       Appearance: She is ill-appearing (chronically).   Cardiovascular:      Pulses: Normal pulses.   Pulmonary:      Effort: No respiratory distress.   Abdominal:      General: There is no distension.   Neurological:      Mental Status: She is alert.         ED Course       Labs:     Labs Reviewed   BASIC METABOLIC PANEL (8) - Abnormal; Notable for the following components:       Result Value    Glucose 138 (*)     Potassium 5.8 (*)     Chloride 115 (*)     CO2 18.0 (*)     Creatinine 1.31 (*)     Calculated Osmolality 300 (*)     eGFR-Cr 45 (*)     All other components within normal limits   CBC W/ DIFFERENTIAL - Abnormal; Notable for the following components:    RDW-SD 59.0 (*)     RDW 17.3 (*)     Lymphocyte Absolute 0.70 (*)     All other components within normal limits   CBC WITH DIFFERENTIAL WITH PLATELET    Narrative:     The following orders were created for panel order CBC With Differential With Platelet.                  Procedure                               Abnormality         Status                                     ---------                                -----------         ------                                     CBC W/ DIFFERENTIAL[[id number]]          Abnormal            Final result                                                 Please view results for these tests on the individual orders.   URINALYSIS WITH CULTURE REFLEX   RAINBOW DRAW LAVENDER   RAINBOW DRAW LIGHT GREEN   RAINBOW DRAW BLUE   RAINBOW DRAW GOLD         My EKG Interpretation:   My interpretation of EKG: rate 145, rhythm sinus, axis normal, no acute ST changes  Interpretation: abnormal for rate, normal for rhythm      As reviewed and Interpreted by me      Imaging Results Available and Reviewed while in ED:   No results found.      Decision rules/scores evaluated: none      Diagnostic labs/tests considered but not ordered: CT brain    ED Medications Administered:   Medications   sodium chloride 0.9 % IV bolus 1,000 mL (1,000 mL Intravenous New Bag 5/6/24 8017)   calcium gluconate 1g in 100mL iso-NaCl IVPB premix (has no administration in time range)   dextrose 50% injection 50 mL (has no administration in time range)   insulin regular human (Novolin R, Humulin R) 100 UNIT/ML injection 10 Units (has no administration in time range)   sodium bicarbonate injection 50 mEq (has no administration in time range)                MDM       Medical Decision Making      Differential Diagnosis: After obtaining the patient's history, performing the physical exam and reviewing the diagnostics, multiple initial diagnoses were considered based on the presenting problem including fall, sandy, anemia, electrolyte abnormality    External document review: I personally reviewed available external medical records for any recent pertinent discharge summaries, testing, and procedures - the findings are as follows: 4/29/24 visit with Dr. Barbara linton onc    Complicating Factors: The patient already  has a past medical history of Abnormality of gait (12/18/2017), BRCA1 negative (2018), BRCA2 negative (2018), Decreased range  of motion of left knee (5/11/2018), Decreased strength of lower extremity (9/10/2019), Infected prosthetic knee joint (HCC), Muscle weakness, Osteoarthritis, PONV (postoperative nausea and vomiting), Pre-op exam (10/19/2017), and Visual impairment. to contribute to the complexity of this ED evaluation.    Procedures performed: none    Discussed management with physician/appropriate source: Dr. Prem eastman with immediate hyperkalemia treatment with IVF    Considered admission/deescalation of care for: weakness    Social determinants of health affecting patient care: none    Prescription medications considered: discussed continuing current medication regimen    The patient requires continuous monitoring for: weakness    Shared decision making: discussed possible admission    Critical Care Time:  Total critical care time for this patient was 30 minutes exclusive of separately billable procedures, but in obtaining history, performing a physical exam, bedside monitoring of interventions, collecting and interpreting test, and discussion with consultants.      Disposition and Plan     Clinical Impression:  1. Weakness generalized    2. Hyperkalemia        Disposition:  Admit    Follow-up:  No follow-up provider specified.    Medications Prescribed:  Current Discharge Medication List          Hospital Problems       Present on Admission  Date Reviewed: 9/6/2023            ICD-10-CM Noted POA    * (Principal) Weakness generalized R53.1 5/6/2024 Unknown                  Signed by Maykel Linares MD on 5/6/2024 11:18 PM         MEDICATIONS ADMINISTERED IN LAST 1 DAY:  dexamethasone (Decadron) tab 4 mg       Date Action Dose Route User    5/8/2024 0959 Given 4 mg Oral Alessia Ocampo RN    5/7/2024 2032 Given 4 mg Oral Yolanda Marcus RN          folic acid (Folvite) tab 1 mg       Date Action Dose Route User    5/8/2024 1000 Given 1 mg Oral Alessia Ocampo RN          levETIRAcetam (Keppra) tab 500 mg       Date  Action Dose Route User    5/8/2024 1000 Given 500 mg Oral Alessia Ocampo RN    5/7/2024 2033 Given 500 mg Oral Yolanda Marcus, RN            Vitals (last day)       Date/Time Temp Pulse Resp BP SpO2 Weight O2 Device O2 Flow Rate (L/min) [facility]    05/08/24 0533 98 °F (36.7 °C) 67 16 132/89 97 % -- None (Room air) -- CB    05/07/24 2013 98.2 °F (36.8 °C) 77 18 124/79 100 % -- None (Room air) -- CB    05/07/24 1409 -- 140 -- -- -- -- -- -- RW    05/07/24 1355 -- 140 -- -- -- -- -- -- RWA    05/07/24 1107 -- 89 18 121/85 100 % -- None (Room air) -- LM    05/07/24 1021 -- 106 -- -- -- -- -- -- RW    05/07/24 0605 97.9 °F (36.6 °C) 99 18 132/103 99 % -- None (Room air) -- CB    05/07/24 0100 98.4 °F (36.9 °C) 91 19 153/98 99 % -- None (Room air) -- SR    05/07/24 0053 -- -- -- -- -- 175 lb 8 oz -- -- SR    05/07/24 0050 -- -- -- -- -- 175 lb 8 oz -- -- CB    05/07/24 0030 -- 101 23 138/97 99 % -- None (Room air) -- ZE    05/07/24 0015 -- 105 21 138/96 100 % -- None (Room air) -- ZE          CIWA Scores (since admission)       None

## 2024-05-08 NOTE — CM/SW NOTE
Patient failed inpatient criteria. Second level of review completed and supports observation.  UR committee in agreement. Discussed with  who approves observation status.  Observation status and MOON  notice explained  to the patient .Signed copy placed in chart  Order for observation in place.     Debbi RANDOLPH, Utilization Review   Ext [phone]

## 2024-05-09 VITALS
BODY MASS INDEX: 31.1 KG/M2 | TEMPERATURE: 98 F | HEART RATE: 69 BPM | RESPIRATION RATE: 18 BRPM | OXYGEN SATURATION: 100 % | DIASTOLIC BLOOD PRESSURE: 85 MMHG | HEIGHT: 63 IN | SYSTOLIC BLOOD PRESSURE: 130 MMHG | WEIGHT: 175.5 LBS

## 2024-05-09 RX ORDER — DEXAMETHASONE 4 MG/1
4 TABLET ORAL EVERY 12 HOURS SCHEDULED
Qty: 60 TABLET | Refills: 1 | Status: SHIPPED | OUTPATIENT
Start: 2024-05-09

## 2024-05-09 RX ORDER — BISACODYL 10 MG
10 SUPPOSITORY, RECTAL RECTAL
Qty: 10 SUPPOSITORY | Refills: 0 | Status: SHIPPED | OUTPATIENT
Start: 2024-05-09

## 2024-05-09 NOTE — PROGRESS NOTES
Patient alert and oriented X2, vitals stable. Denies pain. Continues with IVFs. Seizure precaution maintained.  at bedside met with [facility] hospice today and signed consents for hospice at home with discharge tomorrow. Voiding via purewick, incontinent BM today. Bed locked and in lowest position, call light within reach, bed and chair alarm active for added safety, frequent rounds made.

## 2024-05-09 NOTE — PLAN OF CARE
Mayelin is alert and oriented x2, able to state her name and  and where she is at, but disoriented to time and situation. Remote tele DC during the shift. On RA. Seizure precautions in place. Taking pills whole with thin liquids, likes her fidencio cola. No complaints of NV. Incontinent, purewick in place, hygiene care provided during the shift and bathed in the morning. 2x-max assist, repos as kaleb and pillow support in bed, lift when up out of bed. Wound are provided to sacrum. No complaints of pain. IVF infusing with D5.45 at 75 ml/hr. Son was present at bedside for support during change of shift. Plan to DC home with hospice around 11AM in the morning, transportation set up. Comfort measures provided. Safety measures in place and call light within reach.     Problem: Patient Centered Care  Goal: Patient preferences are identified and integrated in the patient's plan of care  Description: Interventions:  - What would you like us to know as we care for you? From home with   - Provide timely, complete, and accurate information to patient/family  - Incorporate patient and family knowledge, values, beliefs, and cultural backgrounds into the planning and delivery of care  - Encourage patient/family to participate in care and decision-making at the level they choose  - Honor patient and family perspectives and choices  Outcome: Progressing     Problem: RISK FOR INFECTION - ADULT  Goal: Absence of fever/infection during anticipated neutropenic period  Description: INTERVENTIONS  - Monitor WBC  - Administer growth factors as ordered  - Implement neutropenic guidelines  Outcome: Progressing     Problem: SAFETY ADULT - FALL  Goal: Free from fall injury  Description: INTERVENTIONS:  - Assess pt frequently for physical needs  - Identify cognitive and physical deficits and behaviors that affect risk of falls.  - Thornton fall precautions as indicated by assessment.  - Educate pt/family on patient safety including physical  limitations  - Instruct pt to call for assistance with activity based on assessment  - Modify environment to reduce risk of injury  - Provide assistive devices as appropriate  - Consider OT/PT consult to assist with strengthening/mobility  - Encourage toileting schedule  Outcome: Progressing     Problem: DISCHARGE PLANNING  Goal: Discharge to home or other facility with appropriate resources  Description: INTERVENTIONS:  - Identify barriers to discharge w/pt and caregiver  - Include patient/family/discharge partner in discharge planning  - Arrange for needed discharge resources and transportation as appropriate  - Identify discharge learning needs (meds, wound care, etc)  - Arrange for interpreters to assist at discharge as needed  - Consider post-discharge preferences of patient/family/discharge partner  - Complete POLST form as appropriate  - Assess patient's ability to be responsible for managing their own health  - Refer to Case Management Department for coordinating discharge planning if the patient needs post-hospital services based on physician/LIP order or complex needs related to functional status, cognitive ability or social support system  Outcome: Progressing     Problem: Altered Communication/Language Barrier  Goal: Patient/Family is able to understand and participate in their care  Description: Interventions:  - Assess communication ability and preferred communication style  - Implement communication aides and strategies  - Use visual cues when possible  - Listen attentively, be patient, do not interrupt  - Minimize distractions  - Allow time for understanding and response  - Establish method for patient to ask for assistance (call light)  - Provide an  as needed  - Communicate barriers and strategies to overcome with those who interact with patient  Outcome: Progressing     Problem: Patient/Family Goals  Goal: Patient/Family Long Term Goal  Description: Patient's Long Term Goal:      Interventions:  -   - See additional Care Plan goals for specific interventions  Outcome: Progressing  Goal: Patient/Family Short Term Goal  Description: Patient's Short Term Goal:     Interventions:   -   - See additional Care Plan goals for specific interventions  Outcome: Progressing     Problem: PAIN - ADULT  Goal: Verbalizes/displays adequate comfort level or patient's stated pain goal  Description: INTERVENTIONS:  - Encourage pt to monitor pain and request assistance  - Assess pain using appropriate pain scale  - Administer analgesics based on type and severity of pain and evaluate response  - Implement non-pharmacological measures as appropriate and evaluate response  - Consider cultural and social influences on pain and pain management  - Manage/alleviate anxiety  - Utilize distraction and/or relaxation techniques  - Monitor for opioid side effects  - Notify MD/LIP if interventions unsuccessful or patient reports new pain  - Anticipate increased pain with activity and pre-medicate as appropriate  Outcome: Progressing     Problem: METABOLIC/FLUID AND ELECTROLYTES - ADULT  Goal: Electrolytes maintained within normal limits  Description: INTERVENTIONS:  - Monitor labs and rhythm and assess patient for signs and symptoms of electrolyte imbalances  - Administer electrolyte replacement as ordered  - Monitor response to electrolyte replacements, including rhythm and repeat lab results as appropriate  - Fluid restriction as ordered  - Instruct patient on fluid and nutrition restrictions as appropriate  Outcome: Progressing     Problem: SKIN/TISSUE INTEGRITY - ADULT  Goal: Skin integrity remains intact  Description: INTERVENTIONS  - Assess and document risk factors for pressure ulcer development  - Assess and document skin integrity  - Monitor for areas of redness and/or skin breakdown  - Initiate interventions, skin care algorithm/standards of care as needed  Outcome: Progressing  Goal: Incision(s), wounds(s) or  drain site(s) healing without S/S of infection  Description: INTERVENTIONS:  - Assess and document risk factors for pressure ulcer development  - Assess and document skin integrity  - Assess and document dressing/incision, wound bed, drain sites and surrounding tissue  - Implement wound care per orders  - Initiate isolation precautions as appropriate  - Initiate Pressure Ulcer prevention bundle as indicated  Outcome: Progressing     Problem: MUSCULOSKELETAL - ADULT  Goal: Return mobility to safest level of function  Description: INTERVENTIONS:  - Assess patient stability and activity tolerance for standing, transferring and ambulating w/ or w/o assistive devices  - Assist with transfers and ambulation using safe patient handling equipment as needed  - Ensure adequate protection for wounds/incisions during mobilization  - Obtain PT/OT consults as needed  - Advance activity as appropriate  - Communicate ordered activity level and limitations with patient/family  Outcome: Progressing  Goal: Maintain proper alignment of affected body part  Description: INTERVENTIONS:  - Support and protect limb and body alignment per provider's orders  - Instruct and reinforce with patient and family use of appropriate assistive device and precautions (e.g. spinal or hip dislocation precautions)  Outcome: Progressing     Problem: NEUROLOGICAL - ADULT  Goal: Achieves stable or improved neurological status  Description: INTERVENTIONS  - Assess for and report changes in neurological status  - Initiate measures to prevent increased intracranial pressure  - Maintain blood pressure and fluid volume within ordered parameters to optimize cerebral perfusion and minimize risk of hemorrhage  - Monitor temperature, glucose, and sodium. Initiate appropriate interventions as ordered  Outcome: Progressing  Goal: Absence of seizures  Description: INTERVENTIONS  - Monitor for seizure activity  - Administer anti-seizure medications as ordered  - Monitor  neurological status  Outcome: Progressing  Goal: Remains free of injury related to seizure activity  Description: INTERVENTIONS:  - Maintain airway, patient safety  and administer oxygen as ordered  - Monitor patient for seizure activity, document and report duration and description of seizure to MD/LIP  - If seizure occurs, turn patient to side and suction secretions as needed  - Reorient patient post seizure  - Seizure pads on all 4 side rails  - Instruct patient/family to notify RN of any seizure activity  - Instruct patient/family to call for assistance with activity based on assessment  Outcome: Progressing     Problem: Impaired Functional Mobility  Goal: Achieve highest/safest level of mobility/gait  Description: Interventions:  - Assess patient's functional ability and stability  - Promote increasing activity/tolerance for mobility and gait  - Educate and engage patient/family in tolerated activity level and precautions  - Recommend use of total lift for transfers  Outcome: Progressing     Problem: Impaired Activities of Daily Living  Goal: Achieve highest/safest level of independence in self care  Description: Interventions:  - Assess ability and encourage patient to participate in ADLs to maximize function  - Promote sitting position while performing ADLs such as feeding, grooming, and bathing  - Educate and encourage patient/family in tolerated functional activity level and precautions during self-care  - Encourage patient to incorporate impaired side during daily activities to promote function  Outcome: Progressing     Problem: Impaired Cognition  Goal: Patient will exhibit improved attention, thought processing and/or memory  Description: Interventions:  - Minimize distractions in the room when full attention is required  Outcome: Progressing     Problem: Impaired Communication  Goal: Patient will achieve maximal communication potential  Description: Interventions:  - Ask \"yes/no\" questions to facilitate  patient's ability to communicate wants and needs  Outcome: Progressing

## 2024-05-09 NOTE — DISCHARGE SUMMARY
[facility]  part of [facility] Internal Medicine Discharge Summary   Patient ID:  Mayelin Jimenez  [id number]  68 year old  6/15/1955    Admit date: 5/6/2024    Discharge date and time: 5/9/2024 11:55 AM      Attending Physician: No att. providers found     Primary Care Physician: Ciera Disla MD     Reason for admission progressive weakness, known metastatic glioblastoma with recurrence in April 2024 (see HPI on HP for further detail)    Discharged Condition: stable    Disposition:  Home with hospice    Risk of Readmission Lace+ Score: 73  59-90 High Risk  29-58 Medium Risk  0-28   Low Risk    Important follow up:  -PCP notified of hospice discharge on 5/8/2024.  Oncology was updated with hospice discharge on 5/9/2024 via alert MD.  -No further follow-up appointments made as patient will now be going home hospice  Discharge meds  I reviewed and reconciled current and discharge medications on the day of discharge with the changes reflected below.       Medication List        START taking these medications      bisacodyl 10 MG Supp  Commonly known as: Dulcolax  Place 1 suppository (10 mg total) rectally daily as needed (consitpation).            CONTINUE taking these medications      Acetaminophen Extra Strength 500 MG Tabs  Take 1 tablet by mouth every 6 (six) hours as needed.     dexamethasone 4 MG tablet  Commonly known as: Decadron  Take 1 tablet (4 mg total) by mouth every 12 (twelve) hours.     folic acid 1 MG Tabs  Commonly known as: Folvite  Take 1 tablet (1 mg total) by mouth daily.     HYDROcodone-acetaminophen 5-325 MG Tabs  Commonly known as: Norco  Take 1 tablet by mouth every 6 (six) hours as needed.     levETIRAcetam 500 MG Tabs  Commonly known as: Keppra            STOP taking these medications      alendronate 70 MG Tabs  Commonly known as: Fosamax     amoxicillin clavulanate 875-125 MG Tabs  Commonly known as: Augmentin     furosemide 20 MG Tabs  Commonly known  as: Lasix     MULTIVITAMIN ADULT OR     temozolomide 140 MG capsule  Commonly known as: Temodar     temozolomide 5 MG capsule  Commonly known as: Temodar     VITAMIN D OR               Where to Get Your Medications        These medications were sent to [id number] - [place], [place] - [address] AT Corner of [address] [place] [address] [place], [phone], [phone] [address] [facility] [address]      Phone: [phone]   dexamethasone 4 MG tablet       You can get these medications from any pharmacy    Bring a paper prescription for each of these medications  bisacodyl 10 MG Supp       HPI per chart  Mayelin Jimenez is a 68 year old female with PMH sig for glioblastoma diagnosed approximately 10 months ago status post craniotomy, radiation and chemotherapy with Avastin and Temodar with recent hospitalization at [facility]  April with seizures and progression of her known cancer.  Patient was to restart Avastin and Temodar to 100 mg instead of the previous 150 per last oncology note on 4/23/2024.  Avastin was scheduled to start today and Temodar is waiting at the pharmacy.  Patient with progressive weakness over the last few days, now unable to ambulate.  No overt signs of infection and patient continues to decline.  Family unable to care for her at home and given the overall poor prognosis and inability to tolerate further treatment at this time decision made to meet with hospice.  Per discussion with RN would like to be DNAR comfort.  Primary oncologist was messaged via alert MD on 5/7/2024     She is awake and alert and cooperative but cannot answer any detailed questions.  She denies current pain.  Does not appear to be in any distress.  UA negative for infection.  No respiratory symptoms.     Hospital Course  Mayelin Jimenez is a 68 year old female with PMH sig for glioblastoma diagnosed approximately 10 months ago status post craniotomy, radiation and chemotherapy with Avastin and  Temodar with recent hospitalization at [facility]  April with seizures and progression of her known cancer.  Patient was to restart Avastin and Temodar to 100 mg instead of the previous 150 per last oncology note on 4/23/2024.  Avastin was scheduled to start today and Temodar is waiting at the pharmacy.  Patient with progressive weakness over the last few days, now unable to ambulate.  No overt signs of infection and patient continues to decline.  Family unable to care for her at home and given the overall poor prognosis and inability to tolerate further treatment at this time decision made to meet with hospice.  Per discussion with RN would like to be DNAR comfort.  Primary oncologist was messaged via alert MD on 5/7/2024 and on 5/9/2024.  PCP messaged with update on 5/8/2024.  POLST form was signed with residential hospice and patient's .  DNAR comfort.  Unfortunately residential hospice does not service area in which they live in the patient is now with [facility].  Discharge Diagnoses:   Glioblastoma  Progressive weakness  Seizures secondary to tumor burden  - Recent progression mid April 2024 presenting with seizures  - Outpatient MRI shows progression  - Repeat CT here without bleed or other new finding other than known progression  - UA negative for infection and no other localizing symptoms.  Culture negative  - Given the profound weakness not a candidate for the Temodar or Avastin at this time per curbside discussion with  -now  unable to ambulate, Temodar and Avastin were never restarted  -Met with hospice, plan for home hospice  -Continue Keppra     Intermittent sinus tachycardia no hypoxia  - With activity, patient is need to transition to comfort measures, no further workup at this time     Hypernatremia  - Can continue fluids but further labs unhelpful as patient is transitioning to comfort measures, will discuss with family how they like to proceed    Consults: IP CONSULT TO SOCIAL  WORK    Radiology: CT BRAIN OR HEAD (17008)    Result Date: 5/7/2024  PROCEDURE: CT BRAIN OR HEAD (CPT=70450)  COMPARISON: [facility], MRI BRAIN (W+WO) (CPT=70553), 7/24/2023, 8:25 PM.  [facility], CT BRAIN OR HEAD (CPT=70450), 11/06/2023, 3:20 AM.  INDICATIONS: weakness  TECHNIQUE: CT images were obtained without contrast material.  Automated exposure control for dose reduction was used.  Dose information is transmitted to the ACR (American College of Radiology) NRDR (National Radiology Data Registry) which includes the Dose Index Registry.  FINDINGS:  CSF SPACES: Ex vacuo dilatation of the posterior horn of the left lateral ventricle.  Remaining ventricles are normal in size and configuration.  Basal cisterns are patent.  No abnormal extra-axial fluid collection. CEREBRUM: There is a lobulated 12 mm diameter partially calcified focus in the posterior left frontal lobe near the vertex, corresponding to site of 15 x 22 mm hypodense change on the 11/6/23 CT brain.  There is less advanced vasogenic edema surrounding this finding, and there are new 10 x 18 and 11 x 18 mm hypodense foci in the left corona radiata with surrounding vasogenic edema, as well as encephalomalacia with volume loss and calcific densities within the left occipital lobe.  There is loss of normal sulcation and gyral pattern in the left frontal and parietal lobes near the vertex.  No midline shift. CEREBELLUM: No edema, hemorrhage, mass, acute infarction, or significant atrophy.  BRAINSTEM: No edema, hemorrhage, mass, acute infarction, or significant atrophy.  CALVARIUM: Posterior left parietal craniotomy/emilee hole defect with overlying plate. SINUSES: Limited views demonstrate no significant mucosal thickening or fluid.  ORBITS: Limited views are unremarkable.   OTHER: There is calcific atherosclerosis in the cavernous segments of the internal carotid arteries and vertebral arteries.          CONCLUSION:  1.  There  has been prior left parietal craniotomy.  There is diffuse edema across the left frontal-parietal lobes most prominent in the vertex.  In the left vertex there is a 12 mm lobulated soft tissue density focus which is new since 11/6/23, and this finding may represent a treated lesion at the edema in this region has decreased and there is previously a hypodense focus in this region.  However, there is ongoing edema within the corona radiata and there are 2 hypodense ovoid foci in this region measuring 10 x 18 and 11 x 18 mm, respectively which are new since previous exam.  There is also chronic encephalomalacia with volume loss in the left occipital lobe, which also contains punctate calcific densities.  Calcific densities are new since prior exam and may represent areas of blood-brain barrier breakdown with petechial hemorrhage, granulomatous calcifications, or blood products.  No midline shift.  These findings can be better assessed with MRI brain with and without contrast.     Dictated by (CST): Glenn Cabrera MD on 5/07/2024 at 1:03 PM     Finalized by (CST): Glenn Cabrera MD on 5/07/2024 at 1:08 PM             Operative Procedures:      Day of discharge no complaints, updated  at bedside.  Patient is [facility] with [facility].    Exam  Vitals:    05/09/24 1100   BP: 130/85   Pulse: 69   Resp: 18   Temp: 98.3 °F (36.8 °C)     No acute distress, alert and oriented to self family but forgetful  Lungs Clear  Heart Regular  Abdomen Benign    Total Time Coordinating Care: > than 30 minutes  Note: This chart was prepared using voice recognition software and may contain unintended word substitution errors.     Patient had opportunity to ask questions and state understand and agree with therapeutic plan as outlined

## 2024-05-09 NOTE — PROGRESS NOTES
Patient alert and oriented X2, vitals stable. Still with poor appetite. Tolerating pills whole with water. Incontinent of urine. Cleared for discharge. Discharge instructions reviewed with  at bedside including [facility] top follow up with them upon discharge; new, continued and discontinued medications. PIV removed. All belongings taken by . Patient transported via [facility].

## 2024-07-15 NOTE — PROGRESS NOTES
Rafaela Oppenheim   5/3/2019 8:40 AM   Office Visit   MRN:  [id number]   Description: 66year old female Provider: Abdelrahman Mansfield MD Department: [facility]     Scanning Cover Sheet     Click to print Carlito Circe 8[facility] for [facility] Also ordered bone scan to rule out implant loosening. Patient wants everything done at this time so that she does not have to make multiple visits back and forth because she lives far away and is inconvenient to do so.   She wants to communicate regarding 0

## (undated) DEVICE — TOTAL KNEE CDS: Brand: MEDLINE INDUSTRIES, INC.

## (undated) DEVICE — TUBING MEGADYNE SPECULUM

## (undated) DEVICE — BANDAGE ELASTIC ACE 6\" X-LONG

## (undated) DEVICE — SOL NACL IRRIG 0.9% 1000ML BTL

## (undated) DEVICE — Device: Brand: PLUMEPEN

## (undated) DEVICE — SOL  .9 3000ML

## (undated) DEVICE — SOL  .9 1000ML BTL

## (undated) DEVICE — SUTURE ETHILON 3-0 PS-1

## (undated) DEVICE — 3.0MM PRECISION NEURO (MATCH HEAD)

## (undated) DEVICE — 5.5MM ROUND FAST CUTTING BUR

## (undated) DEVICE — Device

## (undated) DEVICE — Device: Brand: STABLECUT®

## (undated) DEVICE — SNAP KOVER: Brand: UNBRANDED

## (undated) DEVICE — UNIVERSAL STERIBUMP® STERILE (5/CASE): Brand: UNIVERSAL STERIBUMP®

## (undated) DEVICE — SUTURE VICRYL 2-0 CP-1

## (undated) DEVICE — SUT VICRYL 2-0 CT-2 J726D

## (undated) DEVICE — STERILE POLYISOPRENE POWDER-FREE SURGICAL GLOVES: Brand: PROTEXIS

## (undated) DEVICE — PADDING CAST COTTON  4

## (undated) DEVICE — DRESSING AQUACEL AG 3.5 X 6

## (undated) DEVICE — KENDALL SCD EXPRESS SLEEVES, KNEE LENGTH, MEDIUM: Brand: KENDALL SCD

## (undated) DEVICE — GOWN SURG AERO CHROME XXL

## (undated) DEVICE — PROXIMATE SKIN STAPLERS (35 WIDE) CONTAINS 35 STAINLESS STEEL STAPLES (FIXED HEAD): Brand: PROXIMATE

## (undated) DEVICE — MEDI-VAC NON-CONDUCTIVE SUCTION TUBING: Brand: CARDINAL HEALTH

## (undated) DEVICE — DECANTER BAG 9": Brand: MEDLINE INDUSTRIES, INC.

## (undated) DEVICE — DRAPE,U/SHT,SPLIT,FILM,60X84,STERILE: Brand: MEDLINE

## (undated) DEVICE — DRESSING AQUACEL AG 3.5 X 10

## (undated) DEVICE — RUBBER BAND STRL

## (undated) DEVICE — GAUZE TRAY STERILE 4X4 12PLY

## (undated) DEVICE — 3M™ IOBAN™ 2 ANTIMICROBIAL INCISE DRAPE 6650EZ: Brand: IOBAN™ 2

## (undated) DEVICE — ABSORBABLE HEMOSTAT (OXIDIZED REGENERATED CELLULOSE, U.S.P.): Brand: SURGICEL

## (undated) DEVICE — SUTURE PDS II 0 CT-1

## (undated) DEVICE — HOOD, PEEL-AWAY: Brand: FLYTE

## (undated) DEVICE — CONVERTORS STOCKINETTE: Brand: CONVERTORS

## (undated) DEVICE — WRAP COOLING KNEE W/ICE PILLOW

## (undated) DEVICE — 2T11 #2 PDO 36 X 36: Brand: 2T11 #2 PDO 36 X 36

## (undated) DEVICE — KIT FEM BONE CEMENT RESTRICTOR: Type: IMPLANTABLE DEVICE

## (undated) DEVICE — ATTUNE PINNING SYSTEM
Type: IMPLANTABLE DEVICE | Site: KNEE
Brand: ATTUNE

## (undated) DEVICE — SOLUTION SURG DURAPREP 26ML

## (undated) DEVICE — ZIMMER® STERILE DISPOSABLE TOURNIQUET CUFF WITH PLC, DUAL PORT, SINGLE BLADDER, 42 IN. (107 CM)

## (undated) DEVICE — ZIMMER® STERILE DISPOSABLE TOURNIQUET CUFF WITH PLC, DUAL PORT, SINGLE BLADDER, 34 IN. (86 CM)

## (undated) DEVICE — DRESSING AQUACEL AG 3.5X12

## (undated) DEVICE — SPECIMEN CONTAINER,POSITIVE SEAL INDICATOR, OR PACKAGED: Brand: PRECISION

## (undated) DEVICE — SUCTION CANISTER, 3000CC,SAFELINER: Brand: DEROYAL

## (undated) DEVICE — E-Z BUTTON SWITCH PENCIL

## (undated) DEVICE — 1010 S-DRAPE TOWEL DRAPE 10/BX: Brand: STERI-DRAPE™

## (undated) DEVICE — BANDAGE ESMARK 6IN W/O LINER

## (undated) DEVICE — PRECISION (12.0 X 0.51 X 34.5MM)

## (undated) DEVICE — SYRINGE,EAR/ULCER, 2 OZ, STERILE: Brand: MEDLINE

## (undated) DEVICE — SUTURE VICRYL 2-0 FSL

## (undated) DEVICE — GAMMEX® PI HYBRID SIZE 7.5, STERILE POWDER-FREE SURGICAL GLOVE, POLYISOPRENE AND NEOPRENE BLEND: Brand: GAMMEX

## (undated) DEVICE — 2.3MM SPIRAL ROUTER

## (undated) DEVICE — STANDARD HYPODERMIC NEEDLE,POLYPROPYLENE HUB: Brand: MONOJECT

## (undated) DEVICE — CRANIOTOMY: Brand: MEDLINE INDUSTRIES, INC.

## (undated) DEVICE — PEN: MARKING STD PT 100/CS: Brand: MEDICAL ACTION INDUSTRIES

## (undated) DEVICE — DISPOSABLE SLIM BIPOLAR FORCEPS, NON-STICK,: Brand: SPETZLER-MALIS

## (undated) DEVICE — SUT VICRYL 3-0 CP-2 J761D

## (undated) DEVICE — SYRINGE 20CC LL TIP

## (undated) DEVICE — TRAY FOLEY 16F IC URINMETER

## (undated) DEVICE — SUT NUROLON 4-0 TF C584D

## (undated) DEVICE — DISPOSABLE REFLECTIVE MARKER SPHERES (DRMS) ATTACHED TO REFERENCE FRAMES AND SURGICAL INSTRUMENTS FOR USE DURING SURGICAL NAVIGATION IN IMAGE GUIDED SURGERY.  ONE RETAIL CARTON IS MADE UP OF 3 SPHERES PER TRAY, 30 TRAYS, 90 SPHERES TOTAL.: Brand: DISPOSABLE REFLECTIVE MARKER SPHERE (DRMS)

## (undated) DEVICE — MAYFIELD® DISPOSABLE ADULT SKULL PIN (STAINLESS STEEL): Brand: MAYFIELD®

## (undated) NOTE — LETTER
Date: 8/12/2019    Patient Name: Arun Lopez          To Whom it may concern: This letter has been written at the patient's request. The above patient was seen at [facility] for surgery today.       Sincerely,    Evelyn Kumar RN/Dr. Isac Paez

## (undated) NOTE — LETTER
[facility] Testing Department  Phone: [phone]  OUTSIDE TESTING RESULT REQUEST      TO:   Tutu Dick Today's Date: 5/15/19    FAX #: [phone]     IMPORTANT: FOR YOUR IMMEDIATE ATTENTION  Please FAX all test results listed below

## (undated) NOTE — LETTER
[facility] Testing Department  Phone: [phone]  OUTSIDE TESTING RESULT REQUEST      TO:   Beth Rock Today's Date: 7/25/19    FAX #: [phone]     IMPORTANT: FOR YOUR IMMEDIATE ATTENTION  Please FAX all test results listed below

## (undated) NOTE — IP AVS SNAPSHOT
[address]            (For Outpatient Use Only) Initial Admit Date: 8/12/2019   Inpt/Obs Admit Date: Inpt: 8/12/19 / Obs: N/A   Discharge Date:    Bree Rubin:  [de-identified]   MRN: [de-identified]   CSN: [id number]   CEID: [id number] Subscriber ID:  Pt Rel to Subscriber:    Hospital Account Financial Class: [employer] Marlon Calvert    August 16, 2019

## (undated) NOTE — IP AVS SNAPSHOT
Patient Demographics     Address  [address] [place] [id number] Phone  [phone] [facility]  [phone] [facility] E-mail Address  [email or web address]      Emergency Contact(s)     Name Relation Home Work Kevin Spouse   147-17 ? Usually allowed after four to six weeks. Discuss specific work activities with your surgeon. Restrictions  ? For knee replacement surgery, follow instructions provided by physical therapy.   ? Do NOT put a pillow under your knee as it may be more dif physician. ? Review anticoagulant education information sheet provided. Discomfort  ? Surgical discomfort is normal for one to two months. ? Have realistic goals and keep a positive outlook. ?  You may need pain medication regularly (every 4-6 hours) ? An enema or suppository may be needed if above measures do not work. Prevention of infection and promotion of healing  ? Good hand washing is important.  Everyone should wash their hands or use hand  as soon as they walk in your house-whether ? Red streaks on skin near incision. ? Temperature >100.4F.  ? Increased pain at incision not relieved by pain medication. Signs of blood clot  ? Pain, excessive tenderness, redness, or swelling in leg or calf (other than incision site).   (CALL View knee discharge education video at [email or web address]-spine. Choose after surgery info. Tubigrip (compression sleeve) for KNEES    ?  All patients should wear the compression sleeves (TUBIGRIP) until first follow up visit to [address] office ( abo assistant, within next 10-21 days. Call Dr. Telly Silva office if:  You have fever over 100.4. Wound looks very red. You have calf pain. If you have chest pain or shortness of breath, call 911. You will be going home with home care service.   Alejandro Ghassan Harkins MD. Schedule an appointment as soon as possible for a visit in 1 week.     Specialty:  [facility] information:  [address] [facility]  Tammy [address]var[address]  [phone]                  Your medication list      TAKE these m traMADol HCl 50 MG Tabs           384-384-A - MAR ACTION REPORT  (last 24 hrs)    ** SITE UNKNOWN **     Order ID Medication Name Action Time Action Reason Comments    [id number] PEG 3350 (MIRALAX) powder packet 17 g 08/15/19 1040 Given      [id number] Frank ---------                               -----------         ------                     CBC W/ DIFFERENTIAL[[id number]]          Abnormal            Final result                 Please view results for these tests on the individual orders.     Specimen Inform Specimen:  Tissue from knee, left      Anaerobic Culture No Anaerobes to date    Anaerobic Culture [[id number]] Collected:  08/12/19 1339    Order Status:  Completed Lab Status:  Preliminary result Updated:  08/15/19 1101    Specimen:  Tissue from knee, le She was readmitted for reimplantation yesterday. No signs of residual infection at the time of surgery. OR cx so far neg.      History:  Past Medical History:   Diagnosis Date   • Muscle weakness    • Osteoarthritis    • PONV (postoperative nausea and vomit 0.4 mg, 0.4 mg, Intravenous, Q2H PRN **OR** HYDROmorphone HCl (DILAUDID) 1 MG/ML injection 0.8 mg, 0.8 mg, Intravenous, Q2H PRN  •  traMADol HCl (ULTRAM) tab 50 mg, 50 mg, Oral, Q6H  •  acetaminophen (TYLENOL) tab 650 mg, 650 mg, Oral, QID  •  bacitracin ( HEENT: no scleral icterus or conjunctival injection. Moist mucous membranes. Neck: No lymphadenopathy. Supple. Respiratory: Clear to auscultation bilaterally. No wheezes. No rhonchi. Cardiovascular: S1, S2.  Regular rate and rhythm. No murmurs.   Abd Aruna Jefferson MD on 8/12/2019 at 17:05     Approved by: Aruna Jefferson MD             ASSESSMENT/ PLAN:    1. 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 Pt also with h/o right TKA with manipulationsx2. PMH OA, gastric bypass. Hgb 6.6 8/13/19.   Pt did require transfusion previo wheelchair, bedside commode, etc.): None   -   Moving from lying on back to sitting on the side of the bed?: None   How much help from another person does the patient currently need. ..   -   Moving to and from a bed to a chair (including a wheelchair)?: No L Knee Extension (degrees): 5    Patient End of Session: Up in chair;Needs met;Call light within reach;RN aware of session/findings; All patient questions and concerns addressed;SCDs in place; Ice applied    ASSESSMENT   Pt seen this am in PT group for gait CM.2 - Annmarie Vaughn PT on 8/15/2019 12:03 PM               Physical Therapy Note signed by Annmarie Vaughn PT at 8/15/2019 12:02 PM  Version 1 of 2    Author:  Annmarie Vaughn PT Service:  Rehab Author Type:  Physical Therapist    Filed:  8/15/ WEIGHT BEARING STATUS  Weight Bearing Restriction: L lower extremity           L Lower Extremity: Weight Bearing as Tolerated    PAIN ASSESSMENT   Rating: Unable to rate  Location: L knee  Management Techniques:  Activity promotion    BALANCE  Static Sittin gait.  Pt reviewed stair training with use of one rail with Mod I and step to pattern.     Exercises AM Session   Ankle Pumps 20 reps   Quad Sets 20 reps   Glut Sets 20 reps   Hip Abd/Add 20 reps   Heel slides 20 reps   Saq 20 reps   SLR 20 reps   Sitting K Patient is able to ambulate 150 feet with assistive device at assistance level: modified  independent-Met    Goal #4     Patient will negotiate 4 stairs/one curb w/ assistive device and supervision-Met at Mod I    Goal #5     AROM 0 degrees extension to • MANIPULATN KNEE JT+ANESTHESIA Left 05/08/2018    Dr Saima Aparicio   • TOTAL KNEE REPLACEMENT Left 11/28/2017    Dr Saima Aparicio   • TUBAL LIGATION         SUBJECTIVE  \"I have been through this 7 times, with both knees, 2 manipulations and the revision\"     Daphney PM : pt was wheeled to rehab gym. Pt participated in seated and standing therex per TKA protocol c increased reps.  Pt participated in stair training x 4 stairs B HR cues for sequencing and car/tub t/f c cues for sequencing and CGA assist. .   Pt gait train assistance level: modified  independent    Goal #4     Patient will negotiate 4 stairs/one curb w/ assistive device and supervision    Goal #5     AROM 0 degrees extension to 95 degrees flexion      Goal #6           Goal Comments: Goals established on 8/1 • PONV (postoperative nausea and vomiting)     profound nausea and vomiting   • Visual impairment     glasses[CY. 2]       Past Surgical History[CY. 1]  Past Surgical History:   Procedure Laterality Date   •      • GASTRIC BYPASS,OBESE<100CM BLAINE -   Climbing 3-5 steps with a railing?: A Little[CY. 2]       AM-PAC Score:[CY.1]  Raw Score: 18   Approx Degree of Impairment: 46.58%   Standardized Score (AM-PAC Scale): 43.63   CMS Modifier (G-Code): CK[CY. 2]    FUNCTIONAL ABILITY STATUS  Gait Assessment L Knee Flexion (degrees): 73     L Knee Extension (degrees): 13    Patient End of Session: Up in chair;Needs met;Call light within reach;RN aware of session/findings; All patient questions and concerns addressed; Family present[CY. 2]    ASSESSMENT   Pt co Occupational Therapist    Filed:  8/15/2019 12:43 PM Date of Service:  8/15/2019  8:00 AM Status:  Signed    :  Alisson Parker OT (Occupational Therapist)       OCCUPATIONAL THERAPY TREATMENT NOTE - INPATIENT     Room Number: 384/384-A  Session: techniques;Relaxation;Repositioning[BW. 3]     ACTIVITY TOLERANCE                         O2 SATURATIONS                ACTIVITIES OF DAILY LIVING ASSESSMENT  AM-PAC ‘6-Clicks’ Inpatient Daily Activity Short Form  How much help from another person does the addressed;SCDs in place; Ice applied[BW. 3]    ASSESSMENT[BW.1]   Patient seen for OT services this am. In this session patient making good progress towards and has met all OT goals.  Patient performed all ADL tasks, functional transfers, dynamic reaching and Filed:  8/14/2019  3:32 PM Date of Service:  8/14/2019 12:00 PM Status:  Addendum    :  Bernardo Alex OT (Occupational Therapist)    Related Notes:  Original Note by Bernardo Alex OT (Occupational Therapist) filed at 8/14/2019 12:39 PM Reason for Therapy: ADL/IADL Dysfunction and Discharge Planning    History related to current admission: Patient is 60year old female admitted on 8/12/2019 from home with history of L TKA '17 with subsequent infection, antibiotic spacer placed May '19, cu Location: L knee  Management Techniques: Activity promotion; Body mechanics;Breathing techniques;Relaxation;Repositioning    COGNITION  Overall Cognitive Status:  WFL - within functional limits    Communication: WFL    Behavioral/Emotional/Social: WFL    BREA posture; sitting via min assist and cues for safe hand placement. Patient also educated on OT role, safety, fall prevention, pain management with good verbal understanding. Will continue to follow.   Patient End of Session: Up in chair;Needs met;Call light training; Compensatory technique education  Rehab Potential : Good  Frequency (Obs): 5x/week  Number of Visits to Meet Established Goals: 2    ADL GOALS   Patient will perform lower body dressing with supervision  Patient will perform toileting with supervi

## (undated) NOTE — LETTER
Date: 8/12/2019    Patient Name: Tamar Yu          To Whom it may concern: This letter has been written at the patient's request. The above patient was seen at the [facility] for surgery today.       Sincerely,    Shravan Hurd RN/Dr. Arsenio Mckeon

## (undated) NOTE — LETTER
Date: 5/24/2019    Patient Name: Denny Davidson          To Whom it may concern: The above patient was admitted to the hospital on 5/22/19. She remains hospitalized in our care. Please excuse her , Davin Young, from work during this time.        Abel Grimes